# Patient Record
Sex: FEMALE | Race: WHITE | HISPANIC OR LATINO
[De-identification: names, ages, dates, MRNs, and addresses within clinical notes are randomized per-mention and may not be internally consistent; named-entity substitution may affect disease eponyms.]

---

## 2017-02-14 ENCOUNTER — MEDICATION RENEWAL (OUTPATIENT)
Age: 47
End: 2017-02-14

## 2017-03-06 ENCOUNTER — RX RENEWAL (OUTPATIENT)
Age: 47
End: 2017-03-06

## 2017-03-30 ENCOUNTER — APPOINTMENT (OUTPATIENT)
Dept: CARDIOLOGY | Facility: CLINIC | Age: 47
End: 2017-03-30

## 2017-03-30 ENCOUNTER — NON-APPOINTMENT (OUTPATIENT)
Age: 47
End: 2017-03-30

## 2017-03-30 VITALS — DIASTOLIC BLOOD PRESSURE: 70 MMHG | SYSTOLIC BLOOD PRESSURE: 120 MMHG

## 2017-03-30 VITALS
SYSTOLIC BLOOD PRESSURE: 151 MMHG | BODY MASS INDEX: 27.32 KG/M2 | HEIGHT: 66 IN | DIASTOLIC BLOOD PRESSURE: 82 MMHG | WEIGHT: 170 LBS

## 2017-05-04 ENCOUNTER — RX RENEWAL (OUTPATIENT)
Age: 47
End: 2017-05-04

## 2017-06-23 ENCOUNTER — INPATIENT (INPATIENT)
Facility: HOSPITAL | Age: 47
LOS: 0 days | Discharge: HOME | DRG: 761 | End: 2017-06-24
Attending: OBSTETRICS & GYNECOLOGY | Admitting: SPECIALIST
Payer: SELF-PAY

## 2017-06-23 ENCOUNTER — RESULT REVIEW (OUTPATIENT)
Age: 47
End: 2017-06-23

## 2017-06-23 VITALS
TEMPERATURE: 99 F | RESPIRATION RATE: 18 BRPM | HEART RATE: 120 BPM | SYSTOLIC BLOOD PRESSURE: 120 MMHG | OXYGEN SATURATION: 100 % | DIASTOLIC BLOOD PRESSURE: 79 MMHG

## 2017-06-23 DIAGNOSIS — N93.9 ABNORMAL UTERINE AND VAGINAL BLEEDING, UNSPECIFIED: ICD-10-CM

## 2017-06-23 DIAGNOSIS — D64.9 ANEMIA, UNSPECIFIED: ICD-10-CM

## 2017-06-23 LAB
APTT BLD: 22.2 SEC — LOW (ref 27.5–37.4)
BASOPHILS # BLD AUTO: 0 K/UL — SIGNIFICANT CHANGE UP (ref 0–0.2)
BASOPHILS NFR BLD AUTO: 0.3 % — SIGNIFICANT CHANGE UP (ref 0–2)
BLD GP AB SCN SERPL QL: NEGATIVE — SIGNIFICANT CHANGE UP
CK MB BLD-MCNC: 2.1 % — SIGNIFICANT CHANGE UP (ref 0–3.5)
CK MB CFR SERPL CALC: 1.3 NG/ML — SIGNIFICANT CHANGE UP (ref 0–3.8)
CK SERPL-CCNC: 63 U/L — SIGNIFICANT CHANGE UP (ref 25–170)
EOSINOPHIL # BLD AUTO: 0.1 K/UL — SIGNIFICANT CHANGE UP (ref 0–0.5)
EOSINOPHIL NFR BLD AUTO: 0.6 % — SIGNIFICANT CHANGE UP (ref 0–6)
GAS PNL BLDV: SIGNIFICANT CHANGE UP
HCG SERPL-ACNC: <2 MIU/ML — SIGNIFICANT CHANGE UP
HCT VFR BLD CALC: 17.7 % — CRITICAL LOW (ref 34.5–45)
HGB BLD-MCNC: 6.4 G/DL — CRITICAL LOW (ref 11.5–15.5)
INR BLD: 0.97 RATIO — SIGNIFICANT CHANGE UP (ref 0.88–1.16)
LYMPHOCYTES # BLD AUTO: 15.5 % — SIGNIFICANT CHANGE UP (ref 13–44)
LYMPHOCYTES # BLD AUTO: 2.1 K/UL — SIGNIFICANT CHANGE UP (ref 1–3.3)
MCHC RBC-ENTMCNC: 34.5 PG — HIGH (ref 27–34)
MCHC RBC-ENTMCNC: 36 GM/DL — SIGNIFICANT CHANGE UP (ref 32–36)
MCV RBC AUTO: 96 FL — SIGNIFICANT CHANGE UP (ref 80–100)
MONOCYTES # BLD AUTO: 0.8 K/UL — SIGNIFICANT CHANGE UP (ref 0–0.9)
MONOCYTES NFR BLD AUTO: 5.5 % — SIGNIFICANT CHANGE UP (ref 2–14)
NEUTROPHILS # BLD AUTO: 10.8 K/UL — HIGH (ref 1.8–7.4)
NEUTROPHILS NFR BLD AUTO: 78.1 % — HIGH (ref 43–77)
PLATELET # BLD AUTO: 264 K/UL — SIGNIFICANT CHANGE UP (ref 150–400)
PROTHROM AB SERPL-ACNC: 10.5 SEC — SIGNIFICANT CHANGE UP (ref 9.8–12.7)
RBC # BLD: 1.85 M/UL — LOW (ref 3.8–5.2)
RBC # FLD: 11.6 % — SIGNIFICANT CHANGE UP (ref 10.3–14.5)
RH IG SCN BLD-IMP: POSITIVE — SIGNIFICANT CHANGE UP
TROPONIN T SERPL-MCNC: <0.01 NG/ML — SIGNIFICANT CHANGE UP (ref 0–0.06)
WBC # BLD: 13.8 K/UL — HIGH (ref 3.8–10.5)
WBC # FLD AUTO: 13.8 K/UL — HIGH (ref 3.8–10.5)

## 2017-06-23 PROCEDURE — 99291 CRITICAL CARE FIRST HOUR: CPT | Mod: 25

## 2017-06-23 PROCEDURE — 88305 TISSUE EXAM BY PATHOLOGIST: CPT | Mod: 26

## 2017-06-23 PROCEDURE — 99222 1ST HOSP IP/OBS MODERATE 55: CPT

## 2017-06-23 PROCEDURE — 76830 TRANSVAGINAL US NON-OB: CPT | Mod: 26

## 2017-06-23 PROCEDURE — 93010 ELECTROCARDIOGRAM REPORT: CPT

## 2017-06-23 PROCEDURE — 71010: CPT | Mod: 26

## 2017-06-23 PROCEDURE — 58100 BIOPSY OF UTERUS LINING: CPT

## 2017-06-23 RX ORDER — INSULIN GLARGINE 100 [IU]/ML
60 INJECTION, SOLUTION SUBCUTANEOUS AT BEDTIME
Qty: 0 | Refills: 0 | Status: DISCONTINUED | OUTPATIENT
Start: 2017-06-23 | End: 2017-06-23

## 2017-06-23 RX ORDER — AMLODIPINE BESYLATE 2.5 MG/1
0 TABLET ORAL
Qty: 0 | Refills: 0 | COMMUNITY

## 2017-06-23 RX ORDER — SODIUM CHLORIDE 9 MG/ML
500 INJECTION INTRAMUSCULAR; INTRAVENOUS; SUBCUTANEOUS ONCE
Qty: 0 | Refills: 0 | Status: COMPLETED | OUTPATIENT
Start: 2017-06-23 | End: 2017-06-23

## 2017-06-23 RX ORDER — GLUCAGON INJECTION, SOLUTION 0.5 MG/.1ML
1 INJECTION, SOLUTION SUBCUTANEOUS ONCE
Qty: 0 | Refills: 0 | Status: DISCONTINUED | OUTPATIENT
Start: 2017-06-23 | End: 2017-06-24

## 2017-06-23 RX ORDER — DEXTROSE 50 % IN WATER 50 %
25 SYRINGE (ML) INTRAVENOUS ONCE
Qty: 0 | Refills: 0 | Status: DISCONTINUED | OUTPATIENT
Start: 2017-06-23 | End: 2017-06-24

## 2017-06-23 RX ORDER — FUROSEMIDE 40 MG
40 TABLET ORAL DAILY
Qty: 0 | Refills: 0 | Status: DISCONTINUED | OUTPATIENT
Start: 2017-06-23 | End: 2017-06-23

## 2017-06-23 RX ORDER — LOSARTAN POTASSIUM 100 MG/1
0 TABLET, FILM COATED ORAL
Qty: 0 | Refills: 0 | COMMUNITY

## 2017-06-23 RX ORDER — INSULIN LISPRO 100/ML
VIAL (ML) SUBCUTANEOUS
Qty: 0 | Refills: 0 | Status: DISCONTINUED | OUTPATIENT
Start: 2017-06-23 | End: 2017-06-24

## 2017-06-23 RX ORDER — CLOPIDOGREL BISULFATE 75 MG/1
75 TABLET, FILM COATED ORAL DAILY
Qty: 0 | Refills: 0 | Status: DISCONTINUED | OUTPATIENT
Start: 2017-06-23 | End: 2017-06-24

## 2017-06-23 RX ORDER — SODIUM CHLORIDE 9 MG/ML
1000 INJECTION, SOLUTION INTRAVENOUS
Qty: 0 | Refills: 0 | Status: DISCONTINUED | OUTPATIENT
Start: 2017-06-23 | End: 2017-06-24

## 2017-06-23 RX ORDER — INSULIN LISPRO 100/ML
6 VIAL (ML) SUBCUTANEOUS ONCE
Qty: 0 | Refills: 0 | Status: COMPLETED | OUTPATIENT
Start: 2017-06-23 | End: 2017-06-23

## 2017-06-23 RX ORDER — LEVOTHYROXINE SODIUM 125 MCG
0 TABLET ORAL
Qty: 0 | Refills: 0 | COMMUNITY

## 2017-06-23 RX ORDER — DEXTROSE 50 % IN WATER 50 %
12.5 SYRINGE (ML) INTRAVENOUS ONCE
Qty: 0 | Refills: 0 | Status: DISCONTINUED | OUTPATIENT
Start: 2017-06-23 | End: 2017-06-24

## 2017-06-23 RX ORDER — CLOPIDOGREL BISULFATE 75 MG/1
0 TABLET, FILM COATED ORAL
Qty: 0 | Refills: 0 | COMMUNITY

## 2017-06-23 RX ORDER — LOSARTAN POTASSIUM 100 MG/1
50 TABLET, FILM COATED ORAL DAILY
Qty: 0 | Refills: 0 | Status: DISCONTINUED | OUTPATIENT
Start: 2017-06-23 | End: 2017-06-24

## 2017-06-23 RX ORDER — INSULIN LISPRO 100/ML
VIAL (ML) SUBCUTANEOUS AT BEDTIME
Qty: 0 | Refills: 0 | Status: DISCONTINUED | OUTPATIENT
Start: 2017-06-23 | End: 2017-06-24

## 2017-06-23 RX ORDER — INSULIN LISPRO 100/ML
10 VIAL (ML) SUBCUTANEOUS
Qty: 0 | Refills: 0 | Status: DISCONTINUED | OUTPATIENT
Start: 2017-06-23 | End: 2017-06-24

## 2017-06-23 RX ORDER — IBUPROFEN 200 MG
400 TABLET ORAL EVERY 6 HOURS
Qty: 0 | Refills: 0 | Status: DISCONTINUED | OUTPATIENT
Start: 2017-06-23 | End: 2017-06-24

## 2017-06-23 RX ORDER — DEXTROSE 50 % IN WATER 50 %
1 SYRINGE (ML) INTRAVENOUS ONCE
Qty: 0 | Refills: 0 | Status: DISCONTINUED | OUTPATIENT
Start: 2017-06-23 | End: 2017-06-24

## 2017-06-23 RX ORDER — METOPROLOL TARTRATE 50 MG
25 TABLET ORAL EVERY 12 HOURS
Qty: 0 | Refills: 0 | Status: DISCONTINUED | OUTPATIENT
Start: 2017-06-23 | End: 2017-06-24

## 2017-06-23 RX ORDER — INSULIN GLARGINE 100 [IU]/ML
32 INJECTION, SOLUTION SUBCUTANEOUS AT BEDTIME
Qty: 0 | Refills: 0 | Status: DISCONTINUED | OUTPATIENT
Start: 2017-06-23 | End: 2017-06-24

## 2017-06-23 RX ORDER — NORGESTIMATE AND ETHINYL ESTRADIOL 7DAYSX3 LO
0 KIT ORAL
Qty: 0 | Refills: 0 | COMMUNITY

## 2017-06-23 RX ORDER — ACETAMINOPHEN 500 MG
650 TABLET ORAL EVERY 6 HOURS
Qty: 0 | Refills: 0 | Status: DISCONTINUED | OUTPATIENT
Start: 2017-06-23 | End: 2017-06-24

## 2017-06-23 RX ORDER — METOPROLOL TARTRATE 50 MG
0 TABLET ORAL
Qty: 0 | Refills: 0 | COMMUNITY

## 2017-06-23 RX ORDER — LEVOTHYROXINE SODIUM 125 MCG
137 TABLET ORAL DAILY
Qty: 0 | Refills: 0 | Status: DISCONTINUED | OUTPATIENT
Start: 2017-06-23 | End: 2017-06-24

## 2017-06-23 RX ADMIN — Medication 25 MILLIGRAM(S): at 21:50

## 2017-06-23 RX ADMIN — SODIUM CHLORIDE 125 MILLILITER(S): 9 INJECTION, SOLUTION INTRAVENOUS at 18:27

## 2017-06-23 RX ADMIN — INSULIN GLARGINE 32 UNIT(S): 100 INJECTION, SOLUTION SUBCUTANEOUS at 22:41

## 2017-06-23 RX ADMIN — SODIUM CHLORIDE 500 MILLILITER(S): 9 INJECTION INTRAMUSCULAR; INTRAVENOUS; SUBCUTANEOUS at 12:16

## 2017-06-23 NOTE — ED ADULT NURSE NOTE - OBJECTIVE STATEMENT
47 year old female presented to ED with c/o of vaginal bleeding for the past 3 days. Blood has been copious amounts and bright red with clots. Pt has cardiac hx of stents and diabetes. Pt states she gets her period every 3 months and is on birth control. Pt denies CP, SOB, nausea/vomiting, numbness/tingling, fever, cough, chills. Pt a&ox3, heart rate tachy, rhythm regular, abdomen soft tender to touch, nondistended. +BS in all four quadrants. Pt denies burning/pain on urination. Skin intact. IV in right AC 20G and patent. Pt currently resting in bed with side rails up for safety.

## 2017-06-23 NOTE — ED PROVIDER NOTE - MEDICAL DECISION MAKING DETAILS
Attending MD Clark: 47F with CAD s/p PCI, CHF, DM presenting with heavy vaginal bleeding x 3 days, patient arrives tachycardic into 130s, pale, endorsing dizziness and GIRALDO, concern for symptomatic anemia in setting of heavy vaginal bleeding, will initiate transfusion now, pelvic exam and TVUS when more stable

## 2017-06-23 NOTE — CONSULT NOTE ADULT - ASSESSMENT
47y  LMP  with PMH of CAD s/p stent placement on Plavix, HTN, DM2, hypothryoid, who presents with heavy vaginal bleeding and symptomatic anemia.

## 2017-06-23 NOTE — H&P ADULT - PROBLEM SELECTOR PLAN 1
- pad counts  - 2u PRBC  - f/u TVUS  - Progesterone if patient's bleeding does not slow down  - will need biopsy as either inpatient or outpatient.  - Monitor VS  - repeat CBC 4 hrs post transfusion

## 2017-06-23 NOTE — ED ADULT NURSE NOTE - CHPI ED SYMPTOMS NEG
no decreased eating/drinking/no loss of consciousness/no vomiting/no chills/no nausea/no fever/no back pain/no headache/no dizziness

## 2017-06-23 NOTE — CONSULT NOTE ADULT - SUBJECTIVE AND OBJECTIVE BOX
HPI    47y  LMP  with PMH of CAD s/p stent placement on Plavix, HTN, DM2, hypothryoid, who presents with heavy vaginal bleeding and symptomatic anemia. Patient states this has never happened to her before. She is on Sprintec for hormonal control (prescribed by her endocrinologist) and has a period every 3 months. She states this time, her period came normally but her bleeding was excessive. She states she changes a pad every 30 minutes. She admits to having shortness of breath and palpitations with ambulation. She admits to feeling lightheaded.      OB/GYN HISTORY:  x 3, denies abnormal paps/fibroids  PAST MEDICAL & SURGICAL HISTORY:  CAD (Coronary Artery Disease)  Hypercholesteremia  History of Hypothyroidism  Essential Hypertension  GERD (Gastroesophageal Reflux Disease)  Diabetes Mellitus Type II, Controlled  S/P coronary artery stent placement  and     Allergies  No Known Allergies    MEDICATIONS  (STANDING):  Plavix  Synthroid  Metoprolol  Losartan  Insulin  Sprintec    REVIEW OF SYSTEMS:    CONSTITUTIONAL: Weakness, admits to feeling cold  EYES/ENT: No visual changes;  No vertigo or throat pain   NECK: No pain or stiffness  RESPIRATORY: SOB with exertion   CARDIOVASCULAR: palpitations  GASTROINTESTINAL: No abdominal or epigastric pain. No nausea, vomiting, or hematemesis; No diarrhea or constipation. No melena or hematochezia.  GENITOURINARY: vaginal bleeding  NEUROLOGICAL: No numbness or weakness  SKIN: No itching, burning, rashes, or lesions   All other review of systems is negative unless indicated above.      Vital Signs Last 24 Hrs  T(C): 36.7, Max: 37.1 (-23 @ 10:23)  T(F): 98, Max: 98.7 (-23 @ 10:23)  HR: 125 (120 - 125)  BP: 134/82 (120/79 - 134/82)  BP(mean): --  RR: 18 (18 - 18)  SpO2: 99% (99% - 100%)    PHYSICAL EXAM:      Constitutional: alert and oriented x 3    Breasts: no tenderness, no nodules    Respiratory: clear    Cardiovascular: regular rate and rhythm    Gastrointestinal: soft, non tender, + bowel sounds. No hepatosplenomegaly, no palpable masses    Genitourinary: NEFG  Cervix: closed/ long, no CMT  Uterus: normal size, non tender  Adnexa: non tender, no palpable masses    Rectal:     Extremities:    Neurological:    Skin:    Lymph Nodes:        LABS:                        6.4    13.8  )-----------( 264      ( 2017 11:12 )             17.7         134<L>  |  99  |  11  ----------------------------<  376<H>  4.4   |  23  |  0.71    Ca    8.9      2017 11:12    TPro  5.9<L>  /  Alb  3.4  /  TBili  0.2  /  DBili  x   /  AST  14  /  ALT  13  /  AlkPhos  74      PT/INR - ( 2017 11:12 )   PT: 10.5 sec;   INR: 0.97 ratio         PTT - ( 2017 11:12 )  PTT:22.2 sec      Blood Type: B Positive      RADIOLOGY & ADDITIONAL STUDIES: HPI    47y  LMP  with PMH of CAD s/p stent placement on Plavix, HTN, DM2, hypothryoid, who presents with heavy vaginal bleeding and symptomatic anemia. Patient states this has never happened to her before. She is on Sprintec for hormonal control (prescribed by her endocrinologist) and has a period every 3 months. She states this time, her period came normally but her bleeding was excessive. She states she changes a pad every 30 minutes. She admits to having shortness of breath and palpitations with ambulation. She admits to feeling lightheaded.      OB/GYN HISTORY:  x 3, denies abnormal paps/fibroids  PAST MEDICAL & SURGICAL HISTORY:  CAD (Coronary Artery Disease)  Hypercholesteremia  History of Hypothyroidism  Essential Hypertension  GERD (Gastroesophageal Reflux Disease)  Diabetes Mellitus Type II, Controlled  S/P coronary artery stent placement  and     Allergies  No Known Allergies    MEDICATIONS  (STANDING):  Plavix  Synthroid  Metoprolol  Losartan  Insulin  Sprintec    REVIEW OF SYSTEMS:    CONSTITUTIONAL: Weakness, admits to feeling cold  EYES/ENT: No visual changes;  No vertigo or throat pain   NECK: No pain or stiffness  RESPIRATORY: SOB with exertion   CARDIOVASCULAR: palpitations  GASTROINTESTINAL: No abdominal or epigastric pain. No nausea, vomiting, or hematemesis; No diarrhea or constipation. No melena or hematochezia.  GENITOURINARY: vaginal bleeding  NEUROLOGICAL: No numbness or weakness  SKIN: No itching, burning, rashes, or lesions   All other review of systems is negative unless indicated above.      Vital Signs Last 24 Hrs  T(C): 36.7, Max: 37.1 (-23 @ 10:23)  T(F): 98, Max: 98.7 (-23 @ 10:23)  HR: 125 (120 - 125)  BP: 134/82 (120/79 - 134/82)  BP(mean): --  RR: 18 (18 - 18)  SpO2: 99% (99% - 100%)    PHYSICAL EXAM:    Constitutional: alert and oriented x 3  Respiratory: clear  Cardiovascular: regular rate and rhythm  Gastrointestinal: soft, non tender, + bowel sounds. No hepatosplenomegaly, no palpable masses  Genitourinary: NEFG  Cervix: dilated 1cm, minimal blood oozing from cervical os, approximately 10-20 cc of blood in vaginal vault  Uterus: normal size, anteverted  Adnexa: non tender, no palpable masses      LABS:                        6.4    13.8  )-----------( 264      ( 2017 11:12 )             17.7         134<L>  |  99  |  11  ----------------------------<  376<H>  4.4   |  23  |  0.71    Ca    8.9      2017 11:12    TPro  5.9<L>  /  Alb  3.4  /  TBili  0.2  /  DBili  x   /  AST  14  /  ALT  13  /  AlkPhos  74      PT/INR - ( 2017 11:12 )   PT: 10.5 sec;   INR: 0.97 ratio         PTT - ( 2017 11:12 )  PTT:22.2 sec      Blood Type: B Positive

## 2017-06-23 NOTE — ED ADULT NURSE NOTE - PMH
CAD (Coronary Artery Disease)    Diabetes Mellitus Type II, Controlled    Essential Hypertension    History of Hypothyroidism    Hypercholesteremia

## 2017-06-23 NOTE — CONSULT NOTE ADULT - PROBLEM SELECTOR RECOMMENDATION 9
- Monitor bleeding  - pad counts  - 2u PRBC  - f/u TVUS  - Progesterone if patient's bleeding does not slow down  - will need biopsy as either inpatient or outpatient

## 2017-06-23 NOTE — H&P ADULT - NSHPREVIEWOFSYSTEMS_GEN_ALL_CORE
REVIEW OF SYSTEMS:    CONSTITUTIONAL: Weakness, admits to feeling cold  EYES/ENT: No visual changes;  No vertigo or throat pain   NECK: No pain or stiffness  RESPIRATORY: SOB with exertion   CARDIOVASCULAR: palpitations  GASTROINTESTINAL: No abdominal or epigastric pain. No nausea, vomiting, or hematemesis; No diarrhea or constipation. No melena or hematochezia.  GENITOURINARY: vaginal bleeding  NEUROLOGICAL: No numbness or weakness  SKIN: No itching, burning, rashes, or lesions   All other review of systems is negative unless indicated above.

## 2017-06-23 NOTE — ED PROVIDER NOTE - OBJECTIVE STATEMENT
47 year old female with PMHx of DM, HTN, HLD, hypothyroidism, and CAD s/p stent and heavy periods "always" presents with 2 days of heavy vaginal bleeding with clots now accompanied by dyspnea on exertion. No chest pain, nausea, or fever. She is already on OCPs from her endocrinologist and does not have an OBGYN.

## 2017-06-23 NOTE — H&P ADULT - HISTORY OF PRESENT ILLNESS
HPI    47y  LMP  with PMH of CAD s/p stent placement on Plavix, HTN, DM2, hypothryoid, who presents with heavy vaginal bleeding and symptomatic anemia. Patient states this has never happened to her before. She is on Sprintec for hormonal control (prescribed by her endocrinologist) and has a period every 3 months. She states this time, her period came normally but her bleeding was excessive. She states she changes a pad every 30 minutes. She admits to having shortness of breath and palpitations with ambulation. She admits to feeling lightheaded.      OB/GYN HISTORY:  x 3, denies abnormal paps/fibroids  PAST MEDICAL & SURGICAL HISTORY:  CAD (Coronary Artery Disease)  Hypercholesteremia  History of Hypothyroidism  Essential Hypertension  GERD (Gastroesophageal Reflux Disease)  Diabetes Mellitus Type II, Controlled  S/P coronary artery stent placement  and

## 2017-06-23 NOTE — ED ADULT NURSE REASSESSMENT NOTE - NS ED NURSE REASSESS COMMENT FT1
Blood bank contacted. Type and screen received at 1130. Blood will be ready in 45 minutes. Will continue to monitor.

## 2017-06-23 NOTE — H&P ADULT - NSHPLABSRESULTS_GEN_ALL_CORE
LABS:                        6.4    13.8  )-----------( 264      ( 23 Jun 2017 11:12 )             17.7     06-23    134<L>  |  99  |  11  ----------------------------<  376<H>  4.4   |  23  |  0.71    Ca    8.9      23 Jun 2017 11:12    TPro  5.9<L>  /  Alb  3.4  /  TBili  0.2  /  DBili  x   /  AST  14  /  ALT  13  /  AlkPhos  74  06-23    PT/INR - ( 23 Jun 2017 11:12 )   PT: 10.5 sec;   INR: 0.97 ratio         PTT - ( 23 Jun 2017 11:12 )  PTT:22.2 sec      Blood Type: B Positive

## 2017-06-23 NOTE — PROCEDURE NOTE - SUPERVISORY STATEMENT
GYN Attending Note    I was present and participated in every aspect of endometrial bx. Procedure explained to patient and informed consent signed and witnessed.     ROSIO Villaseñor

## 2017-06-23 NOTE — PATIENT PROFILE ADULT. - VISION (WITH CORRECTIVE LENSES IF THE PATIENT USUALLY WEARS THEM):
Partially impaired: cannot see medication labels or newsprint, but can see obstacles in path, and the surrounding layout; can count fingers at arm's length/B/L Eyes Cataract surgery

## 2017-06-23 NOTE — ED PROVIDER NOTE - CARE PLAN
Principal Discharge DX:	Vaginal bleeding Principal Discharge DX:	Vaginal bleeding  Secondary Diagnosis:	Symptomatic anemia

## 2017-06-23 NOTE — PROCEDURE NOTE - GENERAL PROCEDURE DETAILS
cervix grasped with allis clamp, two passes of pipelle into uterus, entered easily, adequate tissue obtained

## 2017-06-23 NOTE — ED PROVIDER NOTE - PHYSICAL EXAMINATION
Attending MD Clark: A & O x 3, NAD, pale, HEENT WNL and no facial asymmetry; lungs CTAB, heart tachy but reg rhythm without murmur; abdomen soft NTND; extremities with no edema; neuro exam non focal with no motor or sensory deficits. Attending MD Clark: A & O x 3, NAD, pale, HEENT WNL and no facial asymmetry; lungs CTAB, heart tachy but reg rhythm without murmur; abdomen soft NTND; extremities with no edema; neuro exam non focal with no motor or sensory deficits. pelvic exam (chaperone Dr. Vu): clot at cervical os, small amount of blood in posterior fornix

## 2017-06-23 NOTE — H&P ADULT - NSHPPHYSICALEXAM_GEN_ALL_CORE
Vital Signs Last 24 Hrs  T(C): 36.7, Max: 37.1 (06-23 @ 10:23)  T(F): 98, Max: 98.7 (06-23 @ 10:23)  HR: 125 (120 - 125)  BP: 134/82 (120/79 - 134/82)  BP(mean): --  RR: 18 (18 - 18)  SpO2: 99% (99% - 100%)    PHYSICAL EXAM:    Constitutional: alert and oriented x 3  Respiratory: clear  Cardiovascular: regular rate and rhythm  Gastrointestinal: soft, non tender, + bowel sounds. No hepatosplenomegaly, no palpable masses  Genitourinary: NEFG  Cervix: dilated 1cm, minimal blood oozing from cervical os, approximately 10-20 cc of blood in vaginal vault  Uterus: normal size, anteverted  Adnexa: non tender, no palpable masses

## 2017-06-23 NOTE — ED ADULT NURSE REASSESSMENT NOTE - NS ED NURSE REASSESS COMMENT FT1
Second unit of packed red blood cells administered at 1800 as per MD order with 2 RN's present. VSS.

## 2017-06-23 NOTE — H&P ADULT - ATTENDING COMMENTS
Pt seen in ER w GYN team.  46yo admitted w significant symptomatic anemia.  Pt has not seen GYN in approx 5 yrs.  She has been treated w continuous OCPS by her endocrinologist for ?early menopause and wgt gain.  Long discussion w pt.  Explained that she should not continue OCPS.  Will transfuse and monitor Hcts.  Menorrhagia can be managed w progesterone pending results of sono.  Plan d/w pt.  All questions answered.

## 2017-06-23 NOTE — ED ADULT NURSE REASSESSMENT NOTE - NS ED NURSE REASSESS COMMENT FT1
Consent for blood in chart. Blood started at 1250 as per MD order with 2 RN's present. VS taken before infusion and 15 minutes after. Blood was infusing over 3 hours, order changed to infuse over an hour. VSS.

## 2017-06-23 NOTE — ED PROVIDER NOTE - ATTENDING CONTRIBUTION TO CARE
Attending MD Clark:  I personally have seen and examined this patient.  Resident note reviewed and agree on plan of care and except where noted.  See HPI, PE, and MDM for details.

## 2017-06-24 VITALS
OXYGEN SATURATION: 100 % | RESPIRATION RATE: 16 BRPM | TEMPERATURE: 98 F | DIASTOLIC BLOOD PRESSURE: 70 MMHG | SYSTOLIC BLOOD PRESSURE: 112 MMHG | HEART RATE: 85 BPM

## 2017-06-24 LAB
BASOPHILS # BLD AUTO: 0 K/UL — SIGNIFICANT CHANGE UP (ref 0–0.2)
BASOPHILS NFR BLD AUTO: 0.2 % — SIGNIFICANT CHANGE UP (ref 0–2)
EOSINOPHIL # BLD AUTO: 0.1 K/UL — SIGNIFICANT CHANGE UP (ref 0–0.5)
EOSINOPHIL NFR BLD AUTO: 0.8 % — SIGNIFICANT CHANGE UP (ref 0–6)
HBA1C BLD-MCNC: 7.2 % — HIGH (ref 4–5.6)
HCT VFR BLD CALC: 25.2 % — LOW (ref 34.5–45)
HGB BLD-MCNC: 8.6 G/DL — LOW (ref 11.5–15.5)
LYMPHOCYTES # BLD AUTO: 19 % — SIGNIFICANT CHANGE UP (ref 13–44)
LYMPHOCYTES # BLD AUTO: 2.4 K/UL — SIGNIFICANT CHANGE UP (ref 1–3.3)
MCHC RBC-ENTMCNC: 31.3 PG — SIGNIFICANT CHANGE UP (ref 27–34)
MCHC RBC-ENTMCNC: 34.1 GM/DL — SIGNIFICANT CHANGE UP (ref 32–36)
MCV RBC AUTO: 91.8 FL — SIGNIFICANT CHANGE UP (ref 80–100)
MONOCYTES # BLD AUTO: 0.8 K/UL — SIGNIFICANT CHANGE UP (ref 0–0.9)
MONOCYTES NFR BLD AUTO: 6.5 % — SIGNIFICANT CHANGE UP (ref 2–14)
NEUTROPHILS # BLD AUTO: 9.4 K/UL — HIGH (ref 1.8–7.4)
NEUTROPHILS NFR BLD AUTO: 73.5 % — SIGNIFICANT CHANGE UP (ref 43–77)
PLATELET # BLD AUTO: 254 K/UL — SIGNIFICANT CHANGE UP (ref 150–400)
RAPID RVP RESULT: DETECTED
RBC # BLD: 2.75 M/UL — LOW (ref 3.8–5.2)
RBC # FLD: 12.9 % — SIGNIFICANT CHANGE UP (ref 10.3–14.5)
RV+EV RNA SPEC QL NAA+PROBE: DETECTED
WBC # BLD: 12.8 K/UL — HIGH (ref 3.8–10.5)
WBC # FLD AUTO: 12.8 K/UL — HIGH (ref 3.8–10.5)

## 2017-06-24 PROCEDURE — 84702 CHORIONIC GONADOTROPIN TEST: CPT

## 2017-06-24 PROCEDURE — 84295 ASSAY OF SERUM SODIUM: CPT

## 2017-06-24 PROCEDURE — 76830 TRANSVAGINAL US NON-OB: CPT

## 2017-06-24 PROCEDURE — 71045 X-RAY EXAM CHEST 1 VIEW: CPT

## 2017-06-24 PROCEDURE — 88305 TISSUE EXAM BY PATHOLOGIST: CPT

## 2017-06-24 PROCEDURE — 82550 ASSAY OF CK (CPK): CPT

## 2017-06-24 PROCEDURE — 83036 HEMOGLOBIN GLYCOSYLATED A1C: CPT

## 2017-06-24 PROCEDURE — 86923 COMPATIBILITY TEST ELECTRIC: CPT

## 2017-06-24 PROCEDURE — 87798 DETECT AGENT NOS DNA AMP: CPT

## 2017-06-24 PROCEDURE — 87486 CHLMYD PNEUM DNA AMP PROBE: CPT

## 2017-06-24 PROCEDURE — 36430 TRANSFUSION BLD/BLD COMPNT: CPT

## 2017-06-24 PROCEDURE — 85014 HEMATOCRIT: CPT

## 2017-06-24 PROCEDURE — 82803 BLOOD GASES ANY COMBINATION: CPT

## 2017-06-24 PROCEDURE — 87581 M.PNEUMON DNA AMP PROBE: CPT

## 2017-06-24 PROCEDURE — 84484 ASSAY OF TROPONIN QUANT: CPT

## 2017-06-24 PROCEDURE — 85027 COMPLETE CBC AUTOMATED: CPT

## 2017-06-24 PROCEDURE — 86900 BLOOD TYPING SEROLOGIC ABO: CPT

## 2017-06-24 PROCEDURE — 82435 ASSAY OF BLOOD CHLORIDE: CPT

## 2017-06-24 PROCEDURE — 85610 PROTHROMBIN TIME: CPT

## 2017-06-24 PROCEDURE — P9016: CPT

## 2017-06-24 PROCEDURE — 83605 ASSAY OF LACTIC ACID: CPT

## 2017-06-24 PROCEDURE — 87633 RESP VIRUS 12-25 TARGETS: CPT

## 2017-06-24 PROCEDURE — 86901 BLOOD TYPING SEROLOGIC RH(D): CPT

## 2017-06-24 PROCEDURE — 80053 COMPREHEN METABOLIC PANEL: CPT

## 2017-06-24 PROCEDURE — 99291 CRITICAL CARE FIRST HOUR: CPT | Mod: 25

## 2017-06-24 PROCEDURE — 82947 ASSAY GLUCOSE BLOOD QUANT: CPT

## 2017-06-24 PROCEDURE — 93005 ELECTROCARDIOGRAM TRACING: CPT

## 2017-06-24 PROCEDURE — 86850 RBC ANTIBODY SCREEN: CPT

## 2017-06-24 PROCEDURE — 85730 THROMBOPLASTIN TIME PARTIAL: CPT

## 2017-06-24 PROCEDURE — 84132 ASSAY OF SERUM POTASSIUM: CPT

## 2017-06-24 PROCEDURE — 82330 ASSAY OF CALCIUM: CPT

## 2017-06-24 PROCEDURE — 82553 CREATINE MB FRACTION: CPT

## 2017-06-24 RX ORDER — LEVOTHYROXINE SODIUM 125 MCG
137 TABLET ORAL
Qty: 0 | Refills: 0 | COMMUNITY

## 2017-06-24 RX ORDER — NORGESTIMATE AND ETHINYL ESTRADIOL 7DAYSX3 LO
1 KIT ORAL
Qty: 0 | Refills: 0 | COMMUNITY

## 2017-06-24 RX ORDER — NORETHINDRONE 0.35 MG/1
1 TABLET ORAL
Qty: 28 | Refills: 0 | OUTPATIENT
Start: 2017-06-24 | End: 2017-07-22

## 2017-06-24 RX ADMIN — Medication 25 MILLIGRAM(S): at 06:09

## 2017-06-24 RX ADMIN — Medication 10 UNIT(S): at 08:47

## 2017-06-24 RX ADMIN — LOSARTAN POTASSIUM 50 MILLIGRAM(S): 100 TABLET, FILM COATED ORAL at 06:09

## 2017-06-24 RX ADMIN — Medication 137 MICROGRAM(S): at 06:09

## 2017-06-24 RX ADMIN — Medication 2: at 08:47

## 2017-06-24 NOTE — DISCHARGE NOTE ADULT - CARE PLAN
Principal Discharge DX:	Symptomatic anemia  Goal:	GYN management of abnormal uterine bleeding  Instructions for follow-up, activity and diet:	Please call [phone] for a GYN appointment 1-2 weeks from discharge. Take iron supplements with vitamin c and colace three times a day for anemia. Seek medical attention if you have uncontrolled vaginal bleeding, chest pain, difficulty breathing, or weakness Principal Discharge DX:	Symptomatic anemia  Goal:	GYN management of abnormal uterine bleeding  Instructions for follow-up, activity and diet:	Please call [phone] for a GYN appointment 1-2 weeks from discharge. Take micronor daily. Take iron supplements with vitamin c and colace three times a day for anemia. Seek medical attention if you have uncontrolled vaginal bleeding, chest pain, difficulty breathing, or weakness Principal Discharge DX:	Symptomatic anemia  Goal:	GYN management of abnormal uterine bleeding  Instructions for follow-up, activity and diet:	Please call [phone] for a GYN appointment 1-2 weeks from discharge. Take micronor daily. Take iron supplements with vitamin c and colace three times a day for anemia. Seek medical attention if you have uncontrolled vaginal bleeding, chest pain, difficulty breathing, or weakness

## 2017-06-24 NOTE — DISCHARGE NOTE ADULT - CARE PROVIDER_API CALL
GYN CLINIC,   [address] [place] [address]  [address] [place] [address]  Phone: [phone]  Fax: (   )    -

## 2017-06-24 NOTE — DISCHARGE NOTE ADULT - PATIENT PORTAL LINK FT
“You can access the Follow[employer] Patient Portal, offered by [facility], by registering with the following website: [email or web address]”

## 2017-06-24 NOTE — DISCHARGE NOTE ADULT - HOSPITAL COURSE
Pt admitted with symptomatic anemia 2/2 abnormal uterine bleeding. An endometrial biopsy was performed in the emergency room. She was tranfused 2U PRBC overnight. Hct 17.7->2UPRBC->25.2. Endocrine was consulted for management of T1DM, they recommended Lantus 32qhs and Humalog 10 premeal. Fingersticks were well controlled in the hospital. She did not have any further bleeding while in the hospital. She was discharged home in stable condition to start ortho micronor and follow up at [address] [place] [address] in 1-2 weeks.

## 2017-06-24 NOTE — DISCHARGE NOTE ADULT - PROVIDER TOKENS
FREE:[LAST:[GYN CLINIC],PHONE:[phone]:[phone]   [phone]    -],ADDRESS:[[address] [place] [address]]]

## 2017-06-24 NOTE — PROVIDER CONTACT NOTE (OTHER) - ASSESSMENT
Pt is resting in bed Pt denies pain . Pt stated she has only one vein at the right AC which is where she is getting the IV Fluid. She refused to let us try somewhere else, she said she will sign AMA . Dr Zimmerman notified

## 2017-06-24 NOTE — DISCHARGE NOTE ADULT - MEDICATION SUMMARY - MEDICATIONS TO TAKE
I will START or STAY ON the medications listed below when I get home from the hospital:    aspirin 81 mg oral tablet  -- 1 tab(s) by mouth once a day  -- Indication: For home med    losartan  -- 50 milligram(s) by mouth once a day  -- Indication: For home med    Toujeo SoloStar 300 units/mL subcutaneous solution  -- 60 unit(s) subcutaneous once a day (at bedtime)  -- Indication: For home med    NovoLOG 100 units/mL subcutaneous solution  -- 10 unit(s) subcutaneous 3 times a day (with meals), As Needed  -- Indication: For home med    atorvastatin 40 mg oral tablet  -- 1 tab(s) by mouth once a day  -- Indication: For home med    Plavix  -- 75 milligram(s) by mouth once a day  -- Indication: For home med    Lopressor  -- 25 milligram(s) by mouth every 12 hours  -- Indication: For home med    Norvasc  -- 5 milligram(s) by mouth once a day  -- Indication: For home med    furosemide 40 mg oral tablet  -- 1 tab(s) by mouth once a day  -- Indication: For home med I will START or STAY ON the medications listed below when I get home from the hospital:    aspirin 81 mg oral tablet  -- 1 tab(s) by mouth once a day  -- Indication: For home med    losartan  -- 50 milligram(s) by mouth once a day  -- Indication: For home med    Toujeo SoloStar 300 units/mL subcutaneous solution  -- 60 unit(s) subcutaneous once a day (at bedtime)  -- Indication: For home med    NovoLOG 100 units/mL subcutaneous solution  -- 10 unit(s) subcutaneous 3 times a day (with meals), As Needed  -- Indication: For home med    atorvastatin 40 mg oral tablet  -- 1 tab(s) by mouth once a day  -- Indication: For home med    Plavix  -- 75 milligram(s) by mouth once a day  -- Indication: For home med    Lopressor  -- 25 milligram(s) by mouth every 12 hours  -- Indication: For home med    Norvasc  -- 5 milligram(s) by mouth once a day  -- Indication: For home med    furosemide 40 mg oral tablet  -- 1 tab(s) by mouth once a day  -- Indication: For home med    Ortho Micronor 0.35 mg oral tablet  -- 1 tab(s) by mouth once a day  -- Do not take this drug if you are pregnant.  It is very important that you take or use this exactly as directed.  Do not skip doses or discontinue unless directed by your doctor.    -- Indication: For Abnormal uterine and vaginal bleeding

## 2017-06-24 NOTE — DISCHARGE NOTE ADULT - VISION (WITH CORRECTIVE LENSES IF THE PATIENT USUALLY WEARS THEM):
B/L Eyes Cataract surgery/Partially impaired: cannot see medication labels or newsprint, but can see obstacles in path, and the surrounding layout; can count fingers at arm's length

## 2017-06-24 NOTE — PROGRESS NOTE ADULT - SUBJECTIVE AND OBJECTIVE BOX
Gyn Progress Note HD # 2    Subjective:     Pt seen and examined at bedside. She was transfused 2U PRBC overnight, which she tolerated well. She was refusing blood draw at the 4hr post transfusion CBC, but accepted a blood draw at 645AM. Denies any further vaginal bleeding. Denies any pain. Patient ambulating. Passing flatus. Tolerating regular diet. Pt denies fever, chills, chest pain, SOB, nausea, vomiting, lightheadedness, dizziness.      Objective:  T(F): 98.5, Max: 98.9 (06-23 @ 21:30)  HR: 77 (77 - 125)  BP: 135/81 (120/79 - 143/84)  RR: 17 (17 - 18)  SpO2: 98% (98% - 100%)  I&O's Summary    I & Os for current day (as of 24 Jun 2017 07:12)  =============================================  IN: 120 ml / OUT: 1 ml / NET: 119 ml    CAPILLARY BLOOD GLUCOSE  138 (23 Jun 2017 21:00)      MEDICATIONS  (STANDING):  lactated ringers. 1000milliLiter(s) IV Continuous <Continuous>  multivitamin 1Tablet(s) Oral daily  losartan 50milliGRAM(s) Oral daily  metoprolol 25milliGRAM(s) Oral every 12 hours  levothyroxine 137MICROGram(s) Oral daily  insulin lispro (HumaLOG) corrective regimen sliding scale  SubCutaneous three times a day before meals  dextrose 5%. 1000milliLiter(s) IV Continuous <Continuous>  dextrose 50% Injectable 12.5Gram(s) IV Push once  dextrose 50% Injectable 25Gram(s) IV Push once  dextrose 50% Injectable 25Gram(s) IV Push once  clopidogrel Tablet 75milliGRAM(s) Oral daily  insulin glargine Injectable (LANTUS) 32Unit(s) SubCutaneous at bedtime  insulin lispro Injectable (HumaLOG) 10Unit(s) SubCutaneous three times a day before meals  insulin lispro (HumaLOG) corrective regimen sliding scale  SubCutaneous at bedtime    MEDICATIONS  (PRN):  acetaminophen   Tablet. 650milliGRAM(s) Oral every 6 hours PRN Mild Pain (1 - 3)  ibuprofen  Tablet 400milliGRAM(s) Oral every 6 hours PRN moderate pain  dextrose Gel 1Dose(s) Oral once PRN Blood Glucose LESS THAN 70 milliGRAM(s)/deciliter  glucagon  Injectable 1milliGRAM(s) IntraMuscular once PRN Glucose LESS THAN 70 milligrams/deciliter      Physical Exam:  Constitutional: NAD, A+O x3  CV: RRR  Lungs: clear to auscultation bilaterally  Abdomen: soft, + bowel sounds, appropriately tender, nondistended, no rebound or guarding  : no bleeding on peripad, which she has been wearing overnight  Extremities: no lower extremity edema or calf tenderness bilaterally, venodynes in place    Labs:                        6.4    13.8  )-----------( 264      ( 23 Jun 2017 11:12 )             17.7   baso 0.3    eos 0.6    imm gran x      lymph 15.5   mono 5.5    poly 78.1     06-23    134<L>  |  99     |  11     ----------------------------<  376<H>  4.4     |  23     |  0.71     Ca    8.9        23 Jun 2017 11:12    TPro  5.9<L>  /  Alb  3.4    /  TBili  0.2    /  DBili  x      /  AST  14     /  ALT  13     /  AlkPhos  74     06-23        PT/INR - ( 23 Jun 2017 11:12 )   PT: 10.5 sec;   INR: 0.97 ratio         PTT - ( 23 Jun 2017 11:12 )  PTT:22.2 sec    TVUS 6/23:   Uterus: 9.6 x 6.2 x 7.1 cm. Diffusely heterogeneous myometrium. A more focal heterogeneous ill-defined lesion in the anterior   myometrium measures approximately 2.3 x 3.1 cm, demonstrates some internal vascularity and partially compresses the endometrium..  Endometrium: 6 mm. Small amount of fluid in the endometrial canal.  Right ovary: Measures 1.6 x 1.1 x 1.8 cm.   Left ovary: Measures 1.7 x 1 x 1 cm.   Free fluid: None.  IMPRESSION:  Diffusely heterogeneous appearance of the myometrium with a more focal   ill-defined heterogeneous lesion in the anterior myometrium. These   findings may be secondary to adenomyosis vs. other etiologies.      Neuro: PCA for pain control. //Continue oral meds for pain control  CV: Hemodynamically stable  Pulm: Saturating well on RA. Increase incentive spirometry, out of bed, and ambulation as tolerated.  GI:   :   Heme: Continue HSQ/Lovenox/Venodynes for DVT ppx. Increase OOB and ambulation. Gyn Progress Note HD # 2    Subjective:     Pt seen and examined at bedside. She was transfused 2U PRBC overnight, which she tolerated well. She was refusing blood draw at the 4hr post transfusion CBC, but accepted a blood draw at 645AM. Denies any further vaginal bleeding. Denies any pain. Patient ambulating. Passing flatus. Tolerating regular diet. Pt denies fever, chills, chest pain, SOB, nausea, vomiting, lightheadedness, dizziness.      Objective:  T(F): 98.5, Max: 98.9 (06-23 @ 21:30)  HR: 77 (77 - 125)  BP: 135/81 (120/79 - 143/84)  RR: 17 (17 - 18)  SpO2: 98% (98% - 100%)  I&O's Summary    I & Os for current day (as of 24 Jun 2017 07:12)  =============================================  IN: 120 ml / OUT: 1 ml / NET: 119 ml    CAPILLARY BLOOD GLUCOSE  138 (23 Jun 2017 21:00)      MEDICATIONS  (STANDING):  lactated ringers. 1000milliLiter(s) IV Continuous <Continuous>  multivitamin 1Tablet(s) Oral daily  losartan 50milliGRAM(s) Oral daily  metoprolol 25milliGRAM(s) Oral every 12 hours  levothyroxine 137MICROGram(s) Oral daily  insulin lispro (HumaLOG) corrective regimen sliding scale  SubCutaneous three times a day before meals  dextrose 5%. 1000milliLiter(s) IV Continuous <Continuous>  dextrose 50% Injectable 12.5Gram(s) IV Push once  dextrose 50% Injectable 25Gram(s) IV Push once  dextrose 50% Injectable 25Gram(s) IV Push once  clopidogrel Tablet 75milliGRAM(s) Oral daily  insulin glargine Injectable (LANTUS) 32Unit(s) SubCutaneous at bedtime  insulin lispro Injectable (HumaLOG) 10Unit(s) SubCutaneous three times a day before meals  insulin lispro (HumaLOG) corrective regimen sliding scale  SubCutaneous at bedtime    MEDICATIONS  (PRN):  acetaminophen   Tablet. 650milliGRAM(s) Oral every 6 hours PRN Mild Pain (1 - 3)  ibuprofen  Tablet 400milliGRAM(s) Oral every 6 hours PRN moderate pain  dextrose Gel 1Dose(s) Oral once PRN Blood Glucose LESS THAN 70 milliGRAM(s)/deciliter  glucagon  Injectable 1milliGRAM(s) IntraMuscular once PRN Glucose LESS THAN 70 milligrams/deciliter      Physical Exam:  Constitutional: NAD, A+O x3  CV: RRR  Lungs: clear to auscultation bilaterally  Abdomen: soft, + bowel sounds, appropriately tender, nondistended, no rebound or guarding  : no bleeding on peripad, which she has been wearing overnight  Extremities: no lower extremity edema or calf tenderness bilaterally, venodynes in place    Labs:                        6.4    13.8  )-----------( 264      ( 23 Jun 2017 11:12 )             17.7   baso 0.3    eos 0.6    imm gran x      lymph 15.5   mono 5.5    poly 78.1     06-23    134<L>  |  99     |  11     ----------------------------<  376<H>  4.4     |  23     |  0.71     Ca    8.9        23 Jun 2017 11:12    TPro  5.9<L>  /  Alb  3.4    /  TBili  0.2    /  DBili  x      /  AST  14     /  ALT  13     /  AlkPhos  74     06-23        PT/INR - ( 23 Jun 2017 11:12 )   PT: 10.5 sec;   INR: 0.97 ratio         PTT - ( 23 Jun 2017 11:12 )  PTT:22.2 sec    TVUS 6/23:   Uterus: 9.6 x 6.2 x 7.1 cm. Diffusely heterogeneous myometrium. A more focal heterogeneous ill-defined lesion in the anterior   myometrium measures approximately 2.3 x 3.1 cm, demonstrates some internal vascularity and partially compresses the endometrium..  Endometrium: 6 mm. Small amount of fluid in the endometrial canal.  Right ovary: Measures 1.6 x 1.1 x 1.8 cm.   Left ovary: Measures 1.7 x 1 x 1 cm.   Free fluid: None.  IMPRESSION:  Diffusely heterogeneous appearance of the myometrium with a more focal   ill-defined heterogeneous lesion in the anterior myometrium. These   findings may be secondary to adenomyosis vs. other etiologies.

## 2017-06-24 NOTE — PROGRESS NOTE ADULT - ASSESSMENT
Assessment/Plan: 47y female admitted with symptomatic anemia 2/2 AUB s/p 2U PRBC with no further bleeding noted

## 2017-06-24 NOTE — PROGRESS NOTE ADULT - PROBLEM SELECTOR PLAN 1
-Neuro: not requiring pain control  -Pulm: oxygenating well on room air. encourage oob/amb  -CV: hemodynamically stable. continue metoprolol, amlodipine, and losartan home BP medications. cardiology consulted re: discontinuing plavix, will follow up recommendations today  -GI: continue ADA regular diet  -: voiding spontaneously. monitor bleeding with pad counts.  -Heme: f/u post transfusion CBC. SCD/oob/amb for DVT ppx.   -Endo: Lantus 32QHS, Humalog 10 premeal, low ISS qmeal, low ISS QHS per endocrine recommendations. monitor FS  -Dispo: Pt desires to be discharged. Ok to dc home if CBC wayne appropriately after transfusion. Pt understands the importance of follow up with GYN for further management of AUB. She was instructed to take iron, vitamin C, and colace three times a day. She also understands the importance of cardiology follow up.

## 2017-06-24 NOTE — PROGRESS NOTE ADULT - ATTENDING COMMENTS
I have seen and examined the patient and agree with above assessment and plan.  Patient denies vaginal bleeding, feels better since transfusion, H/H has risen appropriately to 8.6/25.2, vital signs are normal.  Will discharge patient home with micronor, she will follow up in clinic, discussed Mirena IUD

## 2017-06-24 NOTE — DISCHARGE NOTE ADULT - PLAN OF CARE
GYN management of abnormal uterine bleeding Please call [phone] for a GYN appointment 1-2 weeks from discharge. Take iron supplements with vitamin c and colace three times a day for anemia. Seek medical attention if you have uncontrolled vaginal bleeding, chest pain, difficulty breathing, or weakness Please call [phone] for a GYN appointment 1-2 weeks from discharge. Take micronor daily. Take iron supplements with vitamin c and colace three times a day for anemia. Seek medical attention if you have uncontrolled vaginal bleeding, chest pain, difficulty breathing, or weakness

## 2017-07-10 ENCOUNTER — LABORATORY RESULT (OUTPATIENT)
Age: 47
End: 2017-07-10

## 2017-07-10 ENCOUNTER — OUTPATIENT (OUTPATIENT)
Dept: OUTPATIENT SERVICES | Facility: HOSPITAL | Age: 47
LOS: 1 days | End: 2017-07-10
Payer: COMMERCIAL

## 2017-07-10 ENCOUNTER — RESULT REVIEW (OUTPATIENT)
Age: 47
End: 2017-07-10

## 2017-07-10 ENCOUNTER — APPOINTMENT (OUTPATIENT)
Dept: OBGYN | Facility: CLINIC | Age: 47
End: 2017-07-10

## 2017-07-10 ENCOUNTER — RESULT CHARGE (OUTPATIENT)
Age: 47
End: 2017-07-10

## 2017-07-10 VITALS — WEIGHT: 174 LBS | SYSTOLIC BLOOD PRESSURE: 130 MMHG | DIASTOLIC BLOOD PRESSURE: 80 MMHG | BODY MASS INDEX: 28.08 KG/M2

## 2017-07-10 DIAGNOSIS — N76.0 ACUTE VAGINITIS: ICD-10-CM

## 2017-07-10 DIAGNOSIS — Z01.419 ENCOUNTER FOR GYNECOLOGICAL EXAMINATION (GENERAL) (ROUTINE) W/OUT ABNORMAL FINDINGS: ICD-10-CM

## 2017-07-10 DIAGNOSIS — N89.8 OTHER SPECIFIED NONINFLAMMATORY DISORDERS OF VAGINA: ICD-10-CM

## 2017-07-10 DIAGNOSIS — N93.9 ABNORMAL UTERINE AND VAGINAL BLEEDING, UNSPECIFIED: ICD-10-CM

## 2017-07-10 PROCEDURE — G0463: CPT

## 2017-07-10 PROCEDURE — 87625 HPV TYPES 16 & 18 ONLY: CPT

## 2017-07-10 PROCEDURE — 88141 CYTOPATH C/V INTERPRET: CPT

## 2017-07-10 PROCEDURE — 88175 CYTOPATH C/V AUTO FLUID REDO: CPT

## 2017-07-10 PROCEDURE — 87624 HPV HI-RISK TYP POOLED RSLT: CPT

## 2017-07-11 PROBLEM — N89.8 VAGINAL IRRITATION: Status: ACTIVE | Noted: 2017-07-11

## 2017-07-11 PROBLEM — N93.9 ABNORMAL UTERINE BLEEDING (AUB): Status: ACTIVE | Noted: 2017-07-10

## 2017-07-11 LAB
C TRACH RRNA SPEC QL NAA+PROBE: SIGNIFICANT CHANGE UP
HCG UR QL: NEGATIVE
HPV HIGH+LOW RISK DNA PNL CVX: DETECTED
N GONORRHOEA RRNA SPEC QL NAA+PROBE: SIGNIFICANT CHANGE UP
SPECIMEN SOURCE: SIGNIFICANT CHANGE UP

## 2017-07-13 ENCOUNTER — MESSAGE (OUTPATIENT)
Age: 47
End: 2017-07-13

## 2017-07-13 LAB — CYTOLOGY SPEC DOC CYTO: SIGNIFICANT CHANGE UP

## 2017-07-25 DIAGNOSIS — E03.9 HYPOTHYROIDISM, UNSPECIFIED: ICD-10-CM

## 2017-07-25 DIAGNOSIS — Z01.419 ENCOUNTER FOR GYNECOLOGICAL EXAMINATION (GENERAL) (ROUTINE) WITHOUT ABNORMAL FINDINGS: ICD-10-CM

## 2017-07-25 DIAGNOSIS — N93.9 ABNORMAL UTERINE AND VAGINAL BLEEDING, UNSPECIFIED: ICD-10-CM

## 2017-07-25 DIAGNOSIS — N89.8 OTHER SPECIFIED NONINFLAMMATORY DISORDERS OF VAGINA: ICD-10-CM

## 2017-07-26 ENCOUNTER — APPOINTMENT (OUTPATIENT)
Dept: OBGYN | Facility: CLINIC | Age: 47
End: 2017-07-26

## 2017-08-09 ENCOUNTER — LABORATORY RESULT (OUTPATIENT)
Age: 47
End: 2017-08-09

## 2017-08-09 ENCOUNTER — APPOINTMENT (OUTPATIENT)
Dept: OBGYN | Facility: CLINIC | Age: 47
End: 2017-08-09
Payer: COMMERCIAL

## 2017-08-09 ENCOUNTER — OUTPATIENT (OUTPATIENT)
Dept: OUTPATIENT SERVICES | Facility: HOSPITAL | Age: 47
LOS: 1 days | End: 2017-08-09
Payer: COMMERCIAL

## 2017-08-09 DIAGNOSIS — N76.0 ACUTE VAGINITIS: ICD-10-CM

## 2017-08-09 PROCEDURE — 57454 BX/CURETT OF CERVIX W/SCOPE: CPT | Mod: GC

## 2017-08-09 PROCEDURE — 57454 BX/CURETT OF CERVIX W/SCOPE: CPT

## 2017-08-17 DIAGNOSIS — Z01.419 ENCOUNTER FOR GYNECOLOGICAL EXAMINATION (GENERAL) (ROUTINE) WITHOUT ABNORMAL FINDINGS: ICD-10-CM

## 2017-08-30 ENCOUNTER — OUTPATIENT (OUTPATIENT)
Dept: OUTPATIENT SERVICES | Facility: HOSPITAL | Age: 47
LOS: 1 days | End: 2017-08-30
Payer: COMMERCIAL

## 2017-08-30 ENCOUNTER — APPOINTMENT (OUTPATIENT)
Dept: OBGYN | Facility: CLINIC | Age: 47
End: 2017-08-30
Payer: COMMERCIAL

## 2017-08-30 DIAGNOSIS — N76.0 ACUTE VAGINITIS: ICD-10-CM

## 2017-08-30 PROCEDURE — G0463: CPT

## 2017-08-30 PROCEDURE — 99213 OFFICE O/P EST LOW 20 MIN: CPT | Mod: GE

## 2017-09-05 DIAGNOSIS — R87.619 UNSPECIFIED ABNORMAL CYTOLOGICAL FINDINGS IN SPECIMENS FROM CERVIX UTERI: ICD-10-CM

## 2017-09-28 ENCOUNTER — OUTPATIENT (OUTPATIENT)
Dept: OUTPATIENT SERVICES | Facility: HOSPITAL | Age: 47
LOS: 1 days | End: 2017-09-28
Payer: COMMERCIAL

## 2017-09-28 VITALS
SYSTOLIC BLOOD PRESSURE: 120 MMHG | HEART RATE: 73 BPM | DIASTOLIC BLOOD PRESSURE: 75 MMHG | HEIGHT: 66 IN | WEIGHT: 169.98 LBS | TEMPERATURE: 98 F | OXYGEN SATURATION: 100 % | RESPIRATION RATE: 20 BRPM

## 2017-09-28 DIAGNOSIS — I10 ESSENTIAL (PRIMARY) HYPERTENSION: ICD-10-CM

## 2017-09-28 DIAGNOSIS — E10.9 TYPE 1 DIABETES MELLITUS WITHOUT COMPLICATIONS: ICD-10-CM

## 2017-09-28 DIAGNOSIS — Z95.5 PRESENCE OF CORONARY ANGIOPLASTY IMPLANT AND GRAFT: ICD-10-CM

## 2017-09-28 DIAGNOSIS — Z01.818 ENCOUNTER FOR OTHER PREPROCEDURAL EXAMINATION: ICD-10-CM

## 2017-09-28 DIAGNOSIS — N87.0 MILD CERVICAL DYSPLASIA: ICD-10-CM

## 2017-09-28 DIAGNOSIS — N87.9 DYSPLASIA OF CERVIX UTERI, UNSPECIFIED: ICD-10-CM

## 2017-09-28 LAB
ANION GAP SERPL CALC-SCNC: 18 MMOL/L — HIGH (ref 5–17)
BUN SERPL-MCNC: 8 MG/DL — SIGNIFICANT CHANGE UP (ref 7–23)
CALCIUM SERPL-MCNC: 10.5 MG/DL — SIGNIFICANT CHANGE UP (ref 8.4–10.5)
CHLORIDE SERPL-SCNC: 100 MMOL/L — SIGNIFICANT CHANGE UP (ref 96–108)
CO2 SERPL-SCNC: 22 MMOL/L — SIGNIFICANT CHANGE UP (ref 22–31)
CREAT SERPL-MCNC: 0.67 MG/DL — SIGNIFICANT CHANGE UP (ref 0.5–1.3)
GLUCOSE SERPL-MCNC: 221 MG/DL — HIGH (ref 70–99)
HBA1C BLD-MCNC: 9.6 % — HIGH (ref 4–5.6)
HCT VFR BLD CALC: 43.3 % — SIGNIFICANT CHANGE UP (ref 34.5–45)
HGB BLD-MCNC: 14.7 G/DL — SIGNIFICANT CHANGE UP (ref 11.5–15.5)
MCHC RBC-ENTMCNC: 32.4 PG — SIGNIFICANT CHANGE UP (ref 27–34)
MCHC RBC-ENTMCNC: 33.9 GM/DL — SIGNIFICANT CHANGE UP (ref 32–36)
MCV RBC AUTO: 95.4 FL — SIGNIFICANT CHANGE UP (ref 80–100)
PLATELET # BLD AUTO: 256 K/UL — SIGNIFICANT CHANGE UP (ref 150–400)
POTASSIUM SERPL-MCNC: 4.5 MMOL/L — SIGNIFICANT CHANGE UP (ref 3.5–5.3)
POTASSIUM SERPL-SCNC: 4.5 MMOL/L — SIGNIFICANT CHANGE UP (ref 3.5–5.3)
RBC # BLD: 4.54 M/UL — SIGNIFICANT CHANGE UP (ref 3.8–5.2)
RBC # FLD: 12.5 % — SIGNIFICANT CHANGE UP (ref 10.3–14.5)
SODIUM SERPL-SCNC: 140 MMOL/L — SIGNIFICANT CHANGE UP (ref 135–145)
WBC # BLD: 10.02 K/UL — SIGNIFICANT CHANGE UP (ref 3.8–10.5)
WBC # FLD AUTO: 10.02 K/UL — SIGNIFICANT CHANGE UP (ref 3.8–10.5)

## 2017-09-28 PROCEDURE — 85027 COMPLETE CBC AUTOMATED: CPT

## 2017-09-28 PROCEDURE — 80048 BASIC METABOLIC PNL TOTAL CA: CPT

## 2017-09-28 PROCEDURE — G0463: CPT

## 2017-09-28 PROCEDURE — 83036 HEMOGLOBIN GLYCOSYLATED A1C: CPT

## 2017-09-28 RX ORDER — ASPIRIN/CALCIUM CARB/MAGNESIUM 324 MG
1 TABLET ORAL
Qty: 0 | Refills: 0 | COMMUNITY

## 2017-09-28 RX ORDER — LOSARTAN POTASSIUM 100 MG/1
50 TABLET, FILM COATED ORAL
Qty: 0 | Refills: 0 | COMMUNITY

## 2017-09-28 RX ORDER — CLOPIDOGREL BISULFATE 75 MG/1
75 TABLET, FILM COATED ORAL
Qty: 0 | Refills: 0 | COMMUNITY

## 2017-09-28 RX ORDER — ACETAMINOPHEN 500 MG
975 TABLET ORAL ONCE
Qty: 0 | Refills: 0 | Status: COMPLETED | OUTPATIENT
Start: 2017-10-05 | End: 2017-10-05

## 2017-09-28 RX ORDER — METOPROLOL TARTRATE 50 MG
25 TABLET ORAL
Qty: 0 | Refills: 0 | COMMUNITY

## 2017-09-28 RX ORDER — SODIUM CHLORIDE 9 MG/ML
3 INJECTION INTRAMUSCULAR; INTRAVENOUS; SUBCUTANEOUS EVERY 8 HOURS
Qty: 0 | Refills: 0 | Status: DISCONTINUED | OUTPATIENT
Start: 2017-10-05 | End: 2017-10-20

## 2017-09-28 RX ORDER — AMLODIPINE BESYLATE 2.5 MG/1
5 TABLET ORAL
Qty: 0 | Refills: 0 | COMMUNITY

## 2017-09-28 RX ORDER — LIDOCAINE HCL 20 MG/ML
0.2 VIAL (ML) INJECTION ONCE
Qty: 0 | Refills: 0 | Status: DISCONTINUED | OUTPATIENT
Start: 2017-10-05 | End: 2017-10-20

## 2017-09-28 RX ORDER — INSULIN ASPART 100 [IU]/ML
10 INJECTION, SOLUTION SUBCUTANEOUS
Qty: 0 | Refills: 0 | COMMUNITY

## 2017-09-28 RX ORDER — ATORVASTATIN CALCIUM 80 MG/1
1 TABLET, FILM COATED ORAL
Qty: 0 | Refills: 0 | COMMUNITY

## 2017-09-28 RX ORDER — FUROSEMIDE 40 MG
1 TABLET ORAL
Qty: 0 | Refills: 0 | COMMUNITY

## 2017-09-28 RX ORDER — INSULIN GLARGINE 100 [IU]/ML
60 INJECTION, SOLUTION SUBCUTANEOUS
Qty: 0 | Refills: 0 | COMMUNITY

## 2017-09-28 NOTE — H&P PST ADULT - ATTENDING COMMENTS
patient with cervical dysplasia on ECC is here for cone biopsy for further diagnosis and treatement . Risks/benefits and alternatives discussed with patient

## 2017-09-28 NOTE — H&P PST ADULT - PROBLEM SELECTOR PLAN 3
Check FS day of surgery  Pt with type 1 DM - No endocrinologist at this time, being followed by PMD , advised pt to get MC .

## 2017-09-28 NOTE — H&P PST ADULT - PMH
CAD (Coronary Artery Disease)    Essential Hypertension    History of Hypothyroidism    Hypercholesteremia    Type 1 diabetes mellitus, with long-term current use of insulin  age    7 Anemia due to blood loss  Menorrhagia- 2017 June - 2 U PRBC Placed, Mirena IUD placement 6/2017  CAD (Coronary Artery Disease)    Cervical dysplasia    Essential Hypertension    History of Hypothyroidism    Hypercholesteremia    ST elevation myocardial infarction involving right coronary artery  6/26/2010- S/P 2 STENTS KELSEY  Stented coronary artery  2010 x 2 - KELSEY, 2012 X1 KELSEY  Type 1 diabetes mellitus, with long-term current use of insulin  age    7  Vitamin D deficiency

## 2017-09-28 NOTE — H&P PST ADULT - HISTORY OF PRESENT ILLNESS
47 yr old female with history of ,TYPE 1 DM since age 3- on Toujeo & Novolog, MI (2010) - S/P 2 KELSEY, S/P 1 KELSEY 2012 - on Plavix & aspirin, CAD, HTN, Hyperlipidemia, hypothyroid with recent abnormal pap smear, s/p colposcopy, -cervical dysplasia. Now coming in for Cone biopsy on 10/5/17. 47 yr old female with history of ,TYPE 1 DM since age 3- on Toujeo & Novolog, MI (2010) - S/P 2 KELSEY, S/P 1 KELSEY 2012 - on Plavix & aspirin, CAD, HTN, Hyperlipidemia, hypothyroid with recent abnormal pap smear, s/p colposcopy, -cervical dysplasia. Now coming in for Cone biopsy on 10/5/17.  **************Pt states that she was advised by clinic to stay on Plavix until DOS. Called Surgeon office to confirm, Awaiting call back for plan.  Call back received - from Ana- office was not aware pt was on Plavix, and needs to get plan for Plavix & aspirin from cardio / PMD.  Called pt at 2.52 pm on 9/28/17 and relayed the plan. Case also discussed with Dr. Aggarwal. *****************    ******************Pt with type 1 DM - No endocrinologist at this time, being followed by PMD , advised pt to get MC .***********************

## 2017-09-28 NOTE — H&P PST ADULT - LAST STRESS TEST
long time ago Pt presents to the Ed s/p trip and fall.   Denies hitting her head, no LOC. no anti-coags.

## 2017-09-28 NOTE — H&P PST ADULT - RS GEN PE MLT RESP DETAILS PC
clear to auscultation bilaterally/respirations non-labored/normal/breath sounds equal/good air movement/airway patent

## 2017-09-28 NOTE — H&P PST ADULT - PROBLEM SELECTOR PLAN 4
Stented coronary artery - to stay on aspirin until DOS  Pt states that she was advised by clinic to stay on Plavix until DOS. Called Surgeon office to confirm, Awaiting call back for plan. Stented coronary artery - to stay on aspirin until DOS  Pt states that she was advised by clinic to stay on Plavix until DOS. Called Surgeon office to confirm, Awaiting call back for plan.  Call back received - from Ana- office was not aware pt was on Plavix, and needs to get plan for Plavix & aspirin from cardio / PMD.  Called pt at 2.52 pm on 9/28/17 and relayed the plan. Case also discussed with Dr. Aggarwal.

## 2017-09-28 NOTE — H&P PST ADULT - NSANTHOSAYNRD_GEN_A_CORE
No. FRANCOISE screening performed.  STOP BANG Legend: 0-2 = LOW Risk; 3-4 = INTERMEDIATE Risk; 5-8 = HIGH Risk

## 2017-10-05 ENCOUNTER — OUTPATIENT (OUTPATIENT)
Dept: OUTPATIENT SERVICES | Facility: HOSPITAL | Age: 47
LOS: 1 days | End: 2017-10-05
Payer: COMMERCIAL

## 2017-10-05 ENCOUNTER — APPOINTMENT (OUTPATIENT)
Dept: OBGYN | Facility: CLINIC | Age: 47
End: 2017-10-05

## 2017-10-05 ENCOUNTER — RESULT REVIEW (OUTPATIENT)
Age: 47
End: 2017-10-05

## 2017-10-05 VITALS
OXYGEN SATURATION: 100 % | WEIGHT: 169.98 LBS | SYSTOLIC BLOOD PRESSURE: 132 MMHG | HEIGHT: 66 IN | HEART RATE: 73 BPM | RESPIRATION RATE: 20 BRPM | DIASTOLIC BLOOD PRESSURE: 76 MMHG | TEMPERATURE: 98 F

## 2017-10-05 VITALS
RESPIRATION RATE: 16 BRPM | TEMPERATURE: 99 F | DIASTOLIC BLOOD PRESSURE: 77 MMHG | OXYGEN SATURATION: 100 % | HEART RATE: 67 BPM | SYSTOLIC BLOOD PRESSURE: 139 MMHG

## 2017-10-05 DIAGNOSIS — N87.0 MILD CERVICAL DYSPLASIA: ICD-10-CM

## 2017-10-05 PROCEDURE — 88342 IMHCHEM/IMCYTCHM 1ST ANTB: CPT

## 2017-10-05 PROCEDURE — 57520 CONIZATION OF CERVIX: CPT

## 2017-10-05 PROCEDURE — 88307 TISSUE EXAM BY PATHOLOGIST: CPT

## 2017-10-05 PROCEDURE — 88305 TISSUE EXAM BY PATHOLOGIST: CPT | Mod: 26

## 2017-10-05 PROCEDURE — 88307 TISSUE EXAM BY PATHOLOGIST: CPT | Mod: 26

## 2017-10-05 PROCEDURE — 88341 IMHCHEM/IMCYTCHM EA ADD ANTB: CPT

## 2017-10-05 PROCEDURE — 88305 TISSUE EXAM BY PATHOLOGIST: CPT

## 2017-10-05 PROCEDURE — 88342 IMHCHEM/IMCYTCHM 1ST ANTB: CPT | Mod: 26

## 2017-10-05 PROCEDURE — 88341 IMHCHEM/IMCYTCHM EA ADD ANTB: CPT | Mod: 26

## 2017-10-05 RX ORDER — CELECOXIB 200 MG/1
200 CAPSULE ORAL ONCE
Qty: 0 | Refills: 0 | Status: COMPLETED | OUTPATIENT
Start: 2017-10-05 | End: 2017-10-05

## 2017-10-05 RX ORDER — ONDANSETRON 8 MG/1
4 TABLET, FILM COATED ORAL ONCE
Qty: 0 | Refills: 0 | Status: DISCONTINUED | OUTPATIENT
Start: 2017-10-05 | End: 2017-10-20

## 2017-10-05 RX ORDER — OXYCODONE HYDROCHLORIDE 5 MG/1
5 TABLET ORAL ONCE
Qty: 0 | Refills: 0 | Status: DISCONTINUED | OUTPATIENT
Start: 2017-10-05 | End: 2017-10-05

## 2017-10-05 RX ORDER — CELECOXIB 200 MG/1
200 CAPSULE ORAL ONCE
Qty: 0 | Refills: 0 | Status: DISCONTINUED | OUTPATIENT
Start: 2017-10-05 | End: 2017-10-20

## 2017-10-05 RX ORDER — SODIUM CHLORIDE 9 MG/ML
1000 INJECTION, SOLUTION INTRAVENOUS
Qty: 0 | Refills: 0 | Status: DISCONTINUED | OUTPATIENT
Start: 2017-10-05 | End: 2017-10-20

## 2017-10-05 RX ADMIN — Medication 975 MILLIGRAM(S): at 09:10

## 2017-10-05 RX ADMIN — CELECOXIB 200 MILLIGRAM(S): 200 CAPSULE ORAL at 09:10

## 2017-10-05 NOTE — ASU PATIENT PROFILE, ADULT - PMH
Anemia due to blood loss  Menorrhagia- 2017 June - 2 U PRBC Placed, Mirena IUD placement 6/2017  CAD (Coronary Artery Disease)    Cervical dysplasia    Essential Hypertension    History of Hypothyroidism    Hypercholesteremia    ST elevation myocardial infarction involving right coronary artery  6/26/2010- S/P 2 STENTS KELSEY  Stented coronary artery  2010 x 2 - KELSEY, 2012 X1 KELSEY  Type 1 diabetes mellitus, with long-term current use of insulin  age    7  Vitamin D deficiency

## 2017-10-05 NOTE — ASU DISCHARGE PLAN (ADULT/PEDIATRIC). - NOTIFY
Swelling that continues/GYN Fever>100.4/Increased Irritability or Sluggishness/Excessive Diarrhea/Inability to Tolerate Liquids or Foods/Numbness, tingling/Bleeding that does not stop/Numbness, color, or temperature change to extremity/Persistent Nausea and Vomiting/Unable to Urinate/Pain not relieved by Medications

## 2017-10-05 NOTE — ASU DISCHARGE PLAN (ADULT/PEDIATRIC). - MEDICATION SUMMARY - MEDICATIONS TO TAKE
I will START or STAY ON the medications listed below when I get home from the hospital:    aspirin 81 mg oral tablet  -- 1 tab(s) by mouth once a day  -- Indication: For home med    losartan 50 mg oral tablet  -- 1 tab(s) by mouth once a day  -- Indication: For home emd    NovoLOG 100 units/mL subcutaneous solution  -- 7  subcutaneous 3 times a day- varies according to BS  -- Indication: For home emd    Toujeo SoloStar 300 units/mL subcutaneous solution  -- 50 unit(s) subcutaneous once a day (at bedtime)  -- Indication: For home med    atorvastatin 40 mg oral tablet  -- 1 tab(s) by mouth once a day  -- Indication: For home med    clopidogrel 75 mg oral tablet  -- 1 tab(s) by mouth once a day  -- Indication: For home med    metoprolol tartrate 25 mg oral tablet  -- 1 tab(s) by mouth 2 times a day  -- Indication: For home med    famotidine 20 mg oral tablet  -- 1 tab(s) by mouth once, 1 tab night prior to surgery, 1 tab day of surgery      -- Indication: For home med    ferrous sulfate 325 mg (65 mg elemental iron) oral delayed release tablet  -- 1 tab(s) by mouth once a day  -- Indication: For home med    levothyroxine 137 mcg (0.137 mg) oral tablet  -- 1 tab(s) by mouth once a day  -- Indication: For home med    Calcium 600+D 600 mg-200 intl units oral tablet  -- 1 tab(s) by mouth 2 times a day  -- Indication: For home med    Vitamin D2 50,000 intl units (1.25 mg) oral capsule  -- 1 cap(s) by mouth once a week  -- Indication: For home med

## 2017-10-06 ENCOUNTER — TRANSCRIPTION ENCOUNTER (OUTPATIENT)
Age: 47
End: 2017-10-06

## 2017-10-12 ENCOUNTER — APPOINTMENT (OUTPATIENT)
Dept: OBGYN | Facility: CLINIC | Age: 47
End: 2017-10-12
Payer: COMMERCIAL

## 2017-10-12 ENCOUNTER — OUTPATIENT (OUTPATIENT)
Dept: OUTPATIENT SERVICES | Facility: HOSPITAL | Age: 47
LOS: 1 days | End: 2017-10-12
Payer: COMMERCIAL

## 2017-10-12 DIAGNOSIS — N76.0 ACUTE VAGINITIS: ICD-10-CM

## 2017-10-12 LAB — SURGICAL PATHOLOGY STUDY: SIGNIFICANT CHANGE UP

## 2017-10-12 PROCEDURE — G0463: CPT

## 2017-10-12 PROCEDURE — 99213 OFFICE O/P EST LOW 20 MIN: CPT | Mod: GC

## 2017-10-12 PROCEDURE — 99024 POSTOP FOLLOW-UP VISIT: CPT

## 2017-10-18 ENCOUNTER — OUTPATIENT (OUTPATIENT)
Dept: OUTPATIENT SERVICES | Facility: HOSPITAL | Age: 47
LOS: 1 days | End: 2017-10-18
Payer: COMMERCIAL

## 2017-10-18 ENCOUNTER — APPOINTMENT (OUTPATIENT)
Dept: OBGYN | Facility: CLINIC | Age: 47
End: 2017-10-18
Payer: COMMERCIAL

## 2017-10-18 VITALS
BODY MASS INDEX: 27.64 KG/M2 | SYSTOLIC BLOOD PRESSURE: 140 MMHG | DIASTOLIC BLOOD PRESSURE: 80 MMHG | HEIGHT: 66 IN | WEIGHT: 172 LBS

## 2017-10-18 DIAGNOSIS — N76.0 ACUTE VAGINITIS: ICD-10-CM

## 2017-10-18 PROCEDURE — G0463: CPT

## 2017-10-18 PROCEDURE — 99213 OFFICE O/P EST LOW 20 MIN: CPT | Mod: NC

## 2017-10-23 DIAGNOSIS — Z98.890 OTHER SPECIFIED POSTPROCEDURAL STATES: ICD-10-CM

## 2017-10-24 DIAGNOSIS — Z09 ENCOUNTER FOR FOLLOW-UP EXAMINATION AFTER COMPLETED TREATMENT FOR CONDITIONS OTHER THAN MALIGNANT NEOPLASM: ICD-10-CM

## 2017-10-26 ENCOUNTER — NON-APPOINTMENT (OUTPATIENT)
Age: 47
End: 2017-10-26

## 2017-10-26 ENCOUNTER — APPOINTMENT (OUTPATIENT)
Dept: CARDIOLOGY | Facility: CLINIC | Age: 47
End: 2017-10-26
Payer: COMMERCIAL

## 2017-10-26 VITALS
BODY MASS INDEX: 27.32 KG/M2 | HEIGHT: 66 IN | DIASTOLIC BLOOD PRESSURE: 86 MMHG | SYSTOLIC BLOOD PRESSURE: 153 MMHG | HEART RATE: 81 BPM | WEIGHT: 170 LBS | OXYGEN SATURATION: 99 %

## 2017-10-26 PROCEDURE — 93000 ELECTROCARDIOGRAM COMPLETE: CPT

## 2017-10-26 PROCEDURE — 99214 OFFICE O/P EST MOD 30 MIN: CPT

## 2017-10-27 ENCOUNTER — APPOINTMENT (OUTPATIENT)
Dept: OBGYN | Facility: CLINIC | Age: 47
End: 2017-10-27

## 2017-11-16 ENCOUNTER — OUTPATIENT (OUTPATIENT)
Dept: OUTPATIENT SERVICES | Facility: HOSPITAL | Age: 47
LOS: 1 days | End: 2017-11-16
Payer: COMMERCIAL

## 2017-11-16 ENCOUNTER — APPOINTMENT (OUTPATIENT)
Dept: OBGYN | Facility: CLINIC | Age: 47
End: 2017-11-16
Payer: COMMERCIAL

## 2017-11-16 VITALS
DIASTOLIC BLOOD PRESSURE: 80 MMHG | WEIGHT: 173 LBS | HEIGHT: 66 IN | SYSTOLIC BLOOD PRESSURE: 140 MMHG | BODY MASS INDEX: 27.8 KG/M2

## 2017-11-16 DIAGNOSIS — N76.0 ACUTE VAGINITIS: ICD-10-CM

## 2017-11-16 PROCEDURE — G0463: CPT

## 2017-11-16 PROCEDURE — 99213 OFFICE O/P EST LOW 20 MIN: CPT | Mod: NC

## 2017-11-27 DIAGNOSIS — Z09 ENCOUNTER FOR FOLLOW-UP EXAMINATION AFTER COMPLETED TREATMENT FOR CONDITIONS OTHER THAN MALIGNANT NEOPLASM: ICD-10-CM

## 2017-11-28 ENCOUNTER — RX RENEWAL (OUTPATIENT)
Age: 47
End: 2017-11-28

## 2017-12-20 ENCOUNTER — APPOINTMENT (OUTPATIENT)
Dept: ENDOCRINOLOGY | Facility: CLINIC | Age: 47
End: 2017-12-20

## 2017-12-29 ENCOUNTER — APPOINTMENT (OUTPATIENT)
Dept: ENDOCRINOLOGY | Facility: CLINIC | Age: 47
End: 2017-12-29
Payer: MEDICAID

## 2017-12-29 ENCOUNTER — LABORATORY RESULT (OUTPATIENT)
Age: 47
End: 2017-12-29

## 2017-12-29 ENCOUNTER — RX RENEWAL (OUTPATIENT)
Age: 47
End: 2017-12-29

## 2017-12-29 VITALS
HEIGHT: 66 IN | BODY MASS INDEX: 27.97 KG/M2 | DIASTOLIC BLOOD PRESSURE: 70 MMHG | SYSTOLIC BLOOD PRESSURE: 120 MMHG | HEART RATE: 74 BPM | OXYGEN SATURATION: 98 % | WEIGHT: 174 LBS

## 2017-12-29 DIAGNOSIS — Z80.52 FAMILY HISTORY OF MALIGNANT NEOPLASM OF BLADDER: ICD-10-CM

## 2017-12-29 PROCEDURE — 36415 COLL VENOUS BLD VENIPUNCTURE: CPT

## 2017-12-29 PROCEDURE — 83036 HEMOGLOBIN GLYCOSYLATED A1C: CPT | Mod: QW

## 2017-12-29 PROCEDURE — 99205 OFFICE O/P NEW HI 60 MIN: CPT | Mod: 25

## 2017-12-29 PROCEDURE — 82962 GLUCOSE BLOOD TEST: CPT

## 2017-12-29 RX ORDER — LOSARTAN POTASSIUM 50 MG/1
50 TABLET, FILM COATED ORAL
Refills: 0 | Status: DISCONTINUED | COMMUNITY
End: 2017-12-29

## 2017-12-29 RX ORDER — INSULIN GLARGINE 100 [IU]/ML
100 INJECTION, SOLUTION SUBCUTANEOUS
Qty: 30 | Refills: 0 | Status: DISCONTINUED | COMMUNITY
Start: 2017-12-21

## 2017-12-29 RX ORDER — INSULIN ASPART 100 [IU]/ML
100 INJECTION, SOLUTION INTRAVENOUS; SUBCUTANEOUS
Refills: 0 | Status: COMPLETED | COMMUNITY
Start: 2016-10-20 | End: 2017-12-29

## 2018-01-16 LAB
ALBUMIN SERPL ELPH-MCNC: 4.2 G/DL
ALP BLD-CCNC: 113 U/L
ALT SERPL-CCNC: 18 U/L
ANION GAP SERPL CALC-SCNC: 16 MMOL/L
AST SERPL-CCNC: 16 U/L
BILIRUB SERPL-MCNC: 0.2 MG/DL
BUN SERPL-MCNC: 8 MG/DL
C PEPTIDE SERPL-MCNC: <0.1 NG/ML
CALCIUM SERPL-MCNC: 9.6 MG/DL
CHLORIDE SERPL-SCNC: 99 MMOL/L
CO2 SERPL-SCNC: 25 MMOL/L
CREAT SERPL-MCNC: 0.62 MG/DL
CREAT SPEC-SCNC: 111 MG/DL
GAD65 AB SER-MCNC: 0.05 NMOL/L
GLUCOSE BLDC GLUCOMTR-MCNC: 292
GLUCOSE SERPL-MCNC: 201 MG/DL
HBA1C MFR BLD HPLC: 7.9
MICROALBUMIN 24H UR DL<=1MG/L-MCNC: 1.8 MG/DL
MICROALBUMIN/CREAT 24H UR-RTO: 16 MG/G
PANC ISLET CELL AB SER QL: <5 JDF UNITS
POTASSIUM SERPL-SCNC: 4.5 MMOL/L
PROT SERPL-MCNC: 6.7 G/DL
SODIUM SERPL-SCNC: 140 MMOL/L
T3RU NFR SERPL: 0.92 INDEX
T4 FREE SERPL-MCNC: 1.6 NG/DL
T4 SERPL-MCNC: 7.9 UG/DL
THYROGLOB AB SERPL-ACNC: 271 IU/ML
THYROPEROXIDASE AB SERPL IA-ACNC: 666 IU/ML
TSH SERPL-ACNC: 0.05 UIU/ML

## 2018-02-05 ENCOUNTER — RX RENEWAL (OUTPATIENT)
Age: 48
End: 2018-02-05

## 2018-02-06 ENCOUNTER — MEDICATION RENEWAL (OUTPATIENT)
Age: 48
End: 2018-02-06

## 2018-04-26 ENCOUNTER — NON-APPOINTMENT (OUTPATIENT)
Age: 48
End: 2018-04-26

## 2018-04-26 ENCOUNTER — APPOINTMENT (OUTPATIENT)
Dept: CARDIOLOGY | Facility: CLINIC | Age: 48
End: 2018-04-26
Payer: MEDICAID

## 2018-04-26 VITALS
BODY MASS INDEX: 28.45 KG/M2 | DIASTOLIC BLOOD PRESSURE: 80 MMHG | WEIGHT: 177 LBS | OXYGEN SATURATION: 98 % | HEART RATE: 85 BPM | HEIGHT: 66 IN | SYSTOLIC BLOOD PRESSURE: 149 MMHG

## 2018-04-26 PROCEDURE — 99214 OFFICE O/P EST MOD 30 MIN: CPT

## 2018-04-26 PROCEDURE — 93000 ELECTROCARDIOGRAM COMPLETE: CPT

## 2018-05-09 ENCOUNTER — MEDICATION RENEWAL (OUTPATIENT)
Age: 48
End: 2018-05-09

## 2018-06-05 ENCOUNTER — APPOINTMENT (OUTPATIENT)
Dept: ENDOCRINOLOGY | Facility: CLINIC | Age: 48
End: 2018-06-05
Payer: MEDICAID

## 2018-06-05 VITALS
BODY MASS INDEX: 29.09 KG/M2 | TEMPERATURE: 98.1 F | WEIGHT: 181 LBS | DIASTOLIC BLOOD PRESSURE: 78 MMHG | HEIGHT: 66 IN | HEART RATE: 73 BPM | SYSTOLIC BLOOD PRESSURE: 143 MMHG

## 2018-06-05 PROCEDURE — 36415 COLL VENOUS BLD VENIPUNCTURE: CPT

## 2018-06-05 PROCEDURE — 83036 HEMOGLOBIN GLYCOSYLATED A1C: CPT | Mod: QW

## 2018-06-05 PROCEDURE — 82962 GLUCOSE BLOOD TEST: CPT

## 2018-06-05 PROCEDURE — 99215 OFFICE O/P EST HI 40 MIN: CPT | Mod: 25

## 2018-06-05 RX ORDER — CHLORHEXIDINE GLUCONATE 4 %
325 (65 FE) LIQUID (ML) TOPICAL DAILY
Refills: 0 | Status: ACTIVE | COMMUNITY
Start: 2017-12-29

## 2018-06-10 ENCOUNTER — RESULT REVIEW (OUTPATIENT)
Age: 48
End: 2018-06-10

## 2018-06-13 ENCOUNTER — RX RENEWAL (OUTPATIENT)
Age: 48
End: 2018-06-13

## 2018-06-19 LAB
25(OH)D3 SERPL-MCNC: 39.4 NG/ML
ADRENAL AB SER-ACNC: <1 U/ML
ALBUMIN SERPL ELPH-MCNC: 4.8 G/DL
ALP BLD-CCNC: 112 U/L
ALT SERPL-CCNC: 23 U/L
ANION GAP SERPL CALC-SCNC: 7 MMOL/L
AST SERPL-CCNC: 18 U/L
BASOPHILS # BLD AUTO: 0.01 K/UL
BASOPHILS NFR BLD AUTO: 0.1 %
BILIRUB SERPL-MCNC: 0.3 MG/DL
BUN SERPL-MCNC: 11 MG/DL
CALCIUM SERPL-MCNC: 10.3 MG/DL
CHLORIDE SERPL-SCNC: 102 MMOL/L
CHOLEST SERPL-MCNC: 173 MG/DL
CHOLEST/HDLC SERPL: 2.7 RATIO
CO2 SERPL-SCNC: 33 MMOL/L
CREAT SERPL-MCNC: 0.65 MG/DL
CREAT SPEC-SCNC: 24 MG/DL
DHEA-S SERPL-MCNC: 88 UG/DL
EOSINOPHIL # BLD AUTO: 0.05 K/UL
EOSINOPHIL NFR BLD AUTO: 0.3 %
ESTRADIOL SERPL-MCNC: <5 PG/ML
FSH SERPL-MCNC: 48.3 IU/L
GLUCOSE BLDC GLUCOMTR-MCNC: 186
GLUCOSE SERPL-MCNC: 121 MG/DL
HBA1C MFR BLD HPLC: 8.3
HCT VFR BLD CALC: 43.1 %
HDLC SERPL-MCNC: 65 MG/DL
HGB BLD-MCNC: 14.1 G/DL
IMM GRANULOCYTES NFR BLD AUTO: 0.3 %
LDLC SERPL CALC-MCNC: 82 MG/DL
LH SERPL-ACNC: 14 IU/L
LYMPHOCYTES # BLD AUTO: 1.88 K/UL
LYMPHOCYTES NFR BLD AUTO: 12.7 %
MAN DIFF?: NORMAL
MCHC RBC-ENTMCNC: 32.6 PG
MCHC RBC-ENTMCNC: 32.7 GM/DL
MCV RBC AUTO: 99.8 FL
MICROALBUMIN 24H UR DL<=1MG/L-MCNC: 0.8 MG/DL
MICROALBUMIN/CREAT 24H UR-RTO: 33 MG/G
MONOCYTES # BLD AUTO: 0.34 K/UL
MONOCYTES NFR BLD AUTO: 2.3 %
NEUTROPHILS # BLD AUTO: 12.52 K/UL
NEUTROPHILS NFR BLD AUTO: 84.3 %
PLATELET # BLD AUTO: 346 K/UL
POTASSIUM SERPL-SCNC: 4.4 MMOL/L
PROT SERPL-MCNC: 7.6 G/DL
RBC # BLD: 4.32 M/UL
RBC # FLD: 12.7 %
SHBG SERPL-SCNC: 29 NMOL/L
SODIUM SERPL-SCNC: 142 MMOL/L
T4 FREE SERPL-MCNC: 1.6 NG/DL
TESTOST BND SERPL-MCNC: 0.22 NG/DL
TESTOST SERPL-MCNC: 5.6 NG/DL
TESTOSTERONE BIOAVAILABLE: 1.7 NG/DL
TESTOSTERONE TOTAL S: 9.6 NG/DL
THYROGLOB AB SERPL-ACNC: 175 IU/ML
THYROPEROXIDASE AB SERPL IA-ACNC: 421 IU/ML
TRIGL SERPL-MCNC: 132 MG/DL
TSH SERPL-ACNC: 0.04 UIU/ML
WBC # FLD AUTO: 14.84 K/UL

## 2018-07-09 ENCOUNTER — RX RENEWAL (OUTPATIENT)
Age: 48
End: 2018-07-09

## 2018-07-10 ENCOUNTER — RESULT REVIEW (OUTPATIENT)
Age: 48
End: 2018-07-10

## 2018-07-10 LAB
T4 FREE SERPL-MCNC: 1.2 NG/DL
TSH SERPL-ACNC: 0.13 UIU/ML

## 2018-07-16 PROBLEM — E10.9 TYPE 1 DIABETES MELLITUS WITHOUT COMPLICATIONS: Chronic | Status: ACTIVE | Noted: 2017-09-28

## 2018-07-23 ENCOUNTER — RX CHANGE (OUTPATIENT)
Age: 48
End: 2018-07-23

## 2018-08-07 ENCOUNTER — RX RENEWAL (OUTPATIENT)
Age: 48
End: 2018-08-07

## 2018-08-17 PROBLEM — I21.11 ST ELEVATION (STEMI) MYOCARDIAL INFARCTION INVOLVING RIGHT CORONARY ARTERY: Chronic | Status: ACTIVE | Noted: 2017-09-28

## 2018-08-17 PROBLEM — Z95.5 PRESENCE OF CORONARY ANGIOPLASTY IMPLANT AND GRAFT: Chronic | Status: ACTIVE | Noted: 2017-09-28

## 2018-08-17 PROBLEM — E55.9 VITAMIN D DEFICIENCY, UNSPECIFIED: Chronic | Status: ACTIVE | Noted: 2017-09-28

## 2018-08-17 PROBLEM — N87.9 DYSPLASIA OF CERVIX UTERI, UNSPECIFIED: Chronic | Status: ACTIVE | Noted: 2017-09-28

## 2018-08-17 PROBLEM — D50.0 IRON DEFICIENCY ANEMIA SECONDARY TO BLOOD LOSS (CHRONIC): Chronic | Status: ACTIVE | Noted: 2017-09-28

## 2018-09-06 NOTE — H&P PST ADULT - IS PATIENT PREGNANT?
Dr Aura Colón at bedside for pt evaluation     Jackelin Goodrich RN  09/06/18 [id number]
Pt ambulating to xray for neck soft tissue.       David Cabrera RN  09/06/18 [id number]
Pt is breathing without difficulty or wheezing as staff was walking into her room.       Maya Lazar RN  09/06/18 [id number]
Resident at bedside for pt evaluation      Sary Allen RN  09/06/18 [phone]
no

## 2018-09-11 ENCOUNTER — APPOINTMENT (OUTPATIENT)
Dept: ENDOCRINOLOGY | Facility: CLINIC | Age: 48
End: 2018-09-11

## 2018-09-14 ENCOUNTER — FORM ENCOUNTER (OUTPATIENT)
Age: 48
End: 2018-09-14

## 2018-09-15 ENCOUNTER — APPOINTMENT (OUTPATIENT)
Dept: ULTRASOUND IMAGING | Facility: IMAGING CENTER | Age: 48
End: 2018-09-15
Payer: MEDICAID

## 2018-09-15 ENCOUNTER — OUTPATIENT (OUTPATIENT)
Dept: OUTPATIENT SERVICES | Facility: HOSPITAL | Age: 48
LOS: 1 days | End: 2018-09-15
Payer: MEDICAID

## 2018-09-15 DIAGNOSIS — E06.3 AUTOIMMUNE THYROIDITIS: ICD-10-CM

## 2018-09-15 PROCEDURE — 76536 US EXAM OF HEAD AND NECK: CPT | Mod: 26

## 2018-09-15 PROCEDURE — 76536 US EXAM OF HEAD AND NECK: CPT

## 2018-09-27 ENCOUNTER — RX RENEWAL (OUTPATIENT)
Age: 48
End: 2018-09-27

## 2018-10-03 LAB
T4 FREE SERPL DIALY-MCNC: 1.3 NG/DL
T4 FREE SERPL-MCNC: 1.3 NG/DL
TSH SERPL-ACNC: 1.71 UIU/ML

## 2018-12-06 ENCOUNTER — APPOINTMENT (OUTPATIENT)
Dept: CARDIOLOGY | Facility: CLINIC | Age: 48
End: 2018-12-06

## 2018-12-14 ENCOUNTER — MEDICATION RENEWAL (OUTPATIENT)
Age: 48
End: 2018-12-14

## 2019-01-15 ENCOUNTER — MEDICATION RENEWAL (OUTPATIENT)
Age: 49
End: 2019-01-15

## 2019-02-05 ENCOUNTER — RX RENEWAL (OUTPATIENT)
Age: 49
End: 2019-02-05

## 2019-03-25 ENCOUNTER — EMERGENCY (EMERGENCY)
Facility: HOSPITAL | Age: 49
LOS: 0 days | Discharge: HOME | End: 2019-03-25
Attending: EMERGENCY MEDICINE
Payer: COMMERCIAL

## 2019-03-25 VITALS
HEIGHT: 66 IN | OXYGEN SATURATION: 99 % | RESPIRATION RATE: 18 BRPM | DIASTOLIC BLOOD PRESSURE: 73 MMHG | WEIGHT: 169.98 LBS | SYSTOLIC BLOOD PRESSURE: 147 MMHG | HEART RATE: 88 BPM | TEMPERATURE: 98 F

## 2019-03-25 VITALS
OXYGEN SATURATION: 98 % | RESPIRATION RATE: 16 BRPM | SYSTOLIC BLOOD PRESSURE: 137 MMHG | TEMPERATURE: 99 F | DIASTOLIC BLOOD PRESSURE: 80 MMHG | HEART RATE: 76 BPM

## 2019-03-25 DIAGNOSIS — E03.9 HYPOTHYROIDISM, UNSPECIFIED: ICD-10-CM

## 2019-03-25 DIAGNOSIS — N87.9 DYSPLASIA OF CERVIX UTERI, UNSPECIFIED: ICD-10-CM

## 2019-03-25 DIAGNOSIS — S16.1XXA STRAIN OF MUSCLE, FASCIA AND TENDON AT NECK LEVEL, INITIAL ENCOUNTER: ICD-10-CM
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
## 2019-03-25 DIAGNOSIS — D50.0 IRON DEFICIENCY ANEMIA SECONDARY TO BLOOD LOSS (CHRONIC): ICD-10-CM

## 2019-03-25 DIAGNOSIS — I25.2 OLD MYOCARDIAL INFARCTION: ICD-10-CM

## 2019-03-25 DIAGNOSIS — E10.9 TYPE 1 DIABETES MELLITUS WITHOUT COMPLICATIONS: ICD-10-CM

## 2019-03-25 DIAGNOSIS — Z79.4 LONG TERM (CURRENT) USE OF INSULIN: ICD-10-CM

## 2019-03-25 DIAGNOSIS — E78.00 PURE HYPERCHOLESTEROLEMIA, UNSPECIFIED: ICD-10-CM

## 2019-03-25 DIAGNOSIS — Z97.5 PRESENCE OF (INTRAUTERINE) CONTRACEPTIVE DEVICE: ICD-10-CM

## 2019-03-25 DIAGNOSIS — I25.10 ATHEROSCLEROTIC HEART DISEASE OF NATIVE CORONARY ARTERY WITHOUT ANGINA PECTORIS: ICD-10-CM

## 2019-03-25 DIAGNOSIS — M54.2 CERVICALGIA: ICD-10-CM

## 2019-03-25 DIAGNOSIS — E55.9 VITAMIN D DEFICIENCY, UNSPECIFIED: ICD-10-CM

## 2019-03-25 DIAGNOSIS — R42 DIZZINESS AND GIDDINESS: ICD-10-CM

## 2019-03-25 DIAGNOSIS — Z95.5 PRESENCE OF CORONARY ANGIOPLASTY IMPLANT AND GRAFT: ICD-10-CM
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
## 2019-03-25 DIAGNOSIS — I10 ESSENTIAL (PRIMARY) HYPERTENSION: ICD-10-CM

## 2019-03-25 DIAGNOSIS — R51 HEADACHE: ICD-10-CM

## 2019-03-25 DIAGNOSIS — Z79.02 LONG TERM (CURRENT) USE OF ANTITHROMBOTICS/ANTIPLATELETS: ICD-10-CM

## 2019-03-25 PROCEDURE — 99284 EMERGENCY DEPT VISIT MOD MDM: CPT

## 2019-03-25 PROCEDURE — 70450 CT HEAD/BRAIN W/O DYE: CPT | Mod: 26

## 2019-03-25 PROCEDURE — 93010 ELECTROCARDIOGRAM REPORT: CPT

## 2019-03-25 PROCEDURE — 72125 CT NECK SPINE W/O DYE: CPT | Mod: 26

## 2019-03-25 PROCEDURE — 76377 3D RENDER W/INTRP POSTPROCES: CPT | Mod: 26

## 2019-03-25 RX ORDER — IBUPROFEN 200 MG
1 TABLET ORAL
Qty: 15 | Refills: 0
Start: 2019-03-25 | End: 2019-03-29

## 2019-03-25 RX ORDER — LOSARTAN POTASSIUM 100 MG/1
1 TABLET, FILM COATED ORAL
Qty: 0 | Refills: 0 | COMMUNITY

## 2019-03-25 RX ORDER — OXYCODONE AND ACETAMINOPHEN 5; 325 MG/1; MG/1
1 TABLET ORAL ONCE
Qty: 0 | Refills: 0 | Status: DISCONTINUED | OUTPATIENT
Start: 2019-03-25 | End: 2019-03-25

## 2019-03-25 RX ORDER — FAMOTIDINE 10 MG/ML
1 INJECTION INTRAVENOUS
Qty: 0 | Refills: 0 | COMMUNITY

## 2019-03-25 RX ORDER — METHOCARBAMOL 500 MG/1
1 TABLET, FILM COATED ORAL
Qty: 15 | Refills: 0
Start: 2019-03-25 | End: 2019-03-29

## 2019-03-25 RX ADMIN — OXYCODONE AND ACETAMINOPHEN 1 TABLET(S): 5; 325 TABLET ORAL at 15:39

## 2019-03-25 NOTE — ED ADULT NURSE NOTE - NSIMPLEMENTINTERV_GEN_ALL_ED
Implemented All Universal Safety Interventions:  [place] to call system. Call bell, personal items and telephone within reach. Instruct patient to call for assistance. Room bathroom lighting operational. Non-slip footwear when patient is off stretcher. Physically safe environment: no spills, clutter or unnecessary equipment. Stretcher in lowest position, wheels locked, appropriate side rails in place.
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
## 2019-03-25 NOTE — ED PROVIDER NOTE - CONSTITUTIONAL, MLM
normal... Well appearing, well nourished, awake, alert, oriented to person, place, time/situation and in no apparent distress. Speaking in clear full sentences no nasal flaring no shoulders retractions not holding her head/chest/abdomen

## 2019-03-25 NOTE — ED PROVIDER NOTE - MUSCULOSKELETAL NECK EXAM
supple/trachea midline/no deformity, pain or tenderness. no restriction of movement/No vertebral point tenderness

## 2019-03-25 NOTE — ED PROVIDER NOTE - PROGRESS NOTE DETAILS
Pt is ambulating with normal gaits without assist dizzy sob chest pain. Pt has no focal neuro deficits on exam.  Pt ans  are given and explained all test reports and advised to follow up with Dr. Lamb spinal doctor as soon as possible.

## 2019-04-29 ENCOUNTER — MEDICATION RENEWAL (OUTPATIENT)
Age: 49
End: 2019-04-29

## 2019-04-30 ENCOUNTER — RX CHANGE (OUTPATIENT)
Age: 49
End: 2019-04-30

## 2019-05-29 ENCOUNTER — APPOINTMENT (OUTPATIENT)
Dept: INTERNAL MEDICINE | Facility: CLINIC | Age: 49
End: 2019-05-29
Payer: COMMERCIAL

## 2019-05-29 VITALS
WEIGHT: 175 LBS | OXYGEN SATURATION: 96 % | SYSTOLIC BLOOD PRESSURE: 130 MMHG | HEART RATE: 70 BPM | BODY MASS INDEX: 28.12 KG/M2 | HEIGHT: 66 IN | DIASTOLIC BLOOD PRESSURE: 84 MMHG

## 2019-05-29 DIAGNOSIS — Z09 ENCOUNTER FOR FOLLOW-UP EXAMINATION AFTER COMPLETED TREATMENT FOR CONDITIONS OTHER THAN MALIGNANT NEOPLASM: ICD-10-CM

## 2019-05-29 DIAGNOSIS — M79.18 MYALGIA, OTHER SITE: ICD-10-CM

## 2019-05-29 DIAGNOSIS — Z12.11 ENCOUNTER FOR SCREENING FOR MALIGNANT NEOPLASM OF COLON: ICD-10-CM

## 2019-05-29 DIAGNOSIS — Z12.31 ENCOUNTER FOR SCREENING MAMMOGRAM FOR MALIGNANT NEOPLASM OF BREAST: ICD-10-CM

## 2019-05-29 DIAGNOSIS — H91.91 UNSPECIFIED HEARING LOSS, RIGHT EAR: ICD-10-CM

## 2019-05-29 DIAGNOSIS — F17.200 NICOTINE DEPENDENCE, UNSPECIFIED, UNCOMPLICATED: ICD-10-CM

## 2019-05-29 PROCEDURE — 93000 ELECTROCARDIOGRAM COMPLETE: CPT

## 2019-05-29 PROCEDURE — 36415 COLL VENOUS BLD VENIPUNCTURE: CPT

## 2019-05-29 PROCEDURE — 99386 PREV VISIT NEW AGE 40-64: CPT | Mod: 25

## 2019-05-29 RX ORDER — KETOCONAZOLE 20 MG/G
2 CREAM TOPICAL
Qty: 60 | Refills: 0 | Status: DISCONTINUED | COMMUNITY
Start: 2018-03-27 | End: 2019-05-29

## 2019-05-29 RX ORDER — PREDNISONE 5 MG/1
5 TABLET ORAL DAILY
Qty: 30 | Refills: 6 | Status: DISCONTINUED | COMMUNITY
Start: 2018-03-15 | End: 2019-05-29

## 2019-05-29 RX ORDER — NICOTINE POLACRILEX 2 MG/1
2 LOZENGE ORAL
Qty: 672 | Refills: 0 | Status: DISCONTINUED | COMMUNITY
Start: 2017-12-29 | End: 2019-05-29

## 2019-05-29 RX ORDER — INSULIN ASPART 100 [IU]/ML
100 INJECTION, SOLUTION INTRAVENOUS; SUBCUTANEOUS
Qty: 1 | Refills: 3 | Status: DISCONTINUED | COMMUNITY
Start: 2017-12-29 | End: 2019-05-29
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
## 2019-05-29 NOTE — REVIEW OF SYSTEMS
[Joint Pain] : joint pain [Muscle Pain] : muscle pain [Negative] : Psychiatric [FreeTextEntry4] : decreased hearing right ear

## 2019-05-29 NOTE — PHYSICAL EXAM
[No Acute Distress] : no acute distress [Well Nourished] : well nourished [Well Developed] : well developed [Well-Appearing] : well-appearing [PERRL] : pupils equal round and reactive to light [Normal Sclera/Conjunctiva] : normal sclera/conjunctiva [Normal Outer Ear/Nose] : the outer ears and nose were normal in appearance [Normal Oropharynx] : the oropharynx was normal [Supple] : supple [No Respiratory Distress] : no respiratory distress  [Clear to Auscultation] : lungs were clear to auscultation bilaterally [Normal Rate] : normal rate  [Regular Rhythm] : with a regular rhythm [Normal S1, S2] : normal S1 and S2 [No Edema] : there was no peripheral edema [Soft] : abdomen soft [Non Tender] : non-tender [Non-distended] : non-distended [Normal Bowel Sounds] : normal bowel sounds [Normal Anterior Cervical Nodes] : no anterior cervical lymphadenopathy [No CVA Tenderness] : no CVA  tenderness [Grossly Normal Strength/Tone] : grossly normal strength/tone [Normal Gait] : normal gait [Coordination Grossly Intact] : coordination grossly intact [No Focal Deficits] : no focal deficits [Normal Affect] : the affect was normal [Alert and Oriented x3] : oriented to person, place, and time [Normal Insight/Judgement] : insight and judgment were intact

## 2019-05-29 NOTE — HEALTH RISK ASSESSMENT
[Patient reported PAP Smear was abnormal] : Patient reported PAP Smear was abnormal [HIV test declined] : HIV test declined [Hepatitis C test declined] : Hepatitis C test declined [None] : None [With Family] : lives with family [# Of Children ___] : has [unfilled] children [] :  [Unemployed] : unemployed [Feels Safe at Home] : Feels safe at home [Sexually Active] : sexually active [Fully functional (bathing, dressing, toileting, transferring, walking, feeding)] : Fully functional (bathing, dressing, toileting, transferring, walking, feeding) [Fully functional (using the telephone, shopping, preparing meals, housekeeping, doing laundry, using] : Fully functional and needs no help or supervision to perform IADLs (using the telephone, shopping, preparing meals, housekeeping, doing laundry, using transportation, managing medications and managing finances) [Reports changes in hearing] : Reports changes in hearing [Carbon Monoxide Detector] : carbon monoxide detector [Smoke Detector] : smoke detector [Seat Belt] :  uses seat belt [] : No [de-identified] : on/off- between not smoking and smoking 1 pack per week [de-identified] : social [Change in mental status noted] : No change in mental status noted [Language] : denies difficulty with language [Reports changes in vision] : Reports no changes in vision [Reports changes in dental health] : Reports no changes in dental health [MammogramComments] : 6 years ago- normal [PapSmearDate] : 09/17 [PapSmearComments] : s/p colposcopy with normal results 10/18 [de-identified] : right sided hearing decreased
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
## 2019-05-30 LAB
25(OH)D3 SERPL-MCNC: 36.1 NG/ML
ALBUMIN SERPL ELPH-MCNC: 4.2 G/DL
ALP BLD-CCNC: 94 U/L
ALT SERPL-CCNC: 22 U/L
ANION GAP SERPL CALC-SCNC: 13 MMOL/L
AST SERPL-CCNC: 19 U/L
BASOPHILS # BLD AUTO: 0.02 K/UL
BASOPHILS NFR BLD AUTO: 0.2 %
BILIRUB SERPL-MCNC: 0.2 MG/DL
BUN SERPL-MCNC: 9 MG/DL
CALCIUM SERPL-MCNC: 9.7 MG/DL
CHLORIDE SERPL-SCNC: 103 MMOL/L
CHOLEST SERPL-MCNC: 142 MG/DL
CHOLEST/HDLC SERPL: 3.5 RATIO
CO2 SERPL-SCNC: 24 MMOL/L
CREAT SERPL-MCNC: 0.56 MG/DL
CREAT SPEC-SCNC: 94 MG/DL
EOSINOPHIL # BLD AUTO: 0.33 K/UL
EOSINOPHIL NFR BLD AUTO: 3.1 %
ESTIMATED AVERAGE GLUCOSE: 186 MG/DL
GLUCOSE SERPL-MCNC: 96 MG/DL
HBA1C MFR BLD HPLC: 8.1 %
HCT VFR BLD CALC: 41.1 %
HDLC SERPL-MCNC: 41 MG/DL
HGB BLD-MCNC: 13.5 G/DL
IMM GRANULOCYTES NFR BLD AUTO: 0.3 %
LDLC SERPL CALC-MCNC: 69 MG/DL
LYMPHOCYTES # BLD AUTO: 2.54 K/UL
LYMPHOCYTES NFR BLD AUTO: 23.6 %
MAN DIFF?: NORMAL
MCHC RBC-ENTMCNC: 32.8 GM/DL
MCHC RBC-ENTMCNC: 33.3 PG
MCV RBC AUTO: 101.2 FL
MICROALBUMIN 24H UR DL<=1MG/L-MCNC: 1.5 MG/DL
MICROALBUMIN/CREAT 24H UR-RTO: 16 MG/G
MONOCYTES # BLD AUTO: 0.99 K/UL
MONOCYTES NFR BLD AUTO: 9.2 %
NEUTROPHILS # BLD AUTO: 6.83 K/UL
NEUTROPHILS NFR BLD AUTO: 63.6 %
PLATELET # BLD AUTO: 266 K/UL
POTASSIUM SERPL-SCNC: 4.3 MMOL/L
PROT SERPL-MCNC: 6.6 G/DL
RBC # BLD: 4.06 M/UL
RBC # FLD: 12.4 %
SODIUM SERPL-SCNC: 140 MMOL/L
TRIGL SERPL-MCNC: 159 MG/DL
TSH SERPL-ACNC: 1.28 UIU/ML
WBC # FLD AUTO: 10.74 K/UL

## 2019-07-01 ENCOUNTER — APPOINTMENT (OUTPATIENT)
Dept: CARDIOLOGY | Facility: CLINIC | Age: 49
End: 2019-07-01
Payer: COMMERCIAL

## 2019-07-01 VITALS
DIASTOLIC BLOOD PRESSURE: 72 MMHG | HEART RATE: 75 BPM | SYSTOLIC BLOOD PRESSURE: 142 MMHG | TEMPERATURE: 98.5 F | OXYGEN SATURATION: 100 %

## 2019-07-01 PROCEDURE — 93000 ELECTROCARDIOGRAM COMPLETE: CPT

## 2019-07-01 PROCEDURE — 99214 OFFICE O/P EST MOD 30 MIN: CPT

## 2019-07-05 ENCOUNTER — APPOINTMENT (OUTPATIENT)
Dept: INTERNAL MEDICINE | Facility: CLINIC | Age: 49
End: 2019-07-05
Payer: COMMERCIAL

## 2019-07-05 PROCEDURE — 36415 COLL VENOUS BLD VENIPUNCTURE: CPT

## 2019-07-08 LAB
BASOPHILS # BLD AUTO: 0.03 K/UL
BASOPHILS NFR BLD AUTO: 0.4 %
EOSINOPHIL # BLD AUTO: 0.14 K/UL
EOSINOPHIL NFR BLD AUTO: 1.7 %
HCT VFR BLD CALC: 38.9 %
HGB BLD-MCNC: 13 G/DL
IMM GRANULOCYTES NFR BLD AUTO: 0.2 %
LYMPHOCYTES # BLD AUTO: 1.92 K/UL
LYMPHOCYTES NFR BLD AUTO: 23 %
MAN DIFF?: NORMAL
MCHC RBC-ENTMCNC: 33.1 PG
MCHC RBC-ENTMCNC: 33.4 GM/DL
MCV RBC AUTO: 99 FL
MONOCYTES # BLD AUTO: 0.68 K/UL
MONOCYTES NFR BLD AUTO: 8.1 %
NEUTROPHILS # BLD AUTO: 5.57 K/UL
NEUTROPHILS NFR BLD AUTO: 66.6 %
PLATELET # BLD AUTO: 276 K/UL
RBC # BLD: 3.93 M/UL
RBC # FLD: 12.4 %
WBC # FLD AUTO: 8.36 K/UL

## 2019-07-15 ENCOUNTER — APPOINTMENT (OUTPATIENT)
Dept: OBGYN | Facility: CLINIC | Age: 49
End: 2019-07-15
Payer: COMMERCIAL

## 2019-07-15 ENCOUNTER — RX RENEWAL (OUTPATIENT)
Age: 49
End: 2019-07-15

## 2019-07-15 VITALS — BODY MASS INDEX: 28.25 KG/M2 | WEIGHT: 175 LBS | SYSTOLIC BLOOD PRESSURE: 153 MMHG | DIASTOLIC BLOOD PRESSURE: 74 MMHG

## 2019-07-15 DIAGNOSIS — Z01.419 ENCOUNTER FOR GYNECOLOGICAL EXAMINATION (GENERAL) (ROUTINE) W/OUT ABNORMAL FINDINGS: ICD-10-CM

## 2019-07-15 DIAGNOSIS — F17.200 NICOTINE DEPENDENCE, UNSPECIFIED, UNCOMPLICATED: ICD-10-CM

## 2019-07-15 DIAGNOSIS — Z78.9 OTHER SPECIFIED HEALTH STATUS: ICD-10-CM

## 2019-07-15 PROCEDURE — 99396 PREV VISIT EST AGE 40-64: CPT

## 2019-07-15 RX ORDER — CLOTRIMAZOLE AND BETAMETHASONE DIPROPIONATE 10; .5 MG/G; MG/G
1-0.05 CREAM TOPICAL TWICE DAILY
Qty: 1 | Refills: 2 | Status: COMPLETED | COMMUNITY
Start: 2017-07-11 | End: 2019-07-15

## 2019-07-15 NOTE — CHIEF COMPLAINT
[Initial Visit] : initial GYN visit [FreeTextEntry1] : Pt presents for annual.  Pt c/o vaginal odor x 2-3 months.  Had cervical cancer in 2017 and was treated with cone bx\par Denies discharge or itching.

## 2019-07-15 NOTE — HISTORY OF PRESENT ILLNESS
[___ Year(s) Ago] : [unfilled] year(s) ago [Good] : being in good health [Last Mammogram ___] : Last Mammogram was [unfilled] [Last Bone Density ___] : Last bone density studies [unfilled] [Last Colonoscopy ___] : Last colonoscopy [unfilled] [Last Pap ___] : Last cervical pap smear was [unfilled]

## 2019-07-22 LAB
CANDIDA VAG CYTO: NOT DETECTED
CYTOLOGY CVX/VAG DOC THIN PREP: ABNORMAL
G VAGINALIS+PREV SP MTYP VAG QL MICRO: NOT DETECTED
HPV HIGH+LOW RISK DNA PNL CVX: DETECTED
T VAGINALIS VAG QL WET PREP: NOT DETECTED

## 2019-07-31 ENCOUNTER — APPOINTMENT (OUTPATIENT)
Dept: RHEUMATOLOGY | Facility: CLINIC | Age: 49
End: 2019-07-31
Payer: COMMERCIAL

## 2019-07-31 VITALS
SYSTOLIC BLOOD PRESSURE: 120 MMHG | BODY MASS INDEX: 27.64 KG/M2 | WEIGHT: 172 LBS | HEART RATE: 77 BPM | DIASTOLIC BLOOD PRESSURE: 70 MMHG | HEIGHT: 66 IN

## 2019-07-31 DIAGNOSIS — Z82.61 FAMILY HISTORY OF ARTHRITIS: ICD-10-CM

## 2019-07-31 PROCEDURE — 36415 COLL VENOUS BLD VENIPUNCTURE: CPT

## 2019-07-31 PROCEDURE — 99205 OFFICE O/P NEW HI 60 MIN: CPT | Mod: 25

## 2019-07-31 NOTE — HISTORY OF PRESENT ILLNESS
[Arthralgias] : arthralgias [Joint Swelling] : joint swelling [Morning Stiffness] : morning stiffness [FreeTextEntry1] : 49 year old female with PMHx as listed below reports that she has been experiencing joint pains, especially in her hands (primarily in her PIP's), shoulders, knees, and lower back for the past 2 years.  The pain is intermittent, typically worse with activity, adrian when first starting an activity after prolonged rest.  She describes the pain as dull and throbbing, 5-6 out of 10.  (+)persistent swelling in her hands.  (+)AM stiffness, usually lasting about 10-15 minutes.  She gets some relief from Advil or Aleve and warm water.  No other known alleviating factors.\par No F/C, no unintentional weight loss, no night sweats, no oral ulcers, no rashes, (+) alopecia, no photosensitivity, no dry eyes/dry mouth, no Raynaud symptoms, no focal weakness, no dysphagia  [Anorexia] : no anorexia [Malaise] : no malaise [Weight Loss] : no weight loss [Fever] : no fever [Chills] : no chills [Fatigue] : no fatigue [Skin Nodules] : no skin nodules [Malar Facial Rash] : no malar facial rash [Skin Lesions] : no lesions [Cough] : no cough [Oral Ulcers] : no oral ulcers [Dysphagia] : no dysphagia [Dry Mouth] : no dry mouth [Chest Pain] : no chest pain [Shortness of Breath] : no shortness of breath [Joint Warmth] : no joint warmth [Joint Deformity] : no joint deformity [Falls] : no falls [Decreased ROM] : no decreased range of motion [Myalgias] : no myalgias [Dyspnea] : no dyspnea [Muscle Weakness] : no muscle weakness [Muscle Spasms] : no muscle spasms [Muscle Cramping] : no muscle cramping [Visual Changes] : no visual changes [Eye Redness] : no eye redness [Eye Pain] : no eye pain [Dry Eyes] : no dry eyes

## 2019-07-31 NOTE — CONSULT LETTER
[Dear  ___] : Dear  [unfilled], [Consult Letter:] : I had the pleasure of evaluating your patient, [unfilled]. [Please see my note below.] : Please see my note below. [Consult Closing:] : Thank you very much for allowing me to participate in the care of this patient.  If you have any questions, please do not hesitate to contact me. [Sincerely,] : Sincerely, [FreeTextEntry3] : Kei Kearney MD\par Rheumatology\par [facility]\par  of Medicine\par [employer] [facility] \par \par [address]\[place] [address]\par phone:  [phone]\par fax:      [phone]\par \par [address] [place] [address]\[place] [address]\par phone:  [phone]\par fax:      [phone]\par

## 2019-07-31 NOTE — ASSESSMENT
[FreeTextEntry1] : 49 year old female presents with polyarticular arthritisand low back pain.  Her presentation is most suggestive of osteoarthritis, though the swelling in her hands is atypical.  I have therefore ordered some bloodwork and x-rays to rule out other possible etiologies, such as a concurrent inflammatory arthritis.  She will follow up with me again in 2-3 weeks to review her results.  In the meantime, I have recommended conservative treatment, including exercises, ibuprofen and/or Tylenol prn, warm compresses, topical analgesics, and weight loss.\par

## 2019-07-31 NOTE — PHYSICAL EXAM
[General Appearance - Alert] : alert [General Appearance - In No Acute Distress] : in no acute distress [Sclera] : the sclera and conjunctiva were normal [Outer Ear] : the ears and nose were normal in appearance [Oropharynx] : the oropharynx was normal [Neck Appearance] : the appearance of the neck was normal [Jugular Venous Distention Increased] : there was no jugular-venous distention [Neck Cervical Mass (___cm)] : no neck mass was observed [Thyroid Diffuse Enlargement] : the thyroid was not enlarged [Thyroid Nodule] : there were no palpable thyroid nodules [Auscultation Breath Sounds / Voice Sounds] : lungs were clear to auscultation bilaterally [Heart Rate And Rhythm] : heart rate was normal and rhythm regular [Heart Sounds] : normal S1 and S2 [Heart Sounds Gallop] : no gallops [Murmurs] : no murmurs [Heart Sounds Pericardial Friction Rub] : no pericardial rub [Edema] : there was no peripheral edema [Bowel Sounds] : normal bowel sounds [Abdomen Soft] : soft [Abdomen Tenderness] : non-tender [Abdomen Mass (___ Cm)] : no abdominal mass palpated [Cervical Lymph Nodes Enlarged Posterior Bilaterally] : posterior cervical [Cervical Lymph Nodes Enlarged Anterior Bilaterally] : anterior cervical [No Spinal Tenderness] : no spinal tenderness [Supraclavicular Lymph Nodes Enlarged Bilaterally] : supraclavicular [FreeTextEntry1] : No synovitis;  (+)diffuse swelling throughout B/L hands, worst in the fingers;  (+)tenderness in B/L 2nd-5th PIP's;  right knee w/ pain upon flexion;  B/L knees w/ (+)crepitus [Skin Turgor] : normal skin turgor [Skin Color & Pigmentation] : normal skin color and pigmentation [] : no rash [No Focal Deficits] : no focal deficits [Oriented To Time, Place, And Person] : oriented to person, place, and time [Affect] : the affect was normal [Impaired Insight] : insight and judgment were intact

## 2019-08-01 LAB
ALBUMIN SERPL ELPH-MCNC: 4.2 G/DL
ALP BLD-CCNC: 105 U/L
ALT SERPL-CCNC: 24 U/L
ANION GAP SERPL CALC-SCNC: 15 MMOL/L
AST SERPL-CCNC: 18 U/L
BASOPHILS # BLD AUTO: 0.02 K/UL
BASOPHILS NFR BLD AUTO: 0.2 %
BILIRUB SERPL-MCNC: 0.4 MG/DL
BUN SERPL-MCNC: 11 MG/DL
CALCIUM SERPL-MCNC: 9.4 MG/DL
CCP AB SER IA-ACNC: <8 UNITS
CHLORIDE SERPL-SCNC: 104 MMOL/L
CO2 SERPL-SCNC: 24 MMOL/L
CREAT SERPL-MCNC: 0.6 MG/DL
CRP SERPL-MCNC: 0.39 MG/DL
EOSINOPHIL # BLD AUTO: 0.13 K/UL
EOSINOPHIL NFR BLD AUTO: 1.6 %
ERYTHROCYTE [SEDIMENTATION RATE] IN BLOOD BY WESTERGREN METHOD: 6 MM/HR
GLUCOSE SERPL-MCNC: 207 MG/DL
HCT VFR BLD CALC: 41.7 %
HGB BLD-MCNC: 13.8 G/DL
IMM GRANULOCYTES NFR BLD AUTO: 0.2 %
LYMPHOCYTES # BLD AUTO: 1.77 K/UL
LYMPHOCYTES NFR BLD AUTO: 21.7 %
MAN DIFF?: NORMAL
MCHC RBC-ENTMCNC: 33.1 GM/DL
MCHC RBC-ENTMCNC: 33.1 PG
MCV RBC AUTO: 100 FL
MONOCYTES # BLD AUTO: 0.73 K/UL
MONOCYTES NFR BLD AUTO: 8.9 %
NEUTROPHILS # BLD AUTO: 5.5 K/UL
NEUTROPHILS NFR BLD AUTO: 67.4 %
PLATELET # BLD AUTO: 265 K/UL
POTASSIUM SERPL-SCNC: 4.4 MMOL/L
PROT SERPL-MCNC: 6.5 G/DL
RBC # BLD: 4.17 M/UL
RBC # FLD: 12.4 %
RF+CCP IGG SER-IMP: NEGATIVE
RHEUMATOID FACT SER QL: <10 IU/ML
SODIUM SERPL-SCNC: 142 MMOL/L
THYROGLOB AB SERPL-ACNC: 280 IU/ML
THYROPEROXIDASE AB SERPL IA-ACNC: 535 IU/ML
WBC # FLD AUTO: 8.17 K/UL

## 2019-08-02 ENCOUNTER — APPOINTMENT (OUTPATIENT)
Dept: OBGYN | Facility: CLINIC | Age: 49
End: 2019-08-02

## 2019-08-02 LAB
ANA SER IF-ACNC: NEGATIVE
CENTROMERE IGG SER-ACNC: <0.2 CD:130001892
ENA SCL70 IGG SER IA-ACNC: 0.3 AL
ENA SS-A AB SER IA-ACNC: <0.2 AL
ENA SS-B AB SER IA-ACNC: <0.2 AL

## 2019-08-13 ENCOUNTER — MEDICATION RENEWAL (OUTPATIENT)
Age: 49
End: 2019-08-13

## 2019-08-14 ENCOUNTER — APPOINTMENT (OUTPATIENT)
Dept: CV DIAGNOSTICS | Facility: HOSPITAL | Age: 49
End: 2019-08-14

## 2019-08-15 LAB — RNA POLYMERASE III IGG: <10 U

## 2019-08-22 ENCOUNTER — OTHER (OUTPATIENT)
Age: 49
End: 2019-08-22

## 2019-08-30 ENCOUNTER — APPOINTMENT (OUTPATIENT)
Dept: OTOLARYNGOLOGY | Facility: CLINIC | Age: 49
End: 2019-08-30
Payer: COMMERCIAL

## 2019-08-30 VITALS
BODY MASS INDEX: 28.12 KG/M2 | HEIGHT: 66 IN | DIASTOLIC BLOOD PRESSURE: 71 MMHG | SYSTOLIC BLOOD PRESSURE: 108 MMHG | WEIGHT: 175 LBS | HEART RATE: 71 BPM

## 2019-08-30 DIAGNOSIS — J31.0 CHRONIC RHINITIS: ICD-10-CM

## 2019-08-30 DIAGNOSIS — R09.82 POSTNASAL DRIP: ICD-10-CM

## 2019-08-30 DIAGNOSIS — H90.3 SENSORINEURAL HEARING LOSS, BILATERAL: ICD-10-CM

## 2019-08-30 PROCEDURE — 99204 OFFICE O/P NEW MOD 45 MIN: CPT | Mod: 25

## 2019-08-30 PROCEDURE — 92567 TYMPANOMETRY: CPT

## 2019-08-30 PROCEDURE — 92557 COMPREHENSIVE HEARING TEST: CPT

## 2019-08-30 PROCEDURE — 31231 NASAL ENDOSCOPY DX: CPT

## 2019-08-30 RX ORDER — MAGNESIUM OXIDE 241.3 MG/1000MG
400 TABLET ORAL
Refills: 0 | Status: DISCONTINUED | COMMUNITY
Start: 2019-07-31 | End: 2019-08-30

## 2019-08-30 NOTE — REVIEW OF SYSTEMS
[Ear Pain] : ear pain [Hearing Loss] : hearing loss [As Noted in HPI] : as noted in HPI [Nasal Congestion] : nasal congestion [Negative] : Endocrine

## 2019-08-30 NOTE — PROCEDURE
[FreeTextEntry3] : Procedure note: Nasal endoscopy\par \par Aug 30, 2019 \par \par Description of Procedure:  Nasal endoscopy was performed because of recalcitrant symptoms of nasal obstruction and/or chronic rhinitis, and anterior anatomic abnormalities precluding visualization. Topical decongestant and/or anesthetic was administered to the nasal cavity.  Using a flexible endoscope with sheath, the nasal mucosa, septum, turbinates, and ostiomeatal complex were examined.  \par \par Nasal mucosa findings:  the nasal mucosa was mildly edematous with thin secretions.\par Septum findings:  the septum showed no abnormalities.\par Nasopharynx findings:  the nasopharynx was normal.\par Middle meatus findings:  the middle meatus had no abnormalities.\par Sinuses findings:  the paranasal sinuses had no abnormalities.\par \par posterior oropharyngeal cobblestoning, b/l vc mobile without lesions\par

## 2019-08-30 NOTE — REASON FOR VISIT
[Initial Evaluation] : an initial evaluation for [Other: _____] : [unfilled] [FreeTextEntry2] : clogged ears, dizziness and itchy throat

## 2019-08-30 NOTE — HISTORY OF PRESENT ILLNESS
[de-identified] : 49 year old female presents for bilateral clogged ears, Right ear is worse than the left, for the past year. Reports intermittent bilateral mild otalgia and fluctuating ear pressure. Reports suspected hearing loss. Reports episodes of vertigo, usually a few days a week, lasting a few minutes to a few days straight. Treated with meclizine previously without relief.  Denies otorrhea, tinnitus.  Reports itchy throat,hoarseness and a dry throat for the past year. Denies dysphagia, dyspnea or throat infections. Reports intermittent nasal congestion, with mild sinus pressure treated with Zyrtec without relief. Denies post nasal drip or nasal discharge. \par \par

## 2019-09-06 ENCOUNTER — APPOINTMENT (OUTPATIENT)
Dept: OBGYN | Facility: CLINIC | Age: 49
End: 2019-09-06
Payer: COMMERCIAL

## 2019-09-06 DIAGNOSIS — R87.612 LOW GRADE SQUAMOUS INTRAEPITHELIAL LESION ON CYTOLOGIC SMEAR OF CERVIX (LGSIL): ICD-10-CM

## 2019-09-06 PROCEDURE — 57454 BX/CURETT OF CERVIX W/SCOPE: CPT

## 2019-09-06 PROCEDURE — 81025 URINE PREGNANCY TEST: CPT

## 2019-09-06 NOTE — PROCEDURE
[Colposcopy] : colposcopy [HPV high risk] : PCR positive for high risk HPV [LGSIL] : low grade squamous intraepithelial lesion [Consent Obtained] : written consent was obtained prior to the procedure [Pap Performed] : a cervical Pap smear was performed [No Abnormalities] : no abnormalities [Acetowhite ___ o'clock] : ascetowhite changes at [unfilled] ~Uo'clock [Biopsies Taken: # ___] : [unfilled] biopsies taken of the cervix [Biopsy Locations ___ o'clock] : the biopsies were taken at [unfilled] o'clock [ECC Done] : Endocervical curettage was performed.  [Tolerated Well] : the patient tolerated the procedure well [Antonio's] : Antonio's solution [No Complications] : there were no complications

## 2019-09-09 LAB — HCG UR QL: NEGATIVE

## 2019-09-10 ENCOUNTER — MEDICATION RENEWAL (OUTPATIENT)
Age: 49
End: 2019-09-10

## 2019-09-13 NOTE — HISTORY OF PRESENT ILLNESS
[FreeTextEntry1] : 49 year old woman with Type 1 DM and distant history of MI, stent and MR.  Currently stable as far a s ischemic symptoms.  She denies chest pain, dyspnea on exertion.

## 2019-09-13 NOTE — REASON FOR VISIT
[Coronary Artery Disease] : coronary artery disease [Follow-Up - Clinic] : a clinic follow-up of [Mitral Regurgitation] : mitral regurgitation

## 2019-09-13 NOTE — PHYSICAL EXAM
[General Appearance - Well Developed] : well developed [Normal Appearance] : normal appearance [Well Groomed] : well groomed [General Appearance - Well Nourished] : well nourished [General Appearance - In No Acute Distress] : no acute distress [No Deformities] : no deformities [Normal Conjunctiva] : the conjunctiva exhibited no abnormalities [Normal Oral Mucosa] : normal oral mucosa [Eyelids - No Xanthelasma] : the eyelids demonstrated no xanthelasmas [No Oral Cyanosis] : no oral cyanosis [No Oral Pallor] : no oral pallor [Normal Jugular Venous A Waves Present] : normal jugular venous A waves present [No Jugular Venous Dean A Waves] : no jugular venous dean A waves [Normal Jugular Venous V Waves Present] : normal jugular venous V waves present [Respiration, Rhythm And Depth] : normal respiratory rhythm and effort [Exaggerated Use Of Accessory Muscles For Inspiration] : no accessory muscle use [Auscultation Breath Sounds / Voice Sounds] : lungs were clear to auscultation bilaterally [Heart Sounds] : normal S1 and S2 [Heart Rate And Rhythm] : heart rate and rhythm were normal [Systolic grade ___/6] : A grade [unfilled]/6 systolic murmur was heard. [Abdomen Soft] : soft [Abdomen Tenderness] : non-tender [Abdomen Mass (___ Cm)] : no abdominal mass palpated [Abnormal Walk] : normal gait [Gait - Sufficient For Exercise Testing] : the gait was sufficient for exercise testing [Nail Clubbing] : no clubbing of the fingernails [Petechial Hemorrhages (___cm)] : no petechial hemorrhages [Cyanosis, Localized] : no localized cyanosis [] : no rash [Skin Color & Pigmentation] : normal skin color and pigmentation [No Venous Stasis] : no venous stasis [Skin Lesions] : no skin lesions [No Skin Ulcers] : no skin ulcer [No Xanthoma] : no  xanthoma was observed [Oriented To Time, Place, And Person] : oriented to person, place, and time [Affect] : the affect was normal [No Anxiety] : not feeling anxious [Mood] : the mood was normal

## 2019-09-13 NOTE — DISCUSSION/SUMMARY
[FreeTextEntry1] : 49 year old diabetic with early onset CAD.  Currently stable with good lipid control.

## 2019-09-16 LAB — ACTINOMYCES CULTURE FOR IUD: NORMAL

## 2019-09-17 ENCOUNTER — OTHER (OUTPATIENT)
Age: 49
End: 2019-09-17

## 2019-09-19 LAB — CORE LAB BIOPSY: NORMAL

## 2019-10-11 ENCOUNTER — APPOINTMENT (OUTPATIENT)
Dept: RHEUMATOLOGY | Facility: CLINIC | Age: 49
End: 2019-10-11
Payer: MEDICAID

## 2019-10-11 VITALS
HEIGHT: 66 IN | DIASTOLIC BLOOD PRESSURE: 72 MMHG | BODY MASS INDEX: 29.25 KG/M2 | SYSTOLIC BLOOD PRESSURE: 108 MMHG | WEIGHT: 182 LBS

## 2019-10-11 DIAGNOSIS — M13.0 POLYARTHRITIS, UNSPECIFIED: ICD-10-CM

## 2019-10-11 DIAGNOSIS — M17.0 BILATERAL PRIMARY OSTEOARTHRITIS OF KNEE: ICD-10-CM

## 2019-10-11 DIAGNOSIS — M79.89 OTHER SPECIFIED SOFT TISSUE DISORDERS: ICD-10-CM

## 2019-10-11 DIAGNOSIS — H04.123 DRY EYE SYNDROME OF BILATERAL LACRIMAL GLANDS: ICD-10-CM

## 2019-10-11 DIAGNOSIS — M54.5 LOW BACK PAIN: ICD-10-CM

## 2019-10-11 PROCEDURE — 99214 OFFICE O/P EST MOD 30 MIN: CPT

## 2019-10-11 NOTE — HISTORY OF PRESENT ILLNESS
[Arthralgias] : arthralgias [Joint Swelling] : joint swelling [Morning Stiffness] : morning stiffness [FreeTextEntry1] : Initial visit (7/31/19):\par 49 year old female with PMHx as listed below reports that she has been experiencing joint pains, especially in her hands (primarily in her PIP's), shoulders, knees, and lower back for the past 2 years.  The pain is intermittent, typically worse with activity, adrian when first starting an activity after prolonged rest.  She describes the pain as dull and throbbing, 5-6 out of 10.  (+)persistent swelling in her hands.  (+)AM stiffness, usually lasting about 10-15 minutes.  She gets some relief from Advil or Aleve and warm water.  No other known alleviating factors.\par No F/C, no unintentional weight loss, no night sweats, no oral ulcers, no rashes, (+) alopecia, no photosensitivity, no dry eyes/dry mouth, no Raynaud symptoms, no focal weakness, no dysphagia  [Anorexia] : no anorexia [Weight Loss] : no weight loss [Malaise] : no malaise [Fever] : no fever [Chills] : no chills [Fatigue] : no fatigue [Skin Lesions] : no lesions [Malar Facial Rash] : no malar facial rash [Cough] : no cough [Skin Nodules] : no skin nodules [Oral Ulcers] : no oral ulcers [Dry Mouth] : no dry mouth [Dysphagia] : no dysphagia [Shortness of Breath] : no shortness of breath [Joint Warmth] : no joint warmth [Chest Pain] : no chest pain [Joint Deformity] : no joint deformity [Decreased ROM] : no decreased range of motion [Falls] : no falls [Dyspnea] : no dyspnea [Myalgias] : no myalgias [Muscle Weakness] : no muscle weakness [Muscle Spasms] : no muscle spasms [Muscle Cramping] : no muscle cramping [Visual Changes] : no visual changes [Eye Redness] : no eye redness [Eye Pain] : no eye pain [Dry Eyes] : no dry eyes

## 2019-10-11 NOTE — PHYSICAL EXAM
[General Appearance - Alert] : alert [General Appearance - In No Acute Distress] : in no acute distress [Sclera] : the sclera and conjunctiva were normal [Outer Ear] : the ears and nose were normal in appearance [Oropharynx] : the oropharynx was normal [Neck Appearance] : the appearance of the neck was normal [Jugular Venous Distention Increased] : there was no jugular-venous distention [Neck Cervical Mass (___cm)] : no neck mass was observed [Thyroid Nodule] : there were no palpable thyroid nodules [Thyroid Diffuse Enlargement] : the thyroid was not enlarged [Auscultation Breath Sounds / Voice Sounds] : lungs were clear to auscultation bilaterally [Heart Rate And Rhythm] : heart rate was normal and rhythm regular [Heart Sounds] : normal S1 and S2 [Heart Sounds Gallop] : no gallops [Murmurs] : no murmurs [Edema] : there was no peripheral edema [Heart Sounds Pericardial Friction Rub] : no pericardial rub [Abdomen Soft] : soft [Bowel Sounds] : normal bowel sounds [Abdomen Tenderness] : non-tender [Abdomen Mass (___ Cm)] : no abdominal mass palpated [Cervical Lymph Nodes Enlarged Posterior Bilaterally] : posterior cervical [Supraclavicular Lymph Nodes Enlarged Bilaterally] : supraclavicular [Cervical Lymph Nodes Enlarged Anterior Bilaterally] : anterior cervical [No Spinal Tenderness] : no spinal tenderness [] : no rash [Skin Color & Pigmentation] : normal skin color and pigmentation [Skin Turgor] : normal skin turgor [Oriented To Time, Place, And Person] : oriented to person, place, and time [No Focal Deficits] : no focal deficits [Affect] : the affect was normal [Impaired Insight] : insight and judgment were intact [FreeTextEntry1] : No synovitis;  (+)diffuse swelling throughout B/L hands, worst in the fingers;  (+)tenderness in B/L 2nd-5th PIP's;  right knee w/ pain upon flexion;  B/L knees w/ (+)crepitus

## 2019-10-11 NOTE — ASSESSMENT
[FreeTextEntry1] : 49 year old female presents with polyarticular arthritis and low back pain, likely due to OA.  Pt also w/ swelling in her hands, which has improved w/ cooler/dryer weather. w/u for underlying CTD (such as RA vs scleroderma) negative   Also w/ left shoulder pain, reportedly due to a RTC tear.  Pt also w/ autoimmune thyroiditis.\par   - Reiterated importance of exercise\par   - ibuprofen and/or Tylenol prn\par   - warm compresses\par   - OTC topical analgesics\par   - Cont PT for left shoulder.\par   - Will re-evaluate hand swelling at next visit, as weather will continue to turn colder.\par   - Pt on levothyroxine.

## 2019-10-22 ENCOUNTER — APPOINTMENT (OUTPATIENT)
Dept: ENDOCRINOLOGY | Facility: CLINIC | Age: 49
End: 2019-10-22
Payer: MEDICAID

## 2019-10-22 VITALS
SYSTOLIC BLOOD PRESSURE: 124 MMHG | DIASTOLIC BLOOD PRESSURE: 70 MMHG | OXYGEN SATURATION: 97 % | HEIGHT: 66 IN | HEART RATE: 70 BPM | BODY MASS INDEX: 28.93 KG/M2 | WEIGHT: 180 LBS

## 2019-10-22 PROCEDURE — 99214 OFFICE O/P EST MOD 30 MIN: CPT | Mod: 25

## 2019-10-22 PROCEDURE — 82962 GLUCOSE BLOOD TEST: CPT

## 2019-10-22 PROCEDURE — 36415 COLL VENOUS BLD VENIPUNCTURE: CPT

## 2019-10-22 PROCEDURE — 83036 HEMOGLOBIN GLYCOSYLATED A1C: CPT | Mod: QW

## 2019-10-29 LAB
25(OH)D3 SERPL-MCNC: 42.8 NG/ML
ALBUMIN SERPL ELPH-MCNC: 4.8 G/DL
ALP BLD-CCNC: 107 U/L
ALT SERPL-CCNC: 25 U/L
ANION GAP SERPL CALC-SCNC: 15 MMOL/L
AST SERPL-CCNC: 24 U/L
BILIRUB SERPL-MCNC: 0.2 MG/DL
BUN SERPL-MCNC: 10 MG/DL
CALCIUM SERPL-MCNC: 10.3 MG/DL
CHLORIDE SERPL-SCNC: 101 MMOL/L
CHOLEST SERPL-MCNC: 194 MG/DL
CHOLEST/HDLC SERPL: 3.5 RATIO
CK SERPL-CCNC: 195 U/L
CO2 SERPL-SCNC: 28 MMOL/L
CREAT SERPL-MCNC: 0.52 MG/DL
CREAT SPEC-SCNC: 33 MG/DL
ESTIMATED AVERAGE GLUCOSE: 177 MG/DL
GLUCOSE BLDC GLUCOMTR-MCNC: 80
GLUCOSE SERPL-MCNC: 40 MG/DL
HBA1C MFR BLD HPLC: 7.7
HBA1C MFR BLD HPLC: 7.8 %
HDLC SERPL-MCNC: 56 MG/DL
IGA SER QL IEP: 308 MG/DL
LDLC SERPL CALC-MCNC: 105 MG/DL
MICROALBUMIN 24H UR DL<=1MG/L-MCNC: 1.2 MG/DL
MICROALBUMIN/CREAT 24H UR-RTO: 36 MG/G
POTASSIUM SERPL-SCNC: 4.3 MMOL/L
PROT SERPL-MCNC: 7.2 G/DL
SODIUM SERPL-SCNC: 144 MMOL/L
T4 FREE SERPL-MCNC: 1.2 NG/DL
TRIGL SERPL-MCNC: 166 MG/DL
TSH SERPL-ACNC: 5.78 UIU/ML
TTG IGA SER IA-ACNC: <1.2 U/ML
TTG IGA SER-ACNC: NEGATIVE
TTG IGG SER IA-ACNC: 1.9 U/ML
TTG IGG SER IA-ACNC: NEGATIVE

## 2019-11-20 ENCOUNTER — OTHER (OUTPATIENT)
Age: 49
End: 2019-11-20

## 2019-11-26 ENCOUNTER — RX RENEWAL (OUTPATIENT)
Age: 49
End: 2019-11-26

## 2019-12-03 ENCOUNTER — RX RENEWAL (OUTPATIENT)
Age: 49
End: 2019-12-03

## 2019-12-03 NOTE — HISTORY OF PRESENT ILLNESS
[FreeTextEntry1] : 47 yo F with PMH DM1, HTN, HLD, CAD post stenting x 2 , CIN1, current smoker, Hashimoto's thyroiditis.\par \par DM1 was diagnosed with she was 7 years old\par Microvascular complications: denies neuropathy or retinopathy\par Last ophthalmologist visit : 2 years ago - has an upcoming appointment\par Last podiatrist visit: 3/2018\par She is on toujeo 50 u QHS and humalog 10-15 u tidac.\par She was on prednisone for stiffness of the joints- now discontinued\par Please refer to scanned glucose log. she tests glucose 4 times daily\par Breakfast: coffee\par L;1/2 SW + diet coke\par D; fish 1/4 plate of potato  +veg\par placed on OCPs at 39 years old. Mirena placed 7/2017\par last ophthalmologist visit  1 year ago\par last podiatrist visit 1 year ago\par \par

## 2019-12-03 NOTE — ASSESSMENT
[FreeTextEntry1] : 49 yo F with PMH DM1, HTN, HLD, CAD post stenting x 2 , CIN1, current smoker, Hashimoto's thyroiditis.\par \par 1. DM1 - will recommend lantus 48 u qhs and humalog 15 u tiadc. \par \par 2. HTN - continue amlodipine,  enalapril, metoprolol\par \par 3. HLD - continue lipitor\par \par 4. Hashimoto's thyroiditis - check TSH, FT4. continue recently decreased LT4 112 mcg qd \par \par 5. Nicotene dependence - cessation advised. .\par \par RV 3 months

## 2019-12-23 ENCOUNTER — RX RENEWAL (OUTPATIENT)
Age: 49
End: 2019-12-23

## 2019-12-30 ENCOUNTER — RX RENEWAL (OUTPATIENT)
Age: 49
End: 2019-12-30

## 2019-12-31 ENCOUNTER — RESULT REVIEW (OUTPATIENT)
Age: 49
End: 2019-12-31

## 2020-01-05 NOTE — ED PROVIDER NOTE - CROS ED ENMT ALL NEG
Pt arrived ambulatory to [facility] for IVIG infusion. PIV placed, flushed easily, rupali briskly. IVIG infusion started at 30 ml/hr, increased by 30 ml/hr every 30 minutes to a maximum rate of 120 ml/hr, per patient's preference. Pt tolerated infusion well.  Pt rested in [facility] after her infusion per her request. PIV flushed with NS, then d/c'd. Catheter tip remained intact. Gauze and coban to site. Pt left [facility] in NAD, aware of next appointment time.  
negative...

## 2020-01-14 ENCOUNTER — APPOINTMENT (OUTPATIENT)
Dept: RHEUMATOLOGY | Facility: CLINIC | Age: 50
End: 2020-01-14

## 2020-02-14 ENCOUNTER — RX RENEWAL (OUTPATIENT)
Age: 50
End: 2020-02-14

## 2020-03-26 ENCOUNTER — APPOINTMENT (OUTPATIENT)
Dept: CARDIOLOGY | Facility: CLINIC | Age: 50
End: 2020-03-26

## 2020-04-13 ENCOUNTER — NON-APPOINTMENT (OUTPATIENT)
Age: 50
End: 2020-04-13

## 2020-04-13 ENCOUNTER — APPOINTMENT (OUTPATIENT)
Dept: CARDIOLOGY | Facility: CLINIC | Age: 50
End: 2020-04-13
Payer: COMMERCIAL

## 2020-04-13 VITALS
SYSTOLIC BLOOD PRESSURE: 120 MMHG | WEIGHT: 180 LBS | HEART RATE: 70 BPM | BODY MASS INDEX: 28.93 KG/M2 | HEIGHT: 66 IN | DIASTOLIC BLOOD PRESSURE: 78 MMHG

## 2020-04-13 PROCEDURE — 99215 OFFICE O/P EST HI 40 MIN: CPT

## 2020-04-13 PROCEDURE — 93000 ELECTROCARDIOGRAM COMPLETE: CPT

## 2020-04-13 NOTE — PHYSICAL EXAM
[General Appearance - Well Developed] : well developed [Normal Appearance] : normal appearance [Well Groomed] : well groomed [General Appearance - Well Nourished] : well nourished [No Deformities] : no deformities [General Appearance - In No Acute Distress] : no acute distress [Normal Conjunctiva] : the conjunctiva exhibited no abnormalities [Eyelids - No Xanthelasma] : the eyelids demonstrated no xanthelasmas [Normal Oral Mucosa] : normal oral mucosa [No Oral Pallor] : no oral pallor [No Oral Cyanosis] : no oral cyanosis [Normal Jugular Venous A Waves Present] : normal jugular venous A waves present [Normal Jugular Venous V Waves Present] : normal jugular venous V waves present [No Jugular Venous Dean A Waves] : no jugular venous dean A waves [Respiration, Rhythm And Depth] : normal respiratory rhythm and effort [Exaggerated Use Of Accessory Muscles For Inspiration] : no accessory muscle use [Auscultation Breath Sounds / Voice Sounds] : lungs were clear to auscultation bilaterally [Heart Rate And Rhythm] : heart rate and rhythm were normal [Heart Sounds] : normal S1 and S2 [Systolic grade ___/6] : A grade [unfilled]/6 systolic murmur was heard. [Abdomen Soft] : soft [Abdomen Tenderness] : non-tender [Abdomen Mass (___ Cm)] : no abdominal mass palpated [Abnormal Walk] : normal gait [Gait - Sufficient For Exercise Testing] : the gait was sufficient for exercise testing [Nail Clubbing] : no clubbing of the fingernails [Cyanosis, Localized] : no localized cyanosis [Petechial Hemorrhages (___cm)] : no petechial hemorrhages [Skin Color & Pigmentation] : normal skin color and pigmentation [] : no rash [No Venous Stasis] : no venous stasis [Skin Lesions] : no skin lesions [No Skin Ulcers] : no skin ulcer [No Xanthoma] : no  xanthoma was observed [Oriented To Time, Place, And Person] : oriented to person, place, and time [Affect] : the affect was normal [Mood] : the mood was normal [No Anxiety] : not feeling anxious

## 2020-04-13 NOTE — DISCUSSION/SUMMARY
[FreeTextEntry1] : 49 year old woman with Type 1 DM and premature onset CAD.  Her current symptoms are aty[pical and no ECG changes despite hours of symptoms.  Instructed the patient to try TNG empirically if symptoms recur.  Will follow up in a week.

## 2020-04-13 NOTE — HISTORY OF PRESENT ILLNESS
[FreeTextEntry1] : 49 year old woman with type 1 DM.  S/P acute inferior MI with RCA stents in the distant past.  2012 presented with unstable angina and had LCX stent.  RCA stents found to be occuded at that time.  Over the last week she has had a sharp substernal pressure after eating in the evening, lasting for hours.  She has not had any problems with exertion.  She denies dyspnea or diaphoresis.

## 2020-04-15 RX ORDER — LEVOTHYROXINE SODIUM 0.11 MG/1
112 TABLET ORAL DAILY
Qty: 90 | Refills: 0 | Status: DISCONTINUED | COMMUNITY
Start: 2018-07-09 | End: 2020-04-15

## 2020-04-22 ENCOUNTER — RX RENEWAL (OUTPATIENT)
Age: 50
End: 2020-04-22

## 2020-05-25 ENCOUNTER — RX RENEWAL (OUTPATIENT)
Age: 50
End: 2020-05-25

## 2020-05-28 ENCOUNTER — APPOINTMENT (OUTPATIENT)
Dept: CARDIOLOGY | Facility: CLINIC | Age: 50
End: 2020-05-28
Payer: SELF-PAY

## 2020-05-28 PROCEDURE — 99203 OFFICE O/P NEW LOW 30 MIN: CPT | Mod: 95

## 2020-05-28 RX ORDER — LEVOTHYROXINE SODIUM 0.12 MG/1
125 TABLET ORAL DAILY
Qty: 30 | Refills: 3 | Status: DISCONTINUED | COMMUNITY
Start: 2019-10-29 | End: 2020-05-28

## 2020-05-28 NOTE — HISTORY OF PRESENT ILLNESS
[Home] : at home, [unfilled] , at the time of the visit. [Medical Office: ([facility])___] : at the medical office located in  [Verbal consent obtained from patient] : the patient, [unfilled] [FreeTextEntry1] : This was a telehealth visit using [facility].  Patient is a Type 1 diabetic with premature CAD and pervious coronary stents and MI.  She was having atypical symptoms and a trial of TNG had no effect.  Her symptoms are now rare.  She does housework including vacuuming without chest pain or dyspnea.

## 2020-05-28 NOTE — DISCUSSION/SUMMARY
[FreeTextEntry1] : Patient is stable symptomatically.  She is active and not restricted by discomfort or dyspnea.  See in 6 months.

## 2020-12-15 PROBLEM — Z01.419 ENCOUNTER FOR CERVICAL PAP SMEAR WITH PELVIC EXAM: Status: RESOLVED | Noted: 2017-07-11 | Resolved: 2020-12-15

## 2020-12-21 PROBLEM — Z01.419 ENCOUNTER FOR ANNUAL ROUTINE GYNECOLOGICAL EXAMINATION: Status: RESOLVED | Noted: 2019-07-15 | Resolved: 2020-12-21

## 2021-01-13 ENCOUNTER — TRANSCRIPTION ENCOUNTER (OUTPATIENT)
Age: 51
End: 2021-01-13

## 2021-03-23 ENCOUNTER — RX RENEWAL (OUTPATIENT)
Age: 51
End: 2021-03-23

## 2021-05-27 ENCOUNTER — APPOINTMENT (OUTPATIENT)
Dept: CARDIOLOGY | Facility: CLINIC | Age: 51
End: 2021-05-27
Payer: MEDICAID

## 2021-05-27 VITALS
BODY MASS INDEX: 28.93 KG/M2 | SYSTOLIC BLOOD PRESSURE: 131 MMHG | WEIGHT: 180 LBS | HEIGHT: 66 IN | DIASTOLIC BLOOD PRESSURE: 80 MMHG | OXYGEN SATURATION: 98 % | HEART RATE: 66 BPM

## 2021-05-27 PROCEDURE — 99214 OFFICE O/P EST MOD 30 MIN: CPT

## 2021-05-27 PROCEDURE — 99072 ADDL SUPL MATRL&STAF TM PHE: CPT

## 2021-05-27 PROCEDURE — 93000 ELECTROCARDIOGRAM COMPLETE: CPT

## 2021-05-27 NOTE — HISTORY OF PRESENT ILLNESS
[FreeTextEntry1] : 51 year old with Type I diabetes, cayetano;y onset CAD.  She has been free of angina, but has mild heartburn with eating certain foods.

## 2021-05-27 NOTE — DISCUSSION/SUMMARY
[FreeTextEntry1] : Patient with early onset CAD.  No ischemic symptoms.  LDL will be checked by her endocrinologist in 2 weeks.  Will adjust statin therapy based on that result.

## 2021-06-16 ENCOUNTER — APPOINTMENT (OUTPATIENT)
Dept: ENDOCRINOLOGY | Facility: CLINIC | Age: 51
End: 2021-06-16
Payer: MEDICAID

## 2021-06-16 VITALS
WEIGHT: 192.5 LBS | OXYGEN SATURATION: 98 % | DIASTOLIC BLOOD PRESSURE: 82 MMHG | HEART RATE: 79 BPM | BODY MASS INDEX: 30.94 KG/M2 | HEIGHT: 66 IN | SYSTOLIC BLOOD PRESSURE: 142 MMHG | TEMPERATURE: 98.6 F

## 2021-06-16 PROCEDURE — 36415 COLL VENOUS BLD VENIPUNCTURE: CPT

## 2021-06-16 PROCEDURE — 99072 ADDL SUPL MATRL&STAF TM PHE: CPT

## 2021-06-16 PROCEDURE — 82962 GLUCOSE BLOOD TEST: CPT

## 2021-06-16 PROCEDURE — 99204 OFFICE O/P NEW MOD 45 MIN: CPT | Mod: 25

## 2021-06-16 PROCEDURE — 99214 OFFICE O/P EST MOD 30 MIN: CPT | Mod: 25

## 2021-06-16 PROCEDURE — 83036 HEMOGLOBIN GLYCOSYLATED A1C: CPT | Mod: QW

## 2021-06-22 LAB
25(OH)D3 SERPL-MCNC: 94.7 NG/ML
ALBUMIN SERPL ELPH-MCNC: 4.3 G/DL
ALP BLD-CCNC: 131 U/L
ALT SERPL-CCNC: 41 U/L
ANION GAP SERPL CALC-SCNC: 14 MMOL/L
AST SERPL-CCNC: 30 U/L
BASOPHILS # BLD AUTO: 0.02 K/UL
BASOPHILS NFR BLD AUTO: 0.2 %
BILIRUB SERPL-MCNC: 0.2 MG/DL
BUN SERPL-MCNC: 11 MG/DL
CALCIUM SERPL-MCNC: 10 MG/DL
CHLORIDE SERPL-SCNC: 99 MMOL/L
CHOLEST SERPL-MCNC: 187 MG/DL
CO2 SERPL-SCNC: 23 MMOL/L
CREAT SERPL-MCNC: 0.68 MG/DL
CREAT SPEC-SCNC: 52 MG/DL
EOSINOPHIL # BLD AUTO: 0.17 K/UL
EOSINOPHIL NFR BLD AUTO: 2 %
ESTIMATED AVERAGE GLUCOSE: 212 MG/DL
FRUCTOSAMINE SERPL-MCNC: 300 UMOL/L
GLUCOSE BLDC GLUCOMTR-MCNC: 156
GLUCOSE SERPL-MCNC: 87 MG/DL
GLYCOMARK.: 1.8 UG/ML
HBA1C MFR BLD HPLC: 8.9
HBA1C MFR BLD HPLC: 9 %
HCT VFR BLD CALC: 41.9 %
HDLC SERPL-MCNC: 45 MG/DL
HGB BLD-MCNC: 13.5 G/DL
IMM GRANULOCYTES NFR BLD AUTO: 0.4 %
LDLC SERPL DIRECT ASSAY-MCNC: 91 MG/DL
LYMPHOCYTES # BLD AUTO: 2.31 K/UL
LYMPHOCYTES NFR BLD AUTO: 27 %
MAN DIFF?: NORMAL
MCHC RBC-ENTMCNC: 32.2 GM/DL
MCHC RBC-ENTMCNC: 32.3 PG
MCV RBC AUTO: 100.2 FL
MICROALBUMIN 24H UR DL<=1MG/L-MCNC: <1.2 MG/DL
MICROALBUMIN/CREAT 24H UR-RTO: NORMAL MG/G
MONOCYTES # BLD AUTO: 0.77 K/UL
MONOCYTES NFR BLD AUTO: 9 %
NEUTROPHILS # BLD AUTO: 5.25 K/UL
NEUTROPHILS NFR BLD AUTO: 61.4 %
PLATELET # BLD AUTO: 265 K/UL
POTASSIUM SERPL-SCNC: 4.3 MMOL/L
PROT SERPL-MCNC: 6.9 G/DL
RBC # BLD: 4.18 M/UL
RBC # FLD: 12.9 %
SODIUM SERPL-SCNC: 136 MMOL/L
T3FREE SERPL-MCNC: 2.22 PG/ML
T4 FREE SERPL-MCNC: 1 NG/DL
TRIGL SERPL-MCNC: 285 MG/DL
TSH SERPL-ACNC: 2.22 UIU/ML
WBC # FLD AUTO: 8.55 K/UL

## 2021-06-27 NOTE — HISTORY OF PRESENT ILLNESS
[FreeTextEntry1] : Ms. GORDON NOGUERA  is a 51 year old  female  who presents for initial endocrine evaluation with regard to a hx of type 1 dm. The diabetes has been present since age 8-9. She denies any history of retinopathy or nephropathy. With regard to neuropathy, she denies neurologic s/s. For the diabetes, She is currently taking Basaglar 35 units at bedtime [10-11 pm] and Ademlog up to 10 units pre-meals and occasionally in between meals through "experience." She is not following sliding scale for Admelog. She  denies polyuria, polydipsia, or any visual changes. She  too denies any skin lesions, skin breakdown or non-healing areas of skin. She too denies any podiatric concerns. Ophthalmologic evaluation is up to date. Home glucose monitoring via matilda 1 device has shown values to be running  -----   She does have low BGs in the 40s overnight, is awaken by her dog. \par Using OTC treatment for fungal nail. \par Breakfast: scrambled eggs. \par Lunch: cup of coffee. \par POCT A1c returned today at  8.9 %  \par POCT glucose returned today at 156  mg/dL \par \par Pt reports lack of appetite and abdominal bloating. Too, reports edema of lower extremities.\par Too, reports rapid weight gain. \par \par Additional medical history includes that of HTN, HLD, hypothyroidism. \par She is taking LT4 125 mcg. \par Too, reports acid reflux since the past year for which she is taking famotidine. \par Hx of stent placemen\par \par PMD: Dr. Edwige PETER. \par Cardiologist Dr. Shipman

## 2021-07-06 ENCOUNTER — NON-APPOINTMENT (OUTPATIENT)
Age: 51
End: 2021-07-06

## 2021-08-05 ENCOUNTER — APPOINTMENT (OUTPATIENT)
Dept: ENDOCRINOLOGY | Facility: CLINIC | Age: 51
End: 2021-08-05
Payer: MEDICAID

## 2021-08-05 VITALS
SYSTOLIC BLOOD PRESSURE: 114 MMHG | BODY MASS INDEX: 30.67 KG/M2 | HEART RATE: 70 BPM | WEIGHT: 190 LBS | DIASTOLIC BLOOD PRESSURE: 64 MMHG | OXYGEN SATURATION: 97 % | TEMPERATURE: 98.6 F | RESPIRATION RATE: 16 BRPM

## 2021-08-05 LAB — HBA1C MFR BLD HPLC: 8.2

## 2021-08-05 PROCEDURE — 83036 HEMOGLOBIN GLYCOSYLATED A1C: CPT | Mod: QW

## 2021-08-05 PROCEDURE — 99214 OFFICE O/P EST MOD 30 MIN: CPT

## 2021-08-20 NOTE — HISTORY OF PRESENT ILLNESS
[FreeTextEntry1] : Ms. GORDON NOGUERA  is a 51 year old  female  who returns with regard to a hx of type 1 dm. The diabetes has been present since age 8-9. She denies any history of retinopathy or nephropathy. With regard to neuropathy, she denies neurologic s/s. For the diabetes, She is currently taking Basaglar 20 nits in am and 22 hs  [10-11 pm] and Ademlog up to 10 units pre-meals and occasionally in between meals through "experience." She is not following sliding scale for Admelog12-15 units She  denies polyuria, polydipsia, or any visual changes. She  too denies any skin lesions, skin breakdown or non-healing areas of skin. She too denies any podiatric concerns. Ophthalmologic evaluation is up to date. Home glucose monitoring via matilda 1 device has shown values to be running via matilda 130-200's mostly low  mid 100's\par Using OTC treatment for fungal nail. \par Breakfast: scrambled eggs. \par Lunch: cup of coffee. \par A1c this past June was at 9.0%.\par Vitamn D was at 94.7\par Urinary microalbumin/creatinine ratio was at 36.\par  \par \par Pt reports lack of appetite and abdominal bloating. Too, reports edema of lower extremities.\par Too, reports rapid weight gain. \par \par Additional medical history includes that of HTN, HLD, hypothyroidism. \par She is taking LT4 125 mcg. \par Too, reports acid reflux since the past year for which she is taking famotidine. \par Hx of stent placemen\par \par PMD: Dr. Edwige PETER. \par Cardiologist Dr. Shipman

## 2021-09-15 ENCOUNTER — RX RENEWAL (OUTPATIENT)
Age: 51
End: 2021-09-15

## 2021-09-17 ENCOUNTER — NON-APPOINTMENT (OUTPATIENT)
Age: 51
End: 2021-09-17

## 2021-09-25 ENCOUNTER — TRANSCRIPTION ENCOUNTER (OUTPATIENT)
Age: 51
End: 2021-09-25

## 2021-09-28 ENCOUNTER — RX RENEWAL (OUTPATIENT)
Age: 51
End: 2021-09-28

## 2021-10-04 ENCOUNTER — NON-APPOINTMENT (OUTPATIENT)
Age: 51
End: 2021-10-04

## 2021-10-05 ENCOUNTER — APPOINTMENT (OUTPATIENT)
Dept: ENDOCRINOLOGY | Facility: CLINIC | Age: 51
End: 2021-10-05
Payer: MEDICAID

## 2021-10-05 VITALS
HEART RATE: 79 BPM | BODY MASS INDEX: 29.75 KG/M2 | TEMPERATURE: 98.2 F | OXYGEN SATURATION: 98 % | SYSTOLIC BLOOD PRESSURE: 123 MMHG | HEIGHT: 66 IN | DIASTOLIC BLOOD PRESSURE: 78 MMHG | WEIGHT: 185.13 LBS

## 2021-10-05 LAB
GLUCOSE BLDC GLUCOMTR-MCNC: 147
HBA1C MFR BLD HPLC: 7.2

## 2021-10-05 PROCEDURE — 36415 COLL VENOUS BLD VENIPUNCTURE: CPT

## 2021-10-05 PROCEDURE — 99214 OFFICE O/P EST MOD 30 MIN: CPT | Mod: 25

## 2021-10-05 PROCEDURE — 83036 HEMOGLOBIN GLYCOSYLATED A1C: CPT | Mod: QW

## 2021-10-05 PROCEDURE — 95251 CONT GLUC MNTR ANALYSIS I&R: CPT

## 2021-10-05 NOTE — HISTORY OF PRESENT ILLNESS
[FreeTextEntry1] : Ms. GORDON NOGUERA  is a 51 year old  female  who returns with regard to a hx of type 1 dm. The diabetes has been present since age 8-9. She denies any history of retinopathy or nephropathy. With regard to neuropathy, she denies neurologic s/s. For the diabetes, She is currently taking Basaglar 22  in am and 18 hs  [10-11 pm] and Ademlog up to 10 units pre-meals and occasionally in between meals through "experience." (does not take before dinner, but sometimes will take when she is not eating.) She is not following sliding scale for Admelog12-15 units. Also on Ozempic 0.5 mg but it is not covered by her insurance. Will provide her samples when available.  Did experience nausea for the first dose of 0.5 mg but has not experienced since. She  denies polyuria, polydipsia, or any visual changes. She  too denies any skin lesions, skin breakdown or non-healing areas of skin. She too denies any podiatric concerns. Ophthalmologic evaluation is up to date. Home glucose monitoring via matilda 1 device has shown values to be running  in the 80s-70s over night,  high 100s throughout the rest of the day. Does spike after meals.  This morning her bg was 97, without eating or taking insulin her BG was 147 when she came to the office.  For lunch she usually eats a chicken and cheese or tuna sandwich. Not eating much for dinner. \par POCT A1c returned today at  7.25 ; previously 8.2% from 8/5/2021\par POCT glucose returned today at  147  mg/dL \par Vitamin D was at 94.7\par Urinary microalbumin/creatinine ratio was at 36.\par  \par \par Pt reports lack of appetite and abdominal bloating. Too, reports edema of lower extremities.\par Too, reports rapid weight gain. \par \par Additional medical history includes that of HTN, HLD, hypothyroidism. \par She is taking LT4 125 mcg. \par Too, reports acid reflux since the past year for which she is taking famotidine. \par Also taking enalapril 20 mg, amlodipine, and Atorvastatin. Has discontinued Losartan. On calcium 1200 mg with vitamin D. \par Hx of stent placemen\par \par PMD: Dr. Edwige CARMONA \par Cardiologist Dr. Shipman

## 2021-10-18 ENCOUNTER — RX RENEWAL (OUTPATIENT)
Age: 51
End: 2021-10-18

## 2021-10-20 LAB
25(OH)D3 SERPL-MCNC: 49.4 NG/ML
CREAT SPEC-SCNC: 48 MG/DL
MICROALBUMIN 24H UR DL<=1MG/L-MCNC: 1.2 MG/DL
MICROALBUMIN/CREAT 24H UR-RTO: 25 MG/G
T3FREE SERPL-MCNC: 2.05 PG/ML
T4 FREE SERPL-MCNC: 1.2 NG/DL
THYROGLOB AB SERPL-ACNC: 1656 IU/ML
THYROPEROXIDASE AB SERPL IA-ACNC: 689 IU/ML
TSH SERPL-ACNC: 1.07 UIU/ML
VIT B12 SERPL-MCNC: 806 PG/ML

## 2021-10-20 RX ORDER — LEVOTHYROXINE SODIUM 0.11 MG/1
112 TABLET ORAL DAILY
Qty: 30 | Refills: 1 | Status: DISCONTINUED | COMMUNITY
Start: 2020-05-28 | End: 2021-10-20

## 2021-12-13 NOTE — ED ADULT NURSE NOTE - RESPIRATORY WDL
70.3
Breathing spontaneous and unlabored. Breath sounds clear and equal bilaterally with regular rhythm.

## 2021-12-23 ENCOUNTER — NON-APPOINTMENT (OUTPATIENT)
Age: 51
End: 2021-12-23

## 2021-12-23 ENCOUNTER — APPOINTMENT (OUTPATIENT)
Dept: CARDIOLOGY | Facility: CLINIC | Age: 51
End: 2021-12-23
Payer: MEDICAID

## 2021-12-23 VITALS
OXYGEN SATURATION: 97 % | SYSTOLIC BLOOD PRESSURE: 132 MMHG | HEIGHT: 66 IN | DIASTOLIC BLOOD PRESSURE: 78 MMHG | HEART RATE: 70 BPM

## 2021-12-23 DIAGNOSIS — R06.89 OTHER ABNORMALITIES OF BREATHING: ICD-10-CM

## 2021-12-23 PROCEDURE — 93000 ELECTROCARDIOGRAM COMPLETE: CPT

## 2021-12-23 PROCEDURE — 99214 OFFICE O/P EST MOD 30 MIN: CPT

## 2021-12-23 NOTE — HISTORY OF PRESENT ILLNESS
[FreeTextEntry1] : 51 year old woman with Type 1 DM and early onset CAD.  She has been free of chest discomfort but notes increased SOB and anxiety which she feels is related to the current COVID crisis.

## 2021-12-23 NOTE — DISCUSSION/SUMMARY
[FreeTextEntry1] : Dyspnea.  Does not appear to be ischemic in origin  Echocardiogram to evaluate LV function ordered.  CAD stable with no ischemic symptoms  HDL  repeat lipid profile on increased atorvastatin

## 2022-01-11 ENCOUNTER — NON-APPOINTMENT (OUTPATIENT)
Age: 52
End: 2022-01-11

## 2022-01-14 ENCOUNTER — NON-APPOINTMENT (OUTPATIENT)
Age: 52
End: 2022-01-14

## 2022-03-11 ENCOUNTER — APPOINTMENT (OUTPATIENT)
Dept: ENDOCRINOLOGY | Facility: CLINIC | Age: 52
End: 2022-03-11
Payer: MEDICAID

## 2022-03-11 VITALS
OXYGEN SATURATION: 99 % | RESPIRATION RATE: 15 BRPM | TEMPERATURE: 98.6 F | HEART RATE: 79 BPM | WEIGHT: 181 LBS | SYSTOLIC BLOOD PRESSURE: 118 MMHG | BODY MASS INDEX: 29.21 KG/M2 | DIASTOLIC BLOOD PRESSURE: 68 MMHG

## 2022-03-11 LAB — HBA1C MFR BLD HPLC: 6.7

## 2022-03-11 PROCEDURE — 99214 OFFICE O/P EST MOD 30 MIN: CPT | Mod: 25

## 2022-03-11 PROCEDURE — 83036 HEMOGLOBIN GLYCOSYLATED A1C: CPT | Mod: QW

## 2022-03-11 PROCEDURE — 95251 CONT GLUC MNTR ANALYSIS I&R: CPT

## 2022-03-11 NOTE — HISTORY OF PRESENT ILLNESS
[FreeTextEntry1] : Ms. GORDON NOGUERA  is a 51 year old  female  who returns with regard to a hx of type 1 dm. The diabetes has been present since age 8-9. She denies any history of retinopathy or nephropathy. With regard to neuropathy, she denies neurologic s/s. For the diabetes, She is currently taking Basaglar 26 in am and usually 14 units hs but took 16 hs last night [10-11 pm] and Admelog up to 10 units pre-meals and occasionally in between meals through "experience." She is not following sliding scale for Admelog 12-15 units She is also taking Ozempic 0.5 mg weekly. She has been tolerating Ozempic very well. She denies polyuria, polydipsia, or any visual changes. She  too denies any skin lesions, skin breakdown or non-healing areas of skin. She too denies any podiatric concerns. Ophthalmologic evaluation is up to date. Home glucose monitoring via matilda has shown values to be running in the 200s .\par \par POCT A1c returned today at 6.7% ; previously 7.2% 10/05/2021\par POCT glucose returned today at 52 mg/dL \par A1c from Octobner of 2021 was down to 7.2 %  Vitamin d 25-OH was at 49 with prior value last JJune at 94.\par \par Urinary microalbumin/creatinine ratio was at 36.\par  \par Last visit had noted weight gain and some LE swelling.\par \par Additional medical history includes that of HTN, HLD, hypothyroidism. \par She is taking LT4 125 mcg. \par Too, reports acid reflux since the past year for which she is taking famotidine. \par Hx of stent placemen\par \par PMD: Dr. Edwige PETER. \par Cardiologist Dr. Shipman

## 2022-03-14 LAB
25(OH)D3 SERPL-MCNC: 36.5 NG/ML
ABO + RH PNL BLD: NORMAL
ALBUMIN SERPL ELPH-MCNC: 4.2 G/DL
ALP BLD-CCNC: 111 U/L
ALT SERPL-CCNC: 27 U/L
ANION GAP SERPL CALC-SCNC: 15 MMOL/L
AST SERPL-CCNC: 23 U/L
BILIRUB SERPL-MCNC: 0.3 MG/DL
BUN SERPL-MCNC: 12 MG/DL
CALCIUM SERPL-MCNC: 9.6 MG/DL
CHLORIDE SERPL-SCNC: 107 MMOL/L
CHOLEST SERPL-MCNC: 143 MG/DL
CO2 SERPL-SCNC: 21 MMOL/L
CREAT SERPL-MCNC: 0.51 MG/DL
CREAT SPEC-SCNC: 137 MG/DL
EGFR: 112 ML/MIN/1.73M2
ESTIMATED AVERAGE GLUCOSE: 148 MG/DL
FRUCTOSAMINE SERPL-MCNC: 225 UMOL/L
GLUCOSE SERPL-MCNC: 81 MG/DL
GLYCOMARK.: 11.2 UG/ML
HBA1C MFR BLD HPLC: 6.8 %
HDLC SERPL-MCNC: 41 MG/DL
LDLC SERPL DIRECT ASSAY-MCNC: 73 MG/DL
MICROALBUMIN 24H UR DL<=1MG/L-MCNC: <1.2 MG/DL
MICROALBUMIN/CREAT 24H UR-RTO: NORMAL MG/G
POTASSIUM SERPL-SCNC: 4.2 MMOL/L
PROT SERPL-MCNC: 6.9 G/DL
SODIUM SERPL-SCNC: 143 MMOL/L
T3FREE SERPL-MCNC: 2.69 PG/ML
T4 FREE SERPL-MCNC: 1.3 NG/DL
TRIGL SERPL-MCNC: 157 MG/DL
TSH SERPL-ACNC: 0.29 UIU/ML
VIT B12 SERPL-MCNC: 559 PG/ML

## 2022-04-07 ENCOUNTER — APPOINTMENT (OUTPATIENT)
Dept: INTERNAL MEDICINE | Facility: CLINIC | Age: 52
End: 2022-04-07

## 2022-04-11 ENCOUNTER — TRANSCRIPTION ENCOUNTER (OUTPATIENT)
Age: 52
End: 2022-04-11

## 2022-05-09 ENCOUNTER — NON-APPOINTMENT (OUTPATIENT)
Age: 52
End: 2022-05-09

## 2022-05-13 ENCOUNTER — NON-APPOINTMENT (OUTPATIENT)
Age: 52
End: 2022-05-13

## 2022-08-17 ENCOUNTER — APPOINTMENT (OUTPATIENT)
Dept: CV DIAGNOSITCS | Facility: HOSPITAL | Age: 52
End: 2022-08-17

## 2022-08-17 ENCOUNTER — OUTPATIENT (OUTPATIENT)
Dept: OUTPATIENT SERVICES | Facility: HOSPITAL | Age: 52
LOS: 1 days | End: 2022-08-17

## 2022-08-17 DIAGNOSIS — I05.9 RHEUMATIC MITRAL VALVE DISEASE, UNSPECIFIED: ICD-10-CM

## 2022-08-17 PROCEDURE — 93306 TTE W/DOPPLER COMPLETE: CPT | Mod: 26

## 2022-08-22 ENCOUNTER — APPOINTMENT (OUTPATIENT)
Dept: ENDOCRINOLOGY | Facility: CLINIC | Age: 52
End: 2022-08-22

## 2022-08-22 VITALS
BODY MASS INDEX: 31.31 KG/M2 | SYSTOLIC BLOOD PRESSURE: 122 MMHG | HEART RATE: 74 BPM | TEMPERATURE: 98.6 F | DIASTOLIC BLOOD PRESSURE: 78 MMHG | OXYGEN SATURATION: 98 % | WEIGHT: 194 LBS | RESPIRATION RATE: 15 BRPM

## 2022-08-22 LAB
GLUCOSE BLDC GLUCOMTR-MCNC: 196
HBA1C MFR BLD HPLC: 7.7

## 2022-08-22 PROCEDURE — 99214 OFFICE O/P EST MOD 30 MIN: CPT | Mod: 25

## 2022-08-22 PROCEDURE — 83036 HEMOGLOBIN GLYCOSYLATED A1C: CPT | Mod: QW

## 2022-08-22 PROCEDURE — 36415 COLL VENOUS BLD VENIPUNCTURE: CPT

## 2022-08-22 NOTE — HISTORY OF PRESENT ILLNESS
[FreeTextEntry1] : Ms. GORDON NOGUERA is a 52 year old female who returns with regard to a hx of type 1 dm. The diabetes has been present since age 8-9. She denies any history of retinopathy or nephropathy. With regard to neuropathy, she denies neurologic s/s. \par \par For the diabetes, She is currently taking Basaglar 26 in am and usually 14 units hs but took 16 hs last night [10-11 pm] and Admelog up to 10 units pre-meals and occasionally in between meals through "experience." She is not following sliding scale for Admelog 12-15 units She is also taking Ozempic 0.5 mg weekly. She has been tolerating Ozempic very well. \par \par She denies polyuria, polydipsia, or any visual changes. She too denies any skin lesions, skin breakdown or non-healing areas of skin. She too denies any podiatric concerns. Ophthalmologic evaluation is up to date. With regards to neuropathy, she does note a numbing sensation in her legs and a pain in her left arm. \par \par Home glucose monitoring via matilda has shown values to be running 70-80s in the am and in the afternoon have up and downs mostlye in the 200s with some values in the 300s.\par Patient reports needing more insulin \par \par POCT A1c returned today at 7.7% ; previously  6.8% on 3/11/22 \par POCT glucose returned today at 196.\par \par  \par Last visit had noted weight gain and some LE swelling.\par \par Additional medical history includes that of HTN, HLD, hypothyroidism. \par She is taking LT4 125 mcg\par She does note that she is very tired. \par Too, reports acid reflux since the past year for which she is taking famotidine. \par Hx of stent placement.\par Vitamin D 25-OH returned at 36.5.\par \par PMD: Dr. Edwige PETER. \par Cardiologist Dr. Shipman \par

## 2022-08-23 LAB
25(OH)D3 SERPL-MCNC: 28.8 NG/ML
ALBUMIN SERPL ELPH-MCNC: 4.3 G/DL
ALP BLD-CCNC: 116 U/L
ALT SERPL-CCNC: 24 U/L
ANION GAP SERPL CALC-SCNC: 12 MMOL/L
AST SERPL-CCNC: 20 U/L
BILIRUB SERPL-MCNC: 0.2 MG/DL
BUN SERPL-MCNC: 11 MG/DL
CALCIUM SERPL-MCNC: 9.6 MG/DL
CHLORIDE SERPL-SCNC: 103 MMOL/L
CHOLEST SERPL-MCNC: 165 MG/DL
CO2 SERPL-SCNC: 23 MMOL/L
CREAT SERPL-MCNC: 0.55 MG/DL
CREAT SPEC-SCNC: 33 MG/DL
EGFR: 110 ML/MIN/1.73M2
ESTIMATED AVERAGE GLUCOSE: 174 MG/DL
FRUCTOSAMINE SERPL-MCNC: 265 UMOL/L
GLUCOSE SERPL-MCNC: 164 MG/DL
GLYCOMARK.: 5.8 UG/ML
HBA1C MFR BLD HPLC: 7.7 %
HDLC SERPL-MCNC: 43 MG/DL
LDLC SERPL DIRECT ASSAY-MCNC: 85 MG/DL
MICROALBUMIN 24H UR DL<=1MG/L-MCNC: <1.2 MG/DL
MICROALBUMIN/CREAT 24H UR-RTO: NORMAL MG/G
POTASSIUM SERPL-SCNC: 4.5 MMOL/L
PROT SERPL-MCNC: 6.7 G/DL
SODIUM SERPL-SCNC: 139 MMOL/L
T3FREE SERPL-MCNC: 1.9 PG/ML
T4 FREE SERPL-MCNC: 0.9 NG/DL
TRIGL SERPL-MCNC: 211 MG/DL
TSH SERPL-ACNC: 5.85 UIU/ML

## 2022-08-25 ENCOUNTER — APPOINTMENT (OUTPATIENT)
Dept: CARDIOLOGY | Facility: CLINIC | Age: 52
End: 2022-08-25

## 2022-08-25 VITALS
WEIGHT: 194 LBS | BODY MASS INDEX: 31.18 KG/M2 | HEART RATE: 69 BPM | DIASTOLIC BLOOD PRESSURE: 73 MMHG | HEIGHT: 66 IN | SYSTOLIC BLOOD PRESSURE: 120 MMHG | OXYGEN SATURATION: 98 %

## 2022-08-25 DIAGNOSIS — R60.0 LOCALIZED EDEMA: ICD-10-CM

## 2022-08-25 PROCEDURE — 99214 OFFICE O/P EST MOD 30 MIN: CPT | Mod: 25

## 2022-08-25 PROCEDURE — 93000 ELECTROCARDIOGRAM COMPLETE: CPT

## 2022-08-26 NOTE — DISCUSSION/SUMMARY
[FreeTextEntry1] : Fatigue and daytime sleepiness.  Metoprolol changed to 50 mg extended release, sleep study ordered.  Edema  Hctz 12.5 mg ordered.   [EKG obtained to assist in diagnosis and management of assessed problem(s)] : EKG obtained to assist in diagnosis and management of assessed problem(s)

## 2022-08-26 NOTE — HISTORY OF PRESENT ILLNESS
[FreeTextEntry1] : 52 year old woman with Type 1 diabetes and early onset CAD.  S/P acute MI and coronary stents in 2012.  Recent echo shows good LV function but she has severe fatigue and daytime sleepiness.  She denies chest discomfort or GIRALDO.  she was found to be hypothyroid and her Synthroid was increased.

## 2022-08-26 NOTE — PHYSICAL EXAM

## 2022-09-22 ENCOUNTER — NON-APPOINTMENT (OUTPATIENT)
Age: 52
End: 2022-09-22

## 2022-09-22 DIAGNOSIS — Z20.822 CONTACT WITH AND (SUSPECTED) EXPOSURE TO COVID-19: ICD-10-CM

## 2022-10-27 ENCOUNTER — APPOINTMENT (OUTPATIENT)
Dept: ENDOCRINOLOGY | Facility: CLINIC | Age: 52
End: 2022-10-27

## 2022-10-27 ENCOUNTER — APPOINTMENT (OUTPATIENT)
Dept: INTERNAL MEDICINE | Facility: CLINIC | Age: 52
End: 2022-10-27

## 2022-10-27 VITALS
DIASTOLIC BLOOD PRESSURE: 78 MMHG | HEIGHT: 66 IN | OXYGEN SATURATION: 98 % | SYSTOLIC BLOOD PRESSURE: 124 MMHG | TEMPERATURE: 98.2 F | BODY MASS INDEX: 29.77 KG/M2 | HEART RATE: 78 BPM | WEIGHT: 185.25 LBS

## 2022-10-27 LAB
GLUCOSE BLDC GLUCOMTR-MCNC: 192
HBA1C MFR BLD HPLC: 7.2

## 2022-10-27 PROCEDURE — 95251 CONT GLUC MNTR ANALYSIS I&R: CPT

## 2022-10-27 PROCEDURE — 83036 HEMOGLOBIN GLYCOSYLATED A1C: CPT | Mod: QW

## 2022-10-27 PROCEDURE — 36415 COLL VENOUS BLD VENIPUNCTURE: CPT

## 2022-10-27 PROCEDURE — 99214 OFFICE O/P EST MOD 30 MIN: CPT | Mod: 25

## 2022-10-30 NOTE — HISTORY OF PRESENT ILLNESS
[FreeTextEntry1] : Ms. GORDON NOGUERA is a 52 year old female who returns with regard to a hx of type 1 dm. The diabetes has been present since age 8-9. She denies any history of retinopathy or nephropathy. With regard to neuropathy, she denies neurologic s/s. \par \par For the diabetes, She is currently taking Basaglar 31 in am and usually 12 units hs and Admelog up to 10 units pre-meals and occasionally in between meals through "experience." She is not following sliding scale for Admelog 12-15 units. She does note taking Admelog 3-4 units at times when her BG is too high. She is also taking Ozempic 0.5 mg weekly. She has been tolerating Ozempic very well. \par \par She denies polyuria, polydipsia, or any visual changes. She too denies any skin lesions, skin breakdown or non-healing areas of skin. She too denies any podiatric concerns. Ophthalmologic evaluation is up to date. With regards to neuropathy, she does note a numbing sensation in her legs and a pain in her left arm. \par \par Home glucose monitoring via matilda has shown values to be \par She does note increased stress as of late and notes that her BG will fluctuate within minutes at times with no explanation. \par \par POCT A1c returned today at  7.2%, A1C returned previously at 7.7 % on 8/22/22 \par POCT glucose returned today at 192 mg/dl\par \par Last visit had noted weight gain and some LE swelling. Placed on HCTZ as 12.5 mg as per her cardiologist. \par \par Additional medical history includes that of HTN, HLD, hypothyroidism, Hx of stent placement.\par She is taking LT4 137 mcg, increased from 125 mcg on 8/23/22 as TSH 5.85\par \par Too, reports acid reflux since the past year for which she is taking famotidine. \par \par PMD: Dr. Edwige PETER. \par Cardiologist Dr. Shipman

## 2023-02-08 ENCOUNTER — NON-APPOINTMENT (OUTPATIENT)
Age: 53
End: 2023-02-08

## 2023-03-10 RX ORDER — BLOOD SUGAR DIAGNOSTIC
STRIP MISCELLANEOUS
Qty: 6 | Refills: 0 | Status: DISCONTINUED | COMMUNITY
Start: 2021-02-03 | End: 2023-03-10

## 2023-03-10 RX ORDER — LANCETS
EACH MISCELLANEOUS
Qty: 6 | Refills: 0 | Status: DISCONTINUED | COMMUNITY
Start: 2021-02-03 | End: 2023-03-10

## 2023-03-10 RX ORDER — BLOOD-GLUCOSE METER
W/DEVICE EACH MISCELLANEOUS
Qty: 1 | Refills: 0 | Status: DISCONTINUED | COMMUNITY
Start: 2021-02-03 | End: 2023-03-10

## 2023-03-10 RX ORDER — FLASH GLUCOSE SENSOR
KIT MISCELLANEOUS
Qty: 2 | Refills: 4 | Status: DISCONTINUED | COMMUNITY
Start: 2019-10-22 | End: 2023-03-10

## 2023-03-10 RX ORDER — BLOOD SUGAR DIAGNOSTIC
STRIP MISCELLANEOUS
Qty: 6 | Refills: 0 | Status: DISCONTINUED | COMMUNITY
Start: 2018-02-26 | End: 2023-03-10

## 2023-03-10 RX ORDER — FLASH GLUCOSE SENSOR
KIT MISCELLANEOUS
Qty: 1 | Refills: 0 | Status: DISCONTINUED | COMMUNITY
Start: 2019-10-22 | End: 2023-03-10

## 2023-04-04 ENCOUNTER — EMERGENCY (EMERGENCY)
Facility: HOSPITAL | Age: 53
LOS: 0 days | Discharge: LEFT BEFORE DISCHARGE | End: 2023-04-05
Attending: STUDENT IN AN ORGANIZED HEALTH CARE EDUCATION/TRAINING PROGRAM
Payer: MEDICAID

## 2023-04-04 VITALS
DIASTOLIC BLOOD PRESSURE: 77 MMHG | TEMPERATURE: 98 F | HEART RATE: 95 BPM | HEIGHT: 66 IN | WEIGHT: 179.9 LBS | RESPIRATION RATE: 17 BRPM | SYSTOLIC BLOOD PRESSURE: 145 MMHG | OXYGEN SATURATION: 96 %

## 2023-04-04 PROCEDURE — 93010 ELECTROCARDIOGRAM REPORT: CPT

## 2023-04-04 PROCEDURE — L9991: CPT

## 2023-04-04 NOTE — ED ADULT TRIAGE NOTE - PRO INTERPRETER NEED 2
Additional Notes: Patient has not gotten flu shot but has it scheduled for this month, no Advance Care proxy yet English Detail Level: Detailed Quality 130: Documentation Of Current Medications In The Medical Record: Current Medications Documented Quality 111:Pneumonia Vaccination Status For Older Adults: Pneumococcal Vaccination Previously Received Quality 47: Advance Care Plan: Advance Care Planning discussed and documented in the medical record; patient did not wish or was not able to name a surrogate decision maker or provide an advance care plan. Quality 431: Preventive Care And Screening: Unhealthy Alcohol Use - Screening: Patient screened for unhealthy alcohol use using a single question and scores 2 or greater episodes per year and brief intervention did not occur Quality 110: Preventive Care And Screening: Influenza Immunization: Influenza immunization was not ordered or administered, reason not given Quality 226: Preventive Care And Screening: Tobacco Use: Screening And Cessation Intervention: Patient screened for tobacco and is an ex-smoker

## 2023-04-04 NOTE — ED ADULT TRIAGE NOTE - CHIEF COMPLAINT QUOTE
chest pain upper epigastric area stared since 8pm feels better after Vilma given by EMS , smoke and hx 2 stent 10 years ago

## 2023-04-05 VITALS
RESPIRATION RATE: 18 BRPM | OXYGEN SATURATION: 98 % | SYSTOLIC BLOOD PRESSURE: 145 MMHG | DIASTOLIC BLOOD PRESSURE: 80 MMHG | HEART RATE: 90 BPM

## 2023-04-05 DIAGNOSIS — Z53.21 PROCEDURE AND TREATMENT NOT CARRIED OUT DUE TO PATIENT LEAVING PRIOR TO BEING SEEN BY HEALTH CARE PROVIDER: ICD-10-CM

## 2023-04-05 DIAGNOSIS — R07.9 CHEST PAIN, UNSPECIFIED: ICD-10-CM

## 2023-04-05 NOTE — ED ADULT NURSE REASSESSMENT NOTE - NS ED NURSE REASSESS COMMENT FT1
pt alert and oriented x4. pt states she does not wish to wait for further testing. no IV placed in ER. Dr. Hogue made aware. ambulates independently with steady gait.

## 2023-04-06 ENCOUNTER — NON-APPOINTMENT (OUTPATIENT)
Age: 53
End: 2023-04-06

## 2023-04-06 ENCOUNTER — APPOINTMENT (OUTPATIENT)
Dept: CARDIOLOGY | Facility: CLINIC | Age: 53
End: 2023-04-06
Payer: MEDICAID

## 2023-04-06 VITALS
OXYGEN SATURATION: 99 % | SYSTOLIC BLOOD PRESSURE: 120 MMHG | DIASTOLIC BLOOD PRESSURE: 62 MMHG | HEIGHT: 66 IN | WEIGHT: 185 LBS | BODY MASS INDEX: 29.73 KG/M2 | RESPIRATION RATE: 16 BRPM | HEART RATE: 80 BPM

## 2023-04-06 DIAGNOSIS — R07.89 OTHER CHEST PAIN: ICD-10-CM

## 2023-04-06 PROCEDURE — 93000 ELECTROCARDIOGRAM COMPLETE: CPT

## 2023-04-06 PROCEDURE — 99214 OFFICE O/P EST MOD 30 MIN: CPT | Mod: 25

## 2023-04-06 NOTE — DISCUSSION/SUMMARY
[FreeTextEntry1] : Episode of acute chest discomfort in a patient with DM Type 1, CAD since 2010.  Troponin ordered.  Nuclear stress ordered.  iI troponin is abnormal, patient realizes that a repeat cardiac cath will be indicated. [EKG obtained to assist in diagnosis and management of assessed problem(s)] : EKG obtained to assist in diagnosis and management of assessed problem(s)

## 2023-04-06 NOTE — HISTORY OF PRESENT ILLNESS
[FreeTextEntry1] : 52 year old patient with Type 1 diabetes.  Acute IMI in 2010 treated with RCA stents.  \par ACS in 2012.  Cath showed progression of disease in LCX treated with stent.  RCA stents shown to be occluded at that time.  She had been free of recurrent ischemic symptoms.  After cleaning house, she experienced a prolonged episode of upper chest and back discomfort.  This was associated with SOB but no diaphoresis.  She was seen in the ED at [facility] with no acute ECG changes.  Since this episode, she has resumed activities including her usual walk without discomfort or SOB.

## 2023-04-10 ENCOUNTER — FORM ENCOUNTER (OUTPATIENT)
Age: 53
End: 2023-04-10

## 2023-04-10 LAB
HCT VFR BLD CALC: 41.5 %
HGB BLD-MCNC: 13.5 G/DL
MCHC RBC-ENTMCNC: 32.4 PG
MCHC RBC-ENTMCNC: 32.5 GM/DL
MCV RBC AUTO: 99.5 FL
PLATELET # BLD AUTO: 269 K/UL
RBC # BLD: 4.17 M/UL
RBC # FLD: 12.5 %
WBC # FLD AUTO: 10.81 K/UL

## 2023-04-10 RX ORDER — ASPIRIN/CALCIUM CARB/MAGNESIUM 324 MG
1 TABLET ORAL
Qty: 0 | Refills: 0 | DISCHARGE

## 2023-04-10 RX ORDER — ATORVASTATIN CALCIUM 80 MG/1
1 TABLET, FILM COATED ORAL
Qty: 0 | Refills: 0 | DISCHARGE

## 2023-04-10 RX ORDER — AMLODIPINE BESYLATE 2.5 MG/1
0 TABLET ORAL
Qty: 0 | Refills: 0 | DISCHARGE

## 2023-04-10 RX ORDER — METOPROLOL TARTRATE 50 MG
1 TABLET ORAL
Qty: 0 | Refills: 0 | DISCHARGE

## 2023-04-10 NOTE — H&P ADULT - NSICDXPASTMEDICALHX_GEN_ALL_CORE_FT
PAST MEDICAL HISTORY:  Anemia due to blood loss Menorrhagia- 2017 June - 2 U PRBC Placed, Mirena IUD placement 6/2017    CAD (Coronary Artery Disease)     Cervical dysplasia     Essential Hypertension     History of Hypothyroidism     Hypercholesteremia     ST elevation myocardial infarction involving right coronary artery 6/26/2010- S/P 2 STENTS [facility]    Stented coronary artery 2010 x 2 - [facility], 2012 X1 [facility]    Type 1 diabetes mellitus, with long-term current use of insulin age    7    Vitamin D deficiency

## 2023-04-10 NOTE — CHART NOTE - NSCHARTNOTEFT_GEN_A_CORE
Preop Phone Call: 12pm 4/10/22  - Able to Reach Patient:  yes  - Info given to:	patient having cardiac catheterization  -  and allergies confirmed: yes  - Medication Information Given: yes  - Medications To Take (specify)	Ok to take a.m. meds w/sip of water  - Medications To Hold (Specify)	insulin in AM if decides not eat- patient closely monitors FS   - Arrival Time: 0915  - NPO after:	0700  - Understanding of Information Verbalized: yes  will have a  over the age of 18  for d/c home  - Understanding of possible overnight stay if stent is placed: yes

## 2023-04-10 NOTE — H&P ADULT - HISTORY OF PRESENT ILLNESS
51 y/o F with PMHx of T1DM, MI 2010, HTN presented to cardiology with c/o CP/SOB with exertion. Was seen at [facility] ED for this episode and released for no EKG changes, neg CE. Able to resume light activity with no resumption of symptoms. Given hx of CAD and T1DM patient referred for cardiac cath to further evaluate  51 y/o F with PMHx of T1DM, MI 2010/2012 ( RCA/LCX), HTN presented to cardiology with c/o CP/SOB with exertion. Was seen at [facility] ED for this episode and released for no EKG changes, neg CE. Able to resume light activity with no resumption of symptoms. Given hx of CAD and T1DM patient referred for cardiac cath to further evaluate

## 2023-04-10 NOTE — H&P ADULT - NSICDXFAMILYHX_GEN_ALL_CORE_FT
FAMILY HISTORY:  No pertinent family history in first degree relatives     FAMILY HISTORY:  Mother  Still living? No  FH: heart attack, Age at diagnosis: Age Unknown

## 2023-04-10 NOTE — H&P ADULT - NSHPSOCIALHISTORY_GEN_ALL_CORE
Pt uses tobacco twice per year  daily alcohol use ( 1 glass of wine with dinner)  denies illicit drug use.     Lives with  Uche [phone]

## 2023-04-10 NOTE — H&P ADULT - ASSESSMENT
53 y/o F with PMHx of T1DM, MI 2010, HTN presented to cardiology with c/o CP/SOB with exertion. Was seen at [facility] ED for this episode and released for no EKG changes, neg CE. Able to resume light activity with no resumption of symptoms. Given hx of CAD and T1DM patient referred for cardiac cath to further evaluate     ASA class:  Creatinine:  GFR:  Bleeding  Risk score:  Gabino Score:  53 y/o F with PMHx of T1DM, MI 2010, HTN presented to cardiology with c/o CP/SOB with exertion. Was seen at [facility] ED for this episode and released for no EKG changes, neg CE. Able to resume light activity with no resumption of symptoms. Given hx of CAD and T1DM patient referred for cardiac cath to further evaluate     ASA class: ii  Creatinine:0.56  GFR: 109  Bleeding  Risk score:  2.8%  Gabino Score:  4 pts   Mallampati Score: III

## 2023-04-10 NOTE — H&P ADULT - PROBLEM SELECTOR PLAN 1
a/w CP/SOB   -plan for cardiac cath for ischemic work up  - KANE protocol pre hydration: NS 250cc IV bolus x1   -Consent obtained for cardiac catheterization w/ coronary angiogram and possible stent placement, with possible sedation and analgia. Pt is competent, has capacity, and understands risks and benefits of procedure. Risks and benefits discussed. Risk discussed included, but not limited to MI, stroke, mortality, major bleeding, arrythmia, or infection. All questions answered

## 2023-04-10 NOTE — H&P ADULT - NSHPOUTPATIENTPROVIDERS_GEN_ALL_CORE
[facility] Refill Guideline reviewed.  Medication meets criteria for refill.     
[place]

## 2023-04-11 ENCOUNTER — OUTPATIENT (OUTPATIENT)
Dept: OUTPATIENT SERVICES | Facility: HOSPITAL | Age: 53
LOS: 1 days | Discharge: HOME | End: 2023-04-11
Payer: MEDICAID

## 2023-04-11 VITALS
OXYGEN SATURATION: 100 % | DIASTOLIC BLOOD PRESSURE: 63 MMHG | TEMPERATURE: 98 F | HEART RATE: 85 BPM | WEIGHT: 179.9 LBS | HEIGHT: 66 IN | RESPIRATION RATE: 16 BRPM | SYSTOLIC BLOOD PRESSURE: 137 MMHG

## 2023-04-11 VITALS
RESPIRATION RATE: 16 BRPM | DIASTOLIC BLOOD PRESSURE: 78 MMHG | OXYGEN SATURATION: 100 % | SYSTOLIC BLOOD PRESSURE: 142 MMHG | HEART RATE: 72 BPM

## 2023-04-11 DIAGNOSIS — I25.10 ATHEROSCLEROTIC HEART DISEASE OF NATIVE CORONARY ARTERY WITHOUT ANGINA PECTORIS: ICD-10-CM

## 2023-04-11 LAB
ANION GAP SERPL CALC-SCNC: 14 MMOL/L
BUN SERPL-MCNC: 13 MG/DL
CALCIUM SERPL-MCNC: 10 MG/DL
CHLORIDE SERPL-SCNC: 102 MMOL/L
CO2 SERPL-SCNC: 25 MMOL/L
CREAT SERPL-MCNC: 0.56 MG/DL
EGFR: 109 ML/MIN/1.73M2
GLUCOSE SERPL-MCNC: 96 MG/DL
POTASSIUM SERPL-SCNC: 4.4 MMOL/L
SODIUM SERPL-SCNC: 141 MMOL/L

## 2023-04-11 PROCEDURE — C1887: CPT

## 2023-04-11 PROCEDURE — 93458 L HRT ARTERY/VENTRICLE ANGIO: CPT | Mod: 26

## 2023-04-11 PROCEDURE — 93458 L HRT ARTERY/VENTRICLE ANGIO: CPT

## 2023-04-11 PROCEDURE — C1769: CPT

## 2023-04-11 PROCEDURE — C1894: CPT

## 2023-04-11 RX ORDER — SODIUM CHLORIDE 9 MG/ML
250 INJECTION INTRAMUSCULAR; INTRAVENOUS; SUBCUTANEOUS ONCE
Refills: 0 | Status: COMPLETED | OUTPATIENT
Start: 2023-04-11 | End: 2023-04-11

## 2023-04-11 RX ORDER — INSULIN GLARGINE 100 [IU]/ML
50 INJECTION, SOLUTION SUBCUTANEOUS
Qty: 0 | Refills: 0 | DISCHARGE

## 2023-04-11 RX ORDER — INSULIN ASPART 100 [IU]/ML
7 INJECTION, SOLUTION SUBCUTANEOUS
Qty: 0 | Refills: 0 | DISCHARGE

## 2023-04-11 RX ORDER — SODIUM CHLORIDE 9 MG/ML
1000 INJECTION INTRAMUSCULAR; INTRAVENOUS; SUBCUTANEOUS
Refills: 0 | Status: DISCONTINUED | OUTPATIENT
Start: 2023-04-11 | End: 2023-04-11

## 2023-04-11 RX ADMIN — SODIUM CHLORIDE 250 MILLILITER(S): 9 INJECTION INTRAMUSCULAR; INTRAVENOUS; SUBCUTANEOUS at 10:57

## 2023-04-11 RX ADMIN — SODIUM CHLORIDE 160 MILLILITER(S): 9 INJECTION INTRAMUSCULAR; INTRAVENOUS; SUBCUTANEOUS at 13:15

## 2023-04-11 NOTE — PROGRESS NOTE ADULT - SUBJECTIVE AND OBJECTIVE BOX
Nurse Practitioner Progress note:     HPI:  53 y/o F with PMHx of T1DM, MI 2010/2012 ( RCA/LCX), HTN presented to cardiology with c/o CP/SOB with exertion. Was seen at [facility] ED for this episode and released for no EKG changes, neg CE. Able to resume light activity with no resumption of symptoms. Given hx of CAD and T1DM patient referred for cardiac cath to further evaluate  (10 Apr 2023 09:28)          Vital Signs  T(C): 36.4 (04-11-23 @ 10:14), Max: 36.4 (04-11-23 @ 10:14)  HR: 78 (04-11-23 @ 12:40) (78 - 85)  BP: 154/70 (04-11-23 @ 12:40) (137/63 - 154/70)  RR: 15 (04-11-23 @ 12:40) (15 - 16)  SpO2: 100% (04-11-23 @ 12:40) (100% - 100%)  Wt(kg): --        PHYSICAL EXAM:  NEURO: Non-focal, AxOx3.  No neuro deficits   CHEST/LUNG: Clear to auscultation bilaterally; No wheeze  HEART: s1 s2 Regular rate and rhythm; No murmurs, rubs, or gallops  ABDOMEN: Soft, Nontender, Nondistended; Bowel sounds present X 4 quadrants   EXTREMITIES:  2+ Peripheral Pulses, No clubbing, cyanosis, or edema   VASCULAR: Peripheral pulses palpable 2+ bilaterally  PROCEDURE SITE: RFA accessed, sheath removed in recovery room by RN. Site is without hematoma or bleeding. Sensation and AYDE intact. Distal pulses palpable 2+, capillary refill < 2 seconds. Patient denies pain, numbness, tingling, CP or SOB. Clean dry dressing applied     PROCEDURE RESULTS: < from: Cardiac Catheterization (04.11.23 @ 12:21) >   Impression     Diagnostic Conclusions     3 vessel disease in a Type 1 diabetic     Recommendations     CV surgery evaluation    Estimated Blood Loss:5ml.     Signatures     ----------------------------------------------------------------   Electronically signed by Mike Shipman MD(Performing   Physician) on 04/11/2023 12:43   ----------------------------------------------------------------    < end of copied text >

## 2023-04-11 NOTE — ASU PATIENT PROFILE, ADULT - VISION (WITH CORRECTIVE LENSES IF THE PATIENT USUALLY WEARS THEM):
wearing glasses/Normal vision: sees adequately in most situations; can see medication labels, newsprint

## 2023-04-11 NOTE — PROGRESS NOTE ADULT - ASSESSMENT
53 y/o F with PMHx of T1DM, MI 2010/2012 ( RCA/LCX), HTN presented to cardiology with c/o CP/SOB with exertion. Was seen at [facility] ED for this episode and released for no EKG changes, neg CE. Able to resume light activity with no resumption of symptoms. Given hx of CAD and T1DM patient referred for cardiac cath to further evaluate  (10 Apr 2023 09:28)    s/p LHC revealing triple vessel diease     PLAN:  -VS, diet, activity as per post cath orders  - IVF per KANE protocol   -Encourage PO fluids  -Continue current medications  -Post cath instructions reviewed, patient verbalizes and understands instructions  -Discussed therapeutic lifestyle changes to reduce risk factors such as following a cardiac diet, weight loss, maintaining a healthy weight, exercise, smoking cessation, medication compliance, and regular follow-up  with PCP/Cardioloigst  - Patient to be discharged home, recommend following up with CT sx eval with Dr. Tirado  -Plan of care discussed with patient, RN and Dr. Shipman

## 2023-04-11 NOTE — PACU DISCHARGE NOTE - COMMENTS
Patient s/p LHC via Right Femoral artery. Right groin dressing is clean, dry and intact. No s/s of bleeding or hematoma. RLE warm and mobile. Dressing to RUE is intact and without s/s of bleeding or hematoma. VS Stable. Patient denies pain. Discharge instructions reviewed with patient and patient verbalizes understanding. Sl removed for discharge home. Patient pending transport to the [facility] at this time to meet her  for transport home

## 2023-04-12 DIAGNOSIS — I25.110 ATHEROSCLEROTIC HEART DISEASE OF NATIVE CORONARY ARTERY WITH UNSTABLE ANGINA PECTORIS: ICD-10-CM

## 2023-04-14 ENCOUNTER — APPOINTMENT (OUTPATIENT)
Dept: CARDIOTHORACIC SURGERY | Facility: CLINIC | Age: 53
End: 2023-04-14
Payer: MEDICAID

## 2023-04-14 VITALS
BODY MASS INDEX: 28.93 KG/M2 | HEIGHT: 66 IN | HEART RATE: 74 BPM | RESPIRATION RATE: 16 BRPM | TEMPERATURE: 99.6 F | SYSTOLIC BLOOD PRESSURE: 148 MMHG | DIASTOLIC BLOOD PRESSURE: 80 MMHG | WEIGHT: 180 LBS | OXYGEN SATURATION: 97 %

## 2023-04-14 LAB — PA ADP PRP-ACNC: 151 PRU

## 2023-04-14 PROCEDURE — 99204 OFFICE O/P NEW MOD 45 MIN: CPT

## 2023-04-14 RX ORDER — ERGOCALCIFEROL 1.25 MG/1
1.25 MG CAPSULE, LIQUID FILLED ORAL
Qty: 4 | Refills: 0 | Status: COMPLETED | COMMUNITY
Start: 2017-11-20 | End: 2023-04-14

## 2023-04-14 RX ORDER — COVID-19 ANTIGEN TEST
KIT MISCELLANEOUS
Qty: 1 | Refills: 0 | Status: COMPLETED | COMMUNITY
Start: 2022-09-22 | End: 2023-04-14

## 2023-04-14 RX ORDER — INSULIN GLARGINE 300 U/ML
300 INJECTION, SOLUTION SUBCUTANEOUS
Qty: 6 | Refills: 0 | Status: COMPLETED | COMMUNITY
Start: 2017-12-29 | End: 2023-04-14

## 2023-04-14 RX ORDER — FLASH GLUCOSE SCANNING READER
EACH MISCELLANEOUS
Qty: 1 | Refills: 0 | Status: COMPLETED | COMMUNITY
Start: 2021-06-21 | End: 2023-04-14

## 2023-04-14 RX ORDER — MONTELUKAST 10 MG/1
10 TABLET, FILM COATED ORAL
Qty: 30 | Refills: 2 | Status: COMPLETED | COMMUNITY
Start: 2019-08-30 | End: 2023-04-14

## 2023-04-14 RX ORDER — FLUTICASONE PROPIONATE 50 UG/1
50 SPRAY, METERED NASAL DAILY
Qty: 9.9 | Refills: 3 | Status: COMPLETED | COMMUNITY
Start: 2019-08-30 | End: 2023-04-14

## 2023-04-14 RX ORDER — NITROGLYCERIN 0.4 MG/1
0.4 TABLET SUBLINGUAL
Qty: 25 | Refills: 3 | Status: COMPLETED | COMMUNITY
Start: 2020-04-13 | End: 2023-04-14

## 2023-04-14 RX ORDER — ASPIRIN ENTERIC COATED TABLETS 81 MG 81 MG/1
81 TABLET, DELAYED RELEASE ORAL DAILY
Qty: 60 | Refills: 0 | Status: ACTIVE | COMMUNITY
Start: 2023-04-14 | End: 1900-01-01

## 2023-04-14 RX ORDER — PEN NEEDLE, DIABETIC 29 G X1/2"
32G X 4 MM NEEDLE, DISPOSABLE MISCELLANEOUS
Qty: 6 | Refills: 0 | Status: COMPLETED | COMMUNITY
Start: 2019-10-22 | End: 2023-04-14

## 2023-04-14 RX ORDER — SEMAGLUTIDE 1.34 MG/ML
2 INJECTION, SOLUTION SUBCUTANEOUS
Qty: 1 | Refills: 0 | Status: COMPLETED | COMMUNITY
Start: 2021-09-10 | End: 2023-04-14

## 2023-04-14 RX ORDER — BLOOD SUGAR DIAGNOSTIC
STRIP MISCELLANEOUS 4 TIMES DAILY
Qty: 6 | Refills: 3 | Status: COMPLETED | COMMUNITY
Start: 2019-12-30 | End: 2023-04-14

## 2023-04-14 RX ORDER — FLASH GLUCOSE SENSOR
KIT MISCELLANEOUS
Qty: 6 | Refills: 3 | Status: COMPLETED | COMMUNITY
Start: 2021-06-21 | End: 2023-04-14

## 2023-04-14 NOTE — END OF VISIT
[FreeTextEntry3] : \par I personally scribed for ROSANNA GONZALEZ on Apr 14 2023  1:30PM . \par \par \par \par \par Physician Attestation:\par Documented by ORION CHANDLER acting as a scribe for ROSANNA GONZALEZ 04/14/2023 . \par                 All medical record entries made by the Scribe were at my, ROSANNA GONZALEZ , direction and personally dictated by me on 04/14/2023 . I have reviewed the chart and agree that the record accurately reflects my personal performance of the history, physical exam, assessment and plan\par \par \par

## 2023-04-14 NOTE — HISTORY OF PRESENT ILLNESS
[FreeTextEntry1] : Ms. BLANCO is a 52 year old female with  past medical history of Hypertension, T1DM, GERD, Hypercholesteremia, Hashimoto's thyroiditis, Current smoker ( smokes only when she goes to a party / 40 yrs) , Hypothyroidism, Acute MI in  (RCA stent ), ACS in  with complaints of fatigue, chest pain  which relieves with Tums since 2019, dizziness for past 2 weeks  . She also reports numbness and tingling to bilateral feet.  She has one episode of chest pain to upper chest and back  last week and was seen in [facility] ED, no intervention done .After this episode she went to see  and subsequently underwent  23 Cardiac Catheterization revealed EF 50%,  Mid LAD: 90% stenosis.1st Ob Sri% stenosis. Prox RCA: 100% stenosis. She is referred by Dr. Mike Shipman for initial evaluation and management for CAD. Denies any shortness of breath, palpitations, dizziness or pedal edema. \par \par

## 2023-04-14 NOTE — DATA REVIEWED
[FreeTextEntry1] : 23 Cardiac Catheterization revealed EF 50%,  Mid LAD: 90% stenosis.1st Ob Sri% stenosis.\par  Prox RCA: 100% stenosis.\par \par 22 TTE revealed Normal mitral valve. Endocardium not well visualized; grossly normal left\par ventricular systolic function. Normal left ventricular diastolic function. Normal right ventricular size and function.\par \par \par

## 2023-04-14 NOTE — ASSESSMENT
[FreeTextEntry1] : Ms. BLANCO is a 52 year old female with  past medical history of Hypertension, T1DM, GERD, Hypercholesteremia, Hashimoto's thyroiditis, Current smoker ( smokes only when she goes to a party / 40 yrs) , Hypothyroidism, Acute MI in 2010 (RCA stent ), ACS in 2012 with complaints of fatigue, chest pain  which relieves with Tums since 2019, dizziness for past 2 weeks  . She also reports numbness and tingling to bilateral feet.  She has one episode of chest pain to upper chest and back  last week and was seen in [facility] ED, no intervention done .After this episode she went to see  and subsequently underwent  23 Cardiac Catheterization revealed EF 50%,  Mid LAD: 90% stenosis.1st Ob Sri% stenosis. Prox RCA: 100% stenosis. She is referred by Dr. Mike Shipman for initial evaluation and management for CAD. Denies any shortness of breath, palpitations, dizziness or pedal edema. \par \par \par Dr.James Tirado reviewed the cardiac imaging, medical records and reports with patient and discussed the case. 23 Cardiac Catheterization revealed EF 50%,  Mid LAD: 90% stenosis.1st Ob Sri% stenosis.\par  Prox RCA: 100% stenosis.\par \par 22 TTE revealed Normal mitral valve. Endocardium not well visualized; grossly normal left\par ventricular systolic function. Normal left ventricular diastolic function. Normal right ventricular size and function.\par \par Dr.James Tirado  discussed the risks , benefits and alternatives to surgery. Risks included but not limited to  bleeding , stroke, Myocardial Infarction, kidney problems,Blood transfusion ,permanent  pacemaker implantation,  infections and death. Dr.James Tirado  quoted a (2 - 3%) operative mortality and complication risks. Dr.James Tirado  also discussed the various approaches in detail.Dr.James Tirado  feel that the patient will benefit and is a candidate for a Coronary Artery bypass grafting . All questions and concerns were addressed and patient agrees to proceed with  surgery on 23. \par \par \par Plan:\par 1) Coronary Artery bypass grafting on 23 \par 2) PST \par 3) Stop Plavix today \par 4) Start Aspirin 81 mg daily \par 5) P2 Y12 \par 6) May return to clinic on PRN basis \par

## 2023-04-14 NOTE — PHYSICAL EXAM
[General Appearance - Alert] : alert [General Appearance - In No Acute Distress] : in no acute distress [General Appearance - Well Nourished] : well nourished [General Appearance - Well Developed] : well developed [Sclera] : the sclera and conjunctiva were normal [PERRL With Normal Accommodation] : pupils were equal in size, round, and reactive to light [Outer Ear] : the ears and nose were normal in appearance [Hearing Threshold Finger Rub Not [place]] : hearing was normal [Both Tympanic Membranes Were Examined] : both tympanic membranes were normal [Neck Appearance] : the appearance of the neck was normal [] : no respiratory distress [Respiration, Rhythm And Depth] : normal respiratory rhythm and effort [Auscultation Breath Sounds / Voice Sounds] : lungs were clear to auscultation bilaterally [Apical Impulse] : the apical impulse was normal [Heart Rate And Rhythm] : heart rate was normal and rhythm regular [Heart Sounds] : normal S1 and S2 [Murmurs] : no murmurs [Examination Of The Chest] : the chest was normal in appearance [2+] : left 2+ [Breast Appearance] : normal in appearance [Bowel Sounds] : normal bowel sounds [Abdomen Soft] : soft [No CVA Tenderness] : no ~M costovertebral angle tenderness [Abnormal Walk] : normal gait [Involuntary Movements] : no involuntary movements were seen [Skin Color & Pigmentation] : normal skin color and pigmentation [Skin Turgor] : normal skin turgor [No Focal Deficits] : no focal deficits [Oriented To Time, Place, And Person] : oriented to person, place, and time [Impaired Insight] : insight and judgment were intact [Affect] : the affect was normal [Mood] : the mood was normal [Memory Recent] : recent memory was not impaired [Memory Remote] : remote memory was not impaired [FreeTextEntry1] : Deferred

## 2023-04-14 NOTE — CONSULT LETTER
[Dear  ___] : Dear  [unfilled], [FreeTextEntry2] : Dr. Mike Shipman  [FreeTextEntry1] : I had the pleasure of seeing your patient, GORDON BLANCO,in my office today. \par \par We take a multidisciplinary team approach to patient care and consider you, the referring physician, an extension of our team. We will maintain an open line of communication with you throughout your patient's treatment course. \par \par She  is being evaluated for CAD . I have reviewed all of the patient's medical records and diagnostic images at the time of her  office consultation. I have enclosed a copy for your records. \par \par 23 Cardiac Catheterization revealed EF 50%,  Mid LAD: 90% stenosis.1st Ob Sri% stenosis. Prox RCA: 100% stenosis.\par \par 22 TTE revealed Normal mitral valve. Endocardium not well visualized; grossly normal left ventricular systolic function. Normal left ventricular diastolic function. Normal right ventricular size and function.\par \par I have reviewed the indications for surgery and anatomy of the heart. The patient meets criteria for surgery. I have recommended that the patient is a candidate for a Coronary Artery bypass grafting . \par \par Surgery is scheduled for Coronary Artery bypass grafting  on 23 .  I will update you on her perioperative status and disposition upon discharge. \par \par I appreciate the opportunity to care for your patient at the  [facility] based at [place]. If there are any questions or concerns, please call me  at [phone].\par \par Please see my note below. \par \par Sincerely, \par \par \par \par \par Kam Tirado MD\par Director\par [facility]\par Professor \par Cardiovascular & Thoracic Surgery\par [facility] [employer].\par \par \par [facility]:\par Department of Cardiovascular and Thoracic Surgery\par [address], [place], [address]\par Office: [phone]\par Fax: [phone]\par \par Zeinab Bess\par  \par Department of Cardiovascular and Thoracic Surgery\[facility] [address], [place], [address]\par Phone: [phone]\par Office: [phone]\par \par \par \par \par \par

## 2023-04-14 NOTE — REVIEW OF SYSTEMS
[Feeling Tired] : feeling tired [Chest Pain] : chest pain [Dizziness] : dizziness [Negative] : Heme/Lymph

## 2023-04-17 ENCOUNTER — OUTPATIENT (OUTPATIENT)
Dept: OUTPATIENT SERVICES | Facility: HOSPITAL | Age: 53
LOS: 1 days | End: 2023-04-17
Payer: MEDICAID

## 2023-04-17 ENCOUNTER — APPOINTMENT (OUTPATIENT)
Dept: ULTRASOUND IMAGING | Facility: HOSPITAL | Age: 53
End: 2023-04-17

## 2023-04-17 VITALS
HEART RATE: 73 BPM | HEIGHT: 66 IN | RESPIRATION RATE: 17 BRPM | SYSTOLIC BLOOD PRESSURE: 149 MMHG | WEIGHT: 188.05 LBS | DIASTOLIC BLOOD PRESSURE: 85 MMHG | TEMPERATURE: 99 F | OXYGEN SATURATION: 100 %

## 2023-04-17 DIAGNOSIS — Z98.890 OTHER SPECIFIED POSTPROCEDURAL STATES: Chronic | ICD-10-CM

## 2023-04-17 DIAGNOSIS — E10.9 TYPE 1 DIABETES MELLITUS WITHOUT COMPLICATIONS: ICD-10-CM

## 2023-04-17 DIAGNOSIS — I25.10 ATHEROSCLEROTIC HEART DISEASE OF NATIVE CORONARY ARTERY WITHOUT ANGINA PECTORIS: ICD-10-CM

## 2023-04-17 DIAGNOSIS — Z01.818 ENCOUNTER FOR OTHER PREPROCEDURAL EXAMINATION: ICD-10-CM

## 2023-04-17 PROCEDURE — 71046 X-RAY EXAM CHEST 2 VIEWS: CPT | Mod: 26

## 2023-04-17 PROCEDURE — 93880 EXTRACRANIAL BILAT STUDY: CPT | Mod: 26

## 2023-04-17 RX ORDER — CLOPIDOGREL BISULFATE 75 MG/1
1 TABLET, FILM COATED ORAL
Qty: 0 | Refills: 0 | DISCHARGE

## 2023-04-17 RX ORDER — CEFUROXIME AXETIL 250 MG
1500 TABLET ORAL ONCE
Refills: 0 | Status: DISCONTINUED | OUTPATIENT
Start: 2023-04-20 | End: 2023-04-20

## 2023-04-17 NOTE — H&P PST ADULT - NSICDXPASTMEDICALHX_GEN_ALL_CORE_FT
PAST MEDICAL HISTORY:  2019 novel coronavirus disease (COVID-19)     Anemia due to blood loss Menorrhagia- 2017 June - 2 U PRBC Placed, Mirena IUD placement 6/2017    CAD (Coronary Artery Disease)     Cervical dysplasia     Essential Hypertension     History of Hypothyroidism     Hypercholesteremia     Orthostatic dizziness     ST elevation myocardial infarction involving right coronary artery 6/26/2010- S/P 2 STENTS KELSEY    Stented coronary artery 2010 x 2 - KELSEY, 2012 X1 KELSEY    Type 1 diabetes mellitus, with long-term current use of insulin age    7    Vitamin D deficiency

## 2023-04-17 NOTE — H&P PST ADULT - ASSESSMENT
DASI: wash dishes . light housework Mets 5  Symptoms : Denies SOB, GIRALDO, palpitations  Airway : no airway abnormalities , denies prior anesthesia complications   Mallampati : 3  Denies loose teeth    Corneal  abrasion risk : Denies     CAPRINI SCORE [CLOT]    AGE RELATED RISK FACTORS                                                       MOBILITY RELATED FACTORS  [ x] Age 41-60 years                                            (1 Point)                  [ ] Bed rest                                                        (1 Point)  [ ] Age: 61-74 years                                           (2 Points)                 [ ] Plaster cast                                                   (2 Points)  [ ] Age= 75 years                                              (3 Points)                 [ ] Bed bound for more than 72 hours                 (2 Points)    DISEASE RELATED RISK FACTORS                                               GENDER SPECIFIC FACTORS  [ ] Edema in the lower extremities                       (1 Point)                  [ ] Pregnancy                                                     (1 Point)  [ ] Varicose veins                                               (1 Point)                  [ ] Post-partum < 6 weeks                                   (1 Point)             [ x] BMI > 25 Kg/m2                                            (1 Point)                  [ ] Hormonal therapy  or oral contraception          (1 Point)                 [ ] Sepsis (in the previous month)                        (1 Point)                  [ ] History of pregnancy complications                 (1 point)  [ ] Pneumonia or serious lung disease                                               [ ] Unexplained or recurrent                     (1 Point)           (in the previous month)                               (1 Point)  [ ] Abnormal pulmonary function test                     (1 Point)                 SURGERY RELATED RISK FACTORS  [ ] Acute myocardial infarction                              (1 Point)                 [ ]  Section                                             (1 Point)  [ ] Congestive heart failure (in the previous month)  (1 Point)               [ ] Minor surgery                                                  (1 Point)   [ ] Inflammatory bowel disease                             (1 Point)                 [ ] Arthroscopic surgery                                        (2 Points)  [ ] Central venous access                                      (2 Points)                [x ] General surgery lasting more than 45 minutes   (2 Points)       [ ] Stroke (in the previous month)                          (5 Points)               [ ] Elective arthroplasty                                         (5 Points)                                                                                                                                               HEMATOLOGY RELATED FACTORS                                                 TRAUMA RELATED RISK FACTORS  [ ] Prior episodes of VTE                                     (3 Points)                [ ] Fracture of the hip, pelvis, or leg                       (5 Points)  [ ] Positive family history for VTE                         (3 Points)                 [ ] Acute spinal cord injury (in the previous month)  (5 Points)  [ ] Prothrombin 66471 A                                     (3 Points)                 [ ] Paralysis  (less than 1 month)                             (5 Points)  [ ] Factor V Leiden                                             (3 Points)                  [ ] Multiple Trauma within 1 month                        (5 Points)  [ ] Lupus anticoagulants                                     (3 Points)                                                           [ ] Anticardiolipin antibodies                               (3 Points)                                                       [ ] High homocysteine in the blood                      (3 Points)                                             [ ] Other congenital or acquired thrombophilia      (3 Points)                                                [ ] Heparin induced thrombocytopenia                  (3 Points)                                          Total Score [     4     ]    Caprini Score 0 - 2:  Low Risk, No VTE Prophylaxis required for most patients, encourage ambulation  Caprini Score 3 - 6:  At Risk, pharmacologic VTE prophylaxis is indicated for most patients (in the absence of a contraindication)  Caprini Score Greater than or = 7:  High Risk, pharmacologic VTE prophylaxis is indicated for most patients (in the absence of a contraindication)

## 2023-04-17 NOTE — H&P PST ADULT - HISTORY OF PRESENT ILLNESS
52 year old female with hx of IDDM  since age 7, HTN, CAD s/p  cardiac stents. Has been experiencing "heartburn, dizziness with minimal activity evaluation by cardiology referred for cardiac cath.  Results revealed triple vessel disease referred for bypass.     *Pt c/o dizziness , lightheaded at PST , we called Dr Tirado (surgeon) . He spoke with patient. Pt can go home today after PST.  We instructed if she has heartburn, dizziness, lightheaded at home call surgeon office and or go to ER.    *CAD on Plavix instructed  by surgeon to stop.  Last dose 4/13 begun on aspirin.    *Diabetes  Basaglar usually takes 12 units at night pt states in past when this was decreased her morning sugar was high to take 12 units night before  surgery  usually takes 34 units in am. To take 28 units am of surgery  fs on arrival

## 2023-04-17 NOTE — H&P PST ADULT - NSICDXPASTSURGICALHX_GEN_ALL_CORE_FT
PAST SURGICAL HISTORY:  H/O dilation and curettage     History of hysteroscopy     S/P coronary artery stent placement 2010 x 2, 2012 x 1

## 2023-04-17 NOTE — H&P PST ADULT - NSICDXPROCEDURE_GEN_ALL_CORE_FT
PROCEDURES:  Creation of coronary artery bypass with internal mammary artery and saphenous vein graft 17-Apr-2023 14:40:51  Katelyn Eng

## 2023-04-20 ENCOUNTER — INPATIENT (INPATIENT)
Facility: HOSPITAL | Age: 53
LOS: 3 days | Discharge: HOME HEALTH | DRG: 236 | End: 2023-04-24
Attending: THORACIC SURGERY (CARDIOTHORACIC VASCULAR SURGERY) | Admitting: THORACIC SURGERY (CARDIOTHORACIC VASCULAR SURGERY)
Payer: MEDICAID

## 2023-04-20 ENCOUNTER — APPOINTMENT (OUTPATIENT)
Dept: CARDIOTHORACIC SURGERY | Facility: HOSPITAL | Age: 53
End: 2023-04-20

## 2023-04-20 VITALS
SYSTOLIC BLOOD PRESSURE: 124 MMHG | OXYGEN SATURATION: 100 % | HEART RATE: 81 BPM | RESPIRATION RATE: 18 BRPM | HEIGHT: 66 IN | WEIGHT: 191.14 LBS | TEMPERATURE: 98 F | DIASTOLIC BLOOD PRESSURE: 75 MMHG

## 2023-04-20 DIAGNOSIS — Z98.890 OTHER SPECIFIED POSTPROCEDURAL STATES: Chronic | ICD-10-CM

## 2023-04-20 DIAGNOSIS — I25.10 ATHEROSCLEROTIC HEART DISEASE OF NATIVE CORONARY ARTERY WITHOUT ANGINA PECTORIS: ICD-10-CM

## 2023-04-20 LAB
ALBUMIN SERPL ELPH-MCNC: 3.7 G/DL — SIGNIFICANT CHANGE UP (ref 3.3–5)
ALP SERPL-CCNC: 61 U/L — SIGNIFICANT CHANGE UP (ref 40–120)
ALT FLD-CCNC: 22 U/L — SIGNIFICANT CHANGE UP (ref 10–45)
ANION GAP SERPL CALC-SCNC: 11 MMOL/L — SIGNIFICANT CHANGE UP (ref 5–17)
APTT BLD: 27.6 SEC — SIGNIFICANT CHANGE UP (ref 27.5–35.5)
AST SERPL-CCNC: 35 U/L — SIGNIFICANT CHANGE UP (ref 10–40)
BASE EXCESS BLDV CALC-SCNC: -2.2 MMOL/L — LOW (ref -2–3)
BASE EXCESS BLDV CALC-SCNC: 1.3 MMOL/L — SIGNIFICANT CHANGE UP (ref -2–3)
BASE EXCESS BLDV CALC-SCNC: 2.4 MMOL/L — SIGNIFICANT CHANGE UP (ref -2–3)
BASOPHILS # BLD AUTO: 0.05 K/UL — SIGNIFICANT CHANGE UP (ref 0–0.2)
BASOPHILS NFR BLD AUTO: 0.3 % — SIGNIFICANT CHANGE UP (ref 0–2)
BILIRUB SERPL-MCNC: 0.6 MG/DL — SIGNIFICANT CHANGE UP (ref 0.2–1.2)
BLOOD GAS VENOUS - CREATININE: SIGNIFICANT CHANGE UP MG/DL (ref 0.5–1.3)
BUN SERPL-MCNC: 12 MG/DL — SIGNIFICANT CHANGE UP (ref 7–23)
CA-I SERPL-SCNC: 0.94 MMOL/L — LOW (ref 1.15–1.33)
CA-I SERPL-SCNC: 0.98 MMOL/L — LOW (ref 1.15–1.33)
CA-I SERPL-SCNC: 1.02 MMOL/L — LOW (ref 1.15–1.33)
CALCIUM SERPL-MCNC: 7.9 MG/DL — LOW (ref 8.4–10.5)
CHLORIDE BLDV-SCNC: 104 MMOL/L — SIGNIFICANT CHANGE UP (ref 96–108)
CHLORIDE BLDV-SCNC: 104 MMOL/L — SIGNIFICANT CHANGE UP (ref 96–108)
CHLORIDE BLDV-SCNC: 105 MMOL/L — SIGNIFICANT CHANGE UP (ref 96–108)
CHLORIDE SERPL-SCNC: 105 MMOL/L — SIGNIFICANT CHANGE UP (ref 96–108)
CK MB BLD-MCNC: 9.1 % — HIGH (ref 0–3.5)
CK MB CFR SERPL CALC: 24.5 NG/ML — HIGH (ref 0–3.8)
CK SERPL-CCNC: 268 U/L — HIGH (ref 25–170)
CO2 BLDV-SCNC: 25 MMOL/L — SIGNIFICANT CHANGE UP (ref 22–26)
CO2 BLDV-SCNC: 27 MMOL/L — HIGH (ref 22–26)
CO2 BLDV-SCNC: 29 MMOL/L — HIGH (ref 22–26)
CO2 SERPL-SCNC: 24 MMOL/L — SIGNIFICANT CHANGE UP (ref 22–31)
CREAT SERPL-MCNC: 0.49 MG/DL — LOW (ref 0.5–1.3)
EGFR: 113 ML/MIN/1.73M2 — SIGNIFICANT CHANGE UP
EOSINOPHIL # BLD AUTO: 0.15 K/UL — SIGNIFICANT CHANGE UP (ref 0–0.5)
EOSINOPHIL NFR BLD AUTO: 0.8 % — SIGNIFICANT CHANGE UP (ref 0–6)
FIBRINOGEN PPP-MCNC: 182 MG/DL — LOW (ref 200–445)
GAS PNL BLDA: SIGNIFICANT CHANGE UP
GAS PNL BLDV: 136 MMOL/L — SIGNIFICANT CHANGE UP (ref 136–145)
GAS PNL BLDV: 137 MMOL/L — SIGNIFICANT CHANGE UP (ref 136–145)
GAS PNL BLDV: 137 MMOL/L — SIGNIFICANT CHANGE UP (ref 136–145)
GAS PNL BLDV: SIGNIFICANT CHANGE UP
GLUCOSE BLDC GLUCOMTR-MCNC: 101 MG/DL — HIGH (ref 70–99)
GLUCOSE BLDC GLUCOMTR-MCNC: 112 MG/DL — HIGH (ref 70–99)
GLUCOSE BLDC GLUCOMTR-MCNC: 149 MG/DL — HIGH (ref 70–99)
GLUCOSE BLDC GLUCOMTR-MCNC: 169 MG/DL — HIGH (ref 70–99)
GLUCOSE BLDC GLUCOMTR-MCNC: 181 MG/DL — HIGH (ref 70–99)
GLUCOSE BLDC GLUCOMTR-MCNC: 191 MG/DL — HIGH (ref 70–99)
GLUCOSE BLDC GLUCOMTR-MCNC: 93 MG/DL — SIGNIFICANT CHANGE UP (ref 70–99)
GLUCOSE BLDC GLUCOMTR-MCNC: 99 MG/DL — SIGNIFICANT CHANGE UP (ref 70–99)
GLUCOSE BLDV-MCNC: 155 MG/DL — HIGH (ref 70–99)
GLUCOSE BLDV-MCNC: 181 MG/DL — HIGH (ref 70–99)
GLUCOSE BLDV-MCNC: 192 MG/DL — HIGH (ref 70–99)
GLUCOSE SERPL-MCNC: 148 MG/DL — HIGH (ref 70–99)
HCO3 BLDV-SCNC: 24 MMOL/L — SIGNIFICANT CHANGE UP (ref 22–29)
HCO3 BLDV-SCNC: 26 MMOL/L — SIGNIFICANT CHANGE UP (ref 22–29)
HCO3 BLDV-SCNC: 27 MMOL/L — SIGNIFICANT CHANGE UP (ref 22–29)
HCT VFR BLD CALC: 32.3 % — LOW (ref 34.5–45)
HCT VFR BLDA CALC: 21 % — CRITICAL LOW (ref 34.5–46.5)
HCT VFR BLDA CALC: 24 % — LOW (ref 34.5–46.5)
HCT VFR BLDA CALC: 26 % — LOW (ref 34.5–46.5)
HGB BLD CALC-MCNC: 6.9 G/DL — CRITICAL LOW (ref 11.7–16.1)
HGB BLD CALC-MCNC: 7.9 G/DL — LOW (ref 11.7–16.1)
HGB BLD CALC-MCNC: 8.5 G/DL — LOW (ref 11.7–16.1)
HGB BLD-MCNC: 11 G/DL — LOW (ref 11.5–15.5)
IMM GRANULOCYTES NFR BLD AUTO: 1.3 % — HIGH (ref 0–0.9)
INR BLD: 1.22 RATIO — HIGH (ref 0.88–1.16)
LACTATE BLDV-MCNC: 1.2 MMOL/L — SIGNIFICANT CHANGE UP (ref 0.5–2)
LACTATE BLDV-MCNC: 1.3 MMOL/L — SIGNIFICANT CHANGE UP (ref 0.5–2)
LACTATE BLDV-MCNC: 1.6 MMOL/L — SIGNIFICANT CHANGE UP (ref 0.5–2)
LYMPHOCYTES # BLD AUTO: 10.8 % — LOW (ref 13–44)
LYMPHOCYTES # BLD AUTO: 2.09 K/UL — SIGNIFICANT CHANGE UP (ref 1–3.3)
MAGNESIUM SERPL-MCNC: 2.6 MG/DL — SIGNIFICANT CHANGE UP (ref 1.6–2.6)
MCHC RBC-ENTMCNC: 31.9 PG — SIGNIFICANT CHANGE UP (ref 27–34)
MCHC RBC-ENTMCNC: 34.1 GM/DL — SIGNIFICANT CHANGE UP (ref 32–36)
MCV RBC AUTO: 93.6 FL — SIGNIFICANT CHANGE UP (ref 80–100)
MONOCYTES # BLD AUTO: 0.94 K/UL — HIGH (ref 0–0.9)
MONOCYTES NFR BLD AUTO: 4.8 % — SIGNIFICANT CHANGE UP (ref 2–14)
NEUTROPHILS # BLD AUTO: 15.9 K/UL — HIGH (ref 1.8–7.4)
NEUTROPHILS NFR BLD AUTO: 82 % — HIGH (ref 43–77)
NRBC # BLD: 0 /100 WBCS — SIGNIFICANT CHANGE UP (ref 0–0)
PCO2 BLDV: 40 MMHG — SIGNIFICANT CHANGE UP (ref 39–42)
PCO2 BLDV: 43 MMHG — HIGH (ref 39–42)
PCO2 BLDV: 45 MMHG — HIGH (ref 39–42)
PH BLDV: 7.33 — SIGNIFICANT CHANGE UP (ref 7.32–7.43)
PH BLDV: 7.41 — SIGNIFICANT CHANGE UP (ref 7.32–7.43)
PH BLDV: 7.42 — SIGNIFICANT CHANGE UP (ref 7.32–7.43)
PHOSPHATE SERPL-MCNC: 3.3 MG/DL — SIGNIFICANT CHANGE UP (ref 2.5–4.5)
PLATELET # BLD AUTO: 175 K/UL — SIGNIFICANT CHANGE UP (ref 150–400)
PO2 BLDV: 52 MMHG — HIGH (ref 25–45)
PO2 BLDV: 57 MMHG — HIGH (ref 25–45)
PO2 BLDV: 78 MMHG — HIGH (ref 25–45)
POTASSIUM BLDV-SCNC: 4.1 MMOL/L — SIGNIFICANT CHANGE UP (ref 3.5–5.1)
POTASSIUM BLDV-SCNC: 4.3 MMOL/L — SIGNIFICANT CHANGE UP (ref 3.5–5.1)
POTASSIUM BLDV-SCNC: 4.7 MMOL/L — SIGNIFICANT CHANGE UP (ref 3.5–5.1)
POTASSIUM SERPL-MCNC: 4 MMOL/L — SIGNIFICANT CHANGE UP (ref 3.5–5.3)
POTASSIUM SERPL-SCNC: 4 MMOL/L — SIGNIFICANT CHANGE UP (ref 3.5–5.3)
PROT SERPL-MCNC: 5 G/DL — LOW (ref 6–8.3)
PROTHROM AB SERPL-ACNC: 14.1 SEC — HIGH (ref 10.5–13.4)
RBC # BLD: 3.45 M/UL — LOW (ref 3.8–5.2)
RBC # FLD: 13.8 % — SIGNIFICANT CHANGE UP (ref 10.3–14.5)
SAO2 % BLDV: 91.7 % — HIGH (ref 67–88)
SAO2 % BLDV: 92.8 % — HIGH (ref 67–88)
SAO2 % BLDV: 99.4 % — HIGH (ref 67–88)
SODIUM SERPL-SCNC: 140 MMOL/L — SIGNIFICANT CHANGE UP (ref 135–145)
TROPONIN T, HIGH SENSITIVITY RESULT: 371 NG/L — HIGH (ref 0–51)
WBC # BLD: 19.39 K/UL — HIGH (ref 3.8–10.5)
WBC # FLD AUTO: 19.39 K/UL — HIGH (ref 3.8–10.5)

## 2023-04-20 PROCEDURE — 99291 CRITICAL CARE FIRST HOUR: CPT

## 2023-04-20 PROCEDURE — 86900 BLOOD TYPING SEROLOGIC ABO: CPT

## 2023-04-20 PROCEDURE — 33508 ENDOSCOPIC VEIN HARVEST: CPT | Mod: 59

## 2023-04-20 PROCEDURE — 86923 COMPATIBILITY TEST ELECTRIC: CPT

## 2023-04-20 PROCEDURE — 71045 X-RAY EXAM CHEST 1 VIEW: CPT | Mod: 26

## 2023-04-20 PROCEDURE — 86901 BLOOD TYPING SEROLOGIC RH(D): CPT

## 2023-04-20 PROCEDURE — 86850 RBC ANTIBODY SCREEN: CPT

## 2023-04-20 PROCEDURE — 33533 CABG ARTERIAL SINGLE: CPT

## 2023-04-20 PROCEDURE — 93010 ELECTROCARDIOGRAM REPORT: CPT

## 2023-04-20 PROCEDURE — P9016: CPT

## 2023-04-20 PROCEDURE — G0463: CPT

## 2023-04-20 PROCEDURE — 93880 EXTRACRANIAL BILAT STUDY: CPT

## 2023-04-20 PROCEDURE — 71046 X-RAY EXAM CHEST 2 VIEWS: CPT

## 2023-04-20 PROCEDURE — 93312 ECHO TRANSESOPHAGEAL: CPT | Mod: 26

## 2023-04-20 PROCEDURE — 33518 CABG ARTERY-VEIN TWO: CPT

## 2023-04-20 DEVICE — PACING WIRE BLUE DARK 2-0 24" SH SKS-3: Type: IMPLANTABLE DEVICE | Status: FUNCTIONAL

## 2023-04-20 DEVICE — CHEST DRAIN THORACIC ARGYLE PVC 32FR RIGHT ANGLE: Type: IMPLANTABLE DEVICE | Status: FUNCTIONAL

## 2023-04-20 DEVICE — CHEST DRAIN THORACIC ARGYLE PVC 36FR STRAIGHT: Type: IMPLANTABLE DEVICE | Status: FUNCTIONAL

## 2023-04-20 DEVICE — CANNULA IMA 1MM BLUNT TIP: Type: IMPLANTABLE DEVICE | Status: FUNCTIONAL

## 2023-04-20 DEVICE — CANNULA VENOUS 3 STAGE STANDARD 29/37/37FR X 1/2" NON-VENTED: Type: IMPLANTABLE DEVICE | Status: FUNCTIONAL

## 2023-04-20 DEVICE — SURGICEL FIBRILLAR 2 X 4": Type: IMPLANTABLE DEVICE | Status: FUNCTIONAL

## 2023-04-20 DEVICE — KIT A-LINE 1LUM 20G X 12CM SAFE KIT: Type: IMPLANTABLE DEVICE | Status: FUNCTIONAL

## 2023-04-20 DEVICE — CANNULA VENOUS 2 STAGE THIN FLEX 36/46FR X 1/2" WITH RETURN DIAL: Type: IMPLANTABLE DEVICE | Status: FUNCTIONAL

## 2023-04-20 DEVICE — CANNULA ANTEGRADE CARDIOPLEGIA 14 GA STRL: Type: IMPLANTABLE DEVICE | Status: FUNCTIONAL

## 2023-04-20 DEVICE — OCCLUDER INTERNAL VESSEL FLO-RESTER 1 X 12MM: Type: IMPLANTABLE DEVICE | Status: FUNCTIONAL

## 2023-04-20 DEVICE — CANNULA ARTERIAL STRAIGHT 20FR X 3/8" VENTED: Type: IMPLANTABLE DEVICE | Status: FUNCTIONAL

## 2023-04-20 DEVICE — IMPLANTABLE DEVICE: Type: IMPLANTABLE DEVICE | Status: FUNCTIONAL

## 2023-04-20 DEVICE — KIT CVC 2LUM MAC 9FR CHG: Type: IMPLANTABLE DEVICE | Status: FUNCTIONAL

## 2023-04-20 DEVICE — CANNULA VESSEL 3MM BLUNT TIP CLEAR 1-WAY VALVE: Type: IMPLANTABLE DEVICE | Status: FUNCTIONAL

## 2023-04-20 DEVICE — LIGATING CLIPS WECK HORIZON SMALL-WIDE (RED) 24: Type: IMPLANTABLE DEVICE | Status: FUNCTIONAL

## 2023-04-20 DEVICE — INTRODUCER SET MICROPUNCTURE ACCESS 21G X 7CM: Type: IMPLANTABLE DEVICE | Status: FUNCTIONAL

## 2023-04-20 DEVICE — LIGATING CLIPS WECK HORIZON MEDIUM (BLUE) 24: Type: IMPLANTABLE DEVICE | Status: FUNCTIONAL

## 2023-04-20 RX ORDER — PANTOPRAZOLE SODIUM 20 MG/1
40 TABLET, DELAYED RELEASE ORAL DAILY
Refills: 0 | Status: DISCONTINUED | OUTPATIENT
Start: 2023-04-20 | End: 2023-04-21

## 2023-04-20 RX ORDER — SENNA PLUS 8.6 MG/1
2 TABLET ORAL AT BEDTIME
Refills: 0 | Status: DISCONTINUED | OUTPATIENT
Start: 2023-04-21 | End: 2023-04-24

## 2023-04-20 RX ORDER — HYDROMORPHONE HYDROCHLORIDE 2 MG/ML
0.5 INJECTION INTRAMUSCULAR; INTRAVENOUS; SUBCUTANEOUS EVERY 6 HOURS
Refills: 0 | Status: DISCONTINUED | OUTPATIENT
Start: 2023-04-20 | End: 2023-04-21

## 2023-04-20 RX ORDER — MEPERIDINE HYDROCHLORIDE 50 MG/ML
25 INJECTION INTRAMUSCULAR; INTRAVENOUS; SUBCUTANEOUS ONCE
Refills: 0 | Status: DISCONTINUED | OUTPATIENT
Start: 2023-04-20 | End: 2023-04-20

## 2023-04-20 RX ORDER — AMIODARONE HYDROCHLORIDE 400 MG/1
400 TABLET ORAL
Refills: 0 | Status: COMPLETED | OUTPATIENT
Start: 2023-04-20 | End: 2023-04-23

## 2023-04-20 RX ORDER — ASPIRIN/CALCIUM CARB/MAGNESIUM 324 MG
81 TABLET ORAL DAILY
Refills: 0 | Status: DISCONTINUED | OUTPATIENT
Start: 2023-04-20 | End: 2023-04-24

## 2023-04-20 RX ORDER — SODIUM CHLORIDE 9 MG/ML
1000 INJECTION INTRAMUSCULAR; INTRAVENOUS; SUBCUTANEOUS
Refills: 0 | Status: DISCONTINUED | OUTPATIENT
Start: 2023-04-20 | End: 2023-04-24

## 2023-04-20 RX ORDER — ATORVASTATIN CALCIUM 80 MG/1
80 TABLET, FILM COATED ORAL AT BEDTIME
Refills: 0 | Status: DISCONTINUED | OUTPATIENT
Start: 2023-04-20 | End: 2023-04-24

## 2023-04-20 RX ORDER — ACETAMINOPHEN 500 MG
650 TABLET ORAL EVERY 6 HOURS
Refills: 0 | Status: DISCONTINUED | OUTPATIENT
Start: 2023-04-23 | End: 2023-04-24

## 2023-04-20 RX ORDER — DEXTROSE 50 % IN WATER 50 %
25 SYRINGE (ML) INTRAVENOUS
Refills: 0 | Status: DISCONTINUED | OUTPATIENT
Start: 2023-04-20 | End: 2023-04-20

## 2023-04-20 RX ORDER — DEXMEDETOMIDINE HYDROCHLORIDE IN 0.9% SODIUM CHLORIDE 4 UG/ML
0.3 INJECTION INTRAVENOUS
Qty: 200 | Refills: 0 | Status: DISCONTINUED | OUTPATIENT
Start: 2023-04-20 | End: 2023-04-20

## 2023-04-20 RX ORDER — DEXTROSE 50 % IN WATER 50 %
50 SYRINGE (ML) INTRAVENOUS
Refills: 0 | Status: DISCONTINUED | OUTPATIENT
Start: 2023-04-20 | End: 2023-04-24

## 2023-04-20 RX ORDER — POTASSIUM CHLORIDE 20 MEQ
10 PACKET (EA) ORAL
Refills: 0 | Status: DISCONTINUED | OUTPATIENT
Start: 2023-04-20 | End: 2023-04-22

## 2023-04-20 RX ORDER — SODIUM CHLORIDE 9 MG/ML
3 INJECTION INTRAMUSCULAR; INTRAVENOUS; SUBCUTANEOUS EVERY 8 HOURS
Refills: 0 | Status: DISCONTINUED | OUTPATIENT
Start: 2023-04-20 | End: 2023-04-20

## 2023-04-20 RX ORDER — OXYCODONE HYDROCHLORIDE 5 MG/1
10 TABLET ORAL EVERY 4 HOURS
Refills: 0 | Status: DISCONTINUED | OUTPATIENT
Start: 2023-04-20 | End: 2023-04-24

## 2023-04-20 RX ORDER — NICARDIPINE HYDROCHLORIDE 30 MG/1
5 CAPSULE, EXTENDED RELEASE ORAL
Qty: 40 | Refills: 0 | Status: DISCONTINUED | OUTPATIENT
Start: 2023-04-20 | End: 2023-04-21

## 2023-04-20 RX ORDER — ALBUMIN HUMAN 25 %
250 VIAL (ML) INTRAVENOUS ONCE
Refills: 0 | Status: COMPLETED | OUTPATIENT
Start: 2023-04-20 | End: 2023-04-20

## 2023-04-20 RX ORDER — LEVOTHYROXINE SODIUM 125 MCG
137 TABLET ORAL DAILY
Refills: 0 | Status: DISCONTINUED | OUTPATIENT
Start: 2023-04-20 | End: 2023-04-24

## 2023-04-20 RX ORDER — HYDROMORPHONE HYDROCHLORIDE 2 MG/ML
0.5 INJECTION INTRAMUSCULAR; INTRAVENOUS; SUBCUTANEOUS ONCE
Refills: 0 | Status: DISCONTINUED | OUTPATIENT
Start: 2023-04-20 | End: 2023-04-20

## 2023-04-20 RX ORDER — OXYCODONE HYDROCHLORIDE 5 MG/1
5 TABLET ORAL EVERY 4 HOURS
Refills: 0 | Status: DISCONTINUED | OUTPATIENT
Start: 2023-04-20 | End: 2023-04-24

## 2023-04-20 RX ORDER — POLYETHYLENE GLYCOL 3350 17 G/17G
17 POWDER, FOR SOLUTION ORAL DAILY
Refills: 0 | Status: DISCONTINUED | OUTPATIENT
Start: 2023-04-20 | End: 2023-04-24

## 2023-04-20 RX ORDER — CHLORHEXIDINE GLUCONATE 213 G/1000ML
1 SOLUTION TOPICAL DAILY
Refills: 0 | Status: DISCONTINUED | OUTPATIENT
Start: 2023-04-20 | End: 2023-04-24

## 2023-04-20 RX ORDER — POTASSIUM CHLORIDE 20 MEQ
10 PACKET (EA) ORAL
Refills: 0 | Status: COMPLETED | OUTPATIENT
Start: 2023-04-20 | End: 2023-04-20

## 2023-04-20 RX ORDER — SODIUM CHLORIDE 9 MG/ML
500 INJECTION, SOLUTION INTRAVENOUS ONCE
Refills: 0 | Status: COMPLETED | OUTPATIENT
Start: 2023-04-20 | End: 2023-04-20

## 2023-04-20 RX ORDER — CHLORHEXIDINE GLUCONATE 213 G/1000ML
5 SOLUTION TOPICAL
Refills: 0 | Status: DISCONTINUED | OUTPATIENT
Start: 2023-04-20 | End: 2023-04-20

## 2023-04-20 RX ORDER — NOREPINEPHRINE BITARTRATE/D5W 8 MG/250ML
0.02 PLASTIC BAG, INJECTION (ML) INTRAVENOUS
Qty: 8 | Refills: 0 | Status: DISCONTINUED | OUTPATIENT
Start: 2023-04-20 | End: 2023-04-20

## 2023-04-20 RX ORDER — ACETAMINOPHEN 500 MG
1000 TABLET ORAL ONCE
Refills: 0 | Status: COMPLETED | OUTPATIENT
Start: 2023-04-20 | End: 2023-04-20

## 2023-04-20 RX ORDER — GABAPENTIN 400 MG/1
300 CAPSULE ORAL ONCE
Refills: 0 | Status: COMPLETED | OUTPATIENT
Start: 2023-04-20 | End: 2023-04-20

## 2023-04-20 RX ORDER — ACETAMINOPHEN 500 MG
650 TABLET ORAL EVERY 6 HOURS
Refills: 0 | Status: COMPLETED | OUTPATIENT
Start: 2023-04-20 | End: 2023-04-23

## 2023-04-20 RX ORDER — FERROUS SULFATE 325(65) MG
1 TABLET ORAL
Qty: 0 | Refills: 0 | DISCHARGE

## 2023-04-20 RX ORDER — CALCIUM GLUCONATE 100 MG/ML
2 VIAL (ML) INTRAVENOUS ONCE
Refills: 0 | Status: COMPLETED | OUTPATIENT
Start: 2023-04-20 | End: 2023-04-20

## 2023-04-20 RX ORDER — CEFUROXIME AXETIL 250 MG
1500 TABLET ORAL EVERY 8 HOURS
Refills: 0 | Status: COMPLETED | OUTPATIENT
Start: 2023-04-20 | End: 2023-04-22

## 2023-04-20 RX ORDER — CHLORHEXIDINE GLUCONATE 213 G/1000ML
1 SOLUTION TOPICAL ONCE
Refills: 0 | Status: DISCONTINUED | OUTPATIENT
Start: 2023-04-20 | End: 2023-04-20

## 2023-04-20 RX ORDER — ALBUMIN HUMAN 25 %
250 VIAL (ML) INTRAVENOUS
Refills: 0 | Status: COMPLETED | OUTPATIENT
Start: 2023-04-20 | End: 2023-04-20

## 2023-04-20 RX ORDER — INSULIN HUMAN 100 [IU]/ML
3 INJECTION, SOLUTION SUBCUTANEOUS
Qty: 100 | Refills: 0 | Status: DISCONTINUED | OUTPATIENT
Start: 2023-04-20 | End: 2023-04-22

## 2023-04-20 RX ORDER — LIDOCAINE HCL 20 MG/ML
0.2 VIAL (ML) INJECTION ONCE
Refills: 0 | Status: DISCONTINUED | OUTPATIENT
Start: 2023-04-20 | End: 2023-04-20

## 2023-04-20 RX ORDER — MAGNESIUM SULFATE 500 MG/ML
2 VIAL (ML) INJECTION ONCE
Refills: 0 | Status: COMPLETED | OUTPATIENT
Start: 2023-04-20 | End: 2023-04-20

## 2023-04-20 RX ORDER — GABAPENTIN 400 MG/1
100 CAPSULE ORAL EVERY 8 HOURS
Refills: 0 | Status: DISCONTINUED | OUTPATIENT
Start: 2023-04-20 | End: 2023-04-24

## 2023-04-20 RX ORDER — ASCORBIC ACID 60 MG
500 TABLET,CHEWABLE ORAL
Refills: 0 | Status: DISCONTINUED | OUTPATIENT
Start: 2023-04-20 | End: 2023-04-24

## 2023-04-20 RX ORDER — CHLORHEXIDINE GLUCONATE 213 G/1000ML
15 SOLUTION TOPICAL EVERY 12 HOURS
Refills: 0 | Status: DISCONTINUED | OUTPATIENT
Start: 2023-04-20 | End: 2023-04-20

## 2023-04-20 RX ORDER — ASPIRIN/CALCIUM CARB/MAGNESIUM 324 MG
300 TABLET ORAL ONCE
Refills: 0 | Status: DISCONTINUED | OUTPATIENT
Start: 2023-04-20 | End: 2023-04-20

## 2023-04-20 RX ADMIN — Medication 1000 MILLIGRAM(S): at 06:55

## 2023-04-20 RX ADMIN — ATORVASTATIN CALCIUM 80 MILLIGRAM(S): 80 TABLET, FILM COATED ORAL at 22:57

## 2023-04-20 RX ADMIN — GABAPENTIN 300 MILLIGRAM(S): 400 CAPSULE ORAL at 06:55

## 2023-04-20 RX ADMIN — Medication 125 MILLILITER(S): at 14:15

## 2023-04-20 RX ADMIN — Medication 125 MILLILITER(S): at 18:08

## 2023-04-20 RX ADMIN — AMIODARONE HYDROCHLORIDE 400 MILLIGRAM(S): 400 TABLET ORAL at 20:00

## 2023-04-20 RX ADMIN — Medication 81 MILLIGRAM(S): at 20:00

## 2023-04-20 RX ADMIN — HYDROMORPHONE HYDROCHLORIDE 0.5 MILLIGRAM(S): 2 INJECTION INTRAMUSCULAR; INTRAVENOUS; SUBCUTANEOUS at 18:15

## 2023-04-20 RX ADMIN — SODIUM CHLORIDE 2000 MILLILITER(S): 9 INJECTION, SOLUTION INTRAVENOUS at 21:15

## 2023-04-20 RX ADMIN — GABAPENTIN 100 MILLIGRAM(S): 400 CAPSULE ORAL at 22:57

## 2023-04-20 RX ADMIN — Medication 25 GRAM(S): at 15:30

## 2023-04-20 RX ADMIN — Medication 400 MILLIGRAM(S): at 20:00

## 2023-04-20 RX ADMIN — HYDROMORPHONE HYDROCHLORIDE 0.5 MILLIGRAM(S): 2 INJECTION INTRAMUSCULAR; INTRAVENOUS; SUBCUTANEOUS at 18:00

## 2023-04-20 RX ADMIN — Medication 500 MILLIGRAM(S): at 20:01

## 2023-04-20 RX ADMIN — Medication 200 GRAM(S): at 17:30

## 2023-04-20 RX ADMIN — Medication 100 MILLIEQUIVALENT(S): at 17:00

## 2023-04-20 RX ADMIN — Medication 100 MILLIGRAM(S): at 17:00

## 2023-04-20 RX ADMIN — HYDROMORPHONE HYDROCHLORIDE 0.5 MILLIGRAM(S): 2 INJECTION INTRAMUSCULAR; INTRAVENOUS; SUBCUTANEOUS at 23:15

## 2023-04-20 RX ADMIN — Medication 125 MILLILITER(S): at 18:00

## 2023-04-20 RX ADMIN — HYDROMORPHONE HYDROCHLORIDE 0.5 MILLIGRAM(S): 2 INJECTION INTRAMUSCULAR; INTRAVENOUS; SUBCUTANEOUS at 23:00

## 2023-04-20 RX ADMIN — Medication 1000 MILLIGRAM(S): at 20:15

## 2023-04-20 RX ADMIN — Medication 125 MILLILITER(S): at 17:37

## 2023-04-20 RX ADMIN — CHLORHEXIDINE GLUCONATE 15 MILLILITER(S): 213 SOLUTION TOPICAL at 17:20

## 2023-04-20 RX ADMIN — PANTOPRAZOLE SODIUM 40 MILLIGRAM(S): 20 TABLET, DELAYED RELEASE ORAL at 20:01

## 2023-04-20 NOTE — PRE-OP CHECKLIST - DENTURES
From: Kathleen Griffith  To:  Khris Carrera Maryfrances Troy) 0.5 mg tablet Khris Carrera III, DO]    Preferred pharmacy: [id number] Kal Daniel [address]    Comment: no

## 2023-04-20 NOTE — AIRWAY REMOVAL NOTE  ADULT & PEDS - ARTIFICAL AIRWAY REMOVAL COMMENTS
Written order for extubation verified. The patient was identified by full name and birth date compared to the identification band. Present during the procedure was Eliot Palmer and Jayce Pryor RN

## 2023-04-20 NOTE — PROGRESS NOTE ADULT - SUBJECTIVE AND OBJECTIVE BOX
Patient seen and examined at the bedside.    Remained critically ill on continuous ICU monitoring.    OBJECTIVE:  Vital Signs Last 24 Hrs  T(C): 37 (20 Apr 2023 16:00), Max: 37 (20 Apr 2023 16:00)  T(F): 98.6 (20 Apr 2023 16:00), Max: 98.6 (20 Apr 2023 16:00)  HR: 85 (20 Apr 2023 17:15) (75 - 96)  BP: 124/75 (20 Apr 2023 09:00) (124/75 - 124/75)  BP(mean): --  RR: 17 (20 Apr 2023 17:15) (5 - 21)  SpO2: 100% (20 Apr 2023 17:15) (98% - 100%)    Parameters below as of 20 Apr 2023 16:00  Patient On (Oxygen Delivery Method): ventilator    O2 Concentration (%): 40      Physical Exam:  General: intubated multiple lines gtt & tubes   Neurology: sedated   Eyes: bilaeral pupils equal and reactive   ENT/Neck: +ETT midline, Neck supple, trachea midline, No JVD   Respiratory: Rales noted bilaterally   CV: RRR, S1S2, no murmurs        [x] Sternal dressing, [x] Mediastinal CT, [x] Pleural CT        [x] Sinus rhythm, [x] Temporary pacing- VVI-40  Abdominal: Soft, NT, ND +BS,   Extremities: 1-2+ pedal edema noted, + peripheral pulses   Skin: No Rashes, Hematoma, Ecchymosis                           Assessment:    Plan:   ***Neuro***  [x] Sedated with [x] Precedex  Post operative neuro assessment   Gabapentin, oxy, meperidine and Dilaudid for pain     ***Cardiovascular***  Invasive hemodynamic monitoring, assess perfusion indices   SR / CVP 7 / MAP 75/ SvO2 ___ / Hct 32.3 / Lactate 2.2  [x] Levophed 0.02 mcgs/Kg/min  Volume: OR: 1 prbc   Reassessment of hemodynamics post resuscitation   Monitor chest tube outputs   PO Amiodarone for afib prophylaxis   [] Bleeding ___ / [] Nonbleeding ___   [x] ASA   Serial EKG and cardiac enzymes     ***Pulmonary***  Post op vent managmenet  Titration of FiO2 and PEEP, follow SpO2, CXR, blood gases     Mode: CPAP with PS  FiO2: 40  PEEP: 5  PS: 5  ITime: 1  MAP: 8  PIP: 11              Will plan to wean & extubate once pt is hemodynamically stable and not bleeding    ***GI***  Diet: Con Carbohydrates   [x] Protonix    Bowel regimen with Miralax     ***Renal***     Continue to monitor I/Os, BUN/Creatinine.   Replete lytes PRN  Resendiz present  [x] positive     ***ID***  Perioperative antibiotics     ***Endocrine***  [x] DM1 : HbA1c 7.2%               - [x] Insulin gtt               - Need tight glycemic control to prevent wound infection.      Patient requires continuous monitoring with bedside rhythm monitoring, pulse oximetry monitoring, and continuous central venous and arterial pressure monitoring; and intermittent blood gas analysis. Care plan discussed with the ICU care team.   Patient remained critical, at risk for life threatening decompensation.    I have spent 40 minutes providing critical care management to this patient.    By signing my name below, I, Shanna Bonilla, attest that this documentation has been prepared under the direction and in the presence of Frank Guerrero MD   Electronically signed: Nazario Stuart, 04-20-23 @ 17:50    I, Frank Guerrero, personally performed the services described in this documentation. all medical record entries made by the marcelinaibbobby were at my direction and in my presence. I have reviewed the chart and agree that the record reflects my personal performance and is accurate and complete  Electronically signed: Frank Guerrero MD  Patient seen and examined at the bedside.    Remained critically ill on continuous ICU monitoring.    OBJECTIVE:  Vital Signs Last 24 Hrs  T(C): 37 (20 Apr 2023 16:00), Max: 37 (20 Apr 2023 16:00)  T(F): 98.6 (20 Apr 2023 16:00), Max: 98.6 (20 Apr 2023 16:00)  HR: 85 (20 Apr 2023 17:15) (75 - 96)  BP: 124/75 (20 Apr 2023 09:00) (124/75 - 124/75)  BP(mean): --  RR: 17 (20 Apr 2023 17:15) (5 - 21)  SpO2: 100% (20 Apr 2023 17:15) (98% - 100%)    Parameters below as of 20 Apr 2023 16:00  Patient On (Oxygen Delivery Method): ventilator    O2 Concentration (%): 40      Physical Exam:  General: intubated multiple lines gtt & tubes   Neurology: sedated   Eyes: bilateral pupils equal and reactive   ENT/Neck: +ETT midline, Neck supple, trachea midline, No JVD   Respiratory: Rales noted bilaterally   CV: RRR, S1S2, no murmurs        [x] Sternal dressing, [x] Mediastinal CT, [x] Pleural CT        [x] Sinus rhythm, [x] Temporary pacing- VVI-40  Abdominal: Soft, NT, ND +BS,   Extremities: 1-2+ pedal edema noted, + peripheral pulses   Skin: No Rashes, Hematoma, Ecchymosis                           Assessment:  52 yr female BMI 30 / IDDM / HLD / Hypothyroidisms / HTN / CAD / S/P PCI X 2  S/P Cath multivessel CAD   S/P CABG X3 D#0  Post op labile BP related to hypovolemia   Episodes of severe HTN   Lactic acidosis     Plan:   ***Neuro***  [x] Sedated with [x] Precedex  Post operative neuro assessment   Gabapentin, oxy, meperidine and Dilaudid for pain     ***Cardiovascular***  Post op large volume resuscitation titration of gtt starting Cardene infusion    Invasive hemodynamic monitoring, assess serial perfusion indices   SR 90 / CVP 7 / MAP 60-95/ SvO2 92 / Hct 32.3 / Lactate 2.9  [x] Levophed ( on hold )  [x] Cardene 3mg/hr   Volume: [x] Albumin 1000 cc [x] LR 500cc [x] PRBC X1 (OR)  Reassessment of hemodynamics post resuscitation   Monitor chest tube outputs   [X]  Amiodarone for Afib prophylaxis   [X] Lipitor 80 mg  [x] ASA   Replete K>4 Mg >2  Serial EKG and cardiac enzymes     ***Pulmonary***  Post op vent management  Titration of FiO2 and PEEP, follow SpO2, CXR, blood gases     Mode: CPAP with PS  FiO2: 40  PEEP: 5  PS: 5  ITime: 1  MAP: 8  PIP: 11              Will plan to wean & extubate once pt is hemodynamically stable and not bleeding    ***GI***  Diet: Con Carbohydrates   [x] Protonix    Bowel regimen with Miralax     ***Renal***     Continue to monitor I/Os, BUN/Creatinine.   Replete lytes PRN  Resendiz present  [x] positive     ***ID***  Perioperative antibiotics     ***Endocrine***  [x] DM1 : HbA1c 7.2%               - [x] Insulin gtt               - Need tight glycemic control to prevent wound infection.             - Resume LT4 check TSH    Patient requires continuous monitoring with bedside rhythm monitoring, pulse oximetry monitoring, and continuous central venous and arterial pressure monitoring; and intermittent blood gas analysis. Care plan discussed with the ICU care team.   Patient remained critical, at risk for life threatening decompensation.    I have spent 40 minutes providing critical care management to this patient.    By signing my name below, I, Shanna Bonilla, attest that this documentation has been prepared under the direction and in the presence of Frank Guerrero MD   Electronically signed: Nazario Stuart, 04-20-23 @ 17:50    I, Frank Guerrero, personally performed the services described in this documentation. all medical record entries made by the marcelinaibbobby were at my direction and in my presence. I have reviewed the chart and agree that the record reflects my personal performance and is accurate and complete  Electronically signed: Frank Guerrero MD

## 2023-04-20 NOTE — PATIENT PROFILE ADULT - FALL HARM RISK - UNIVERSAL INTERVENTIONS
Bed in lowest position, wheels locked, appropriate side rails in place/Call bell, personal items and telephone in reach/Instruct patient to call for assistance before getting out of bed or chair/Non-slip footwear when patient is out of bed/[place] to call system/Physically safe environment - no spills, clutter or unnecessary equipment/Purposeful Proactive Rounding/Room/bathroom lighting operational, light cord in reach

## 2023-04-20 NOTE — BRIEF OPERATIVE NOTE - COMMENTS
EBL is inaccurate due to the use of cell saver and cardiotomy suction.    No unexpected foreign bodies were apparent on preliminary review of post-op CXR.

## 2023-04-20 NOTE — PRE-ANESTHESIA EVALUATION ADULT - MALLAMPATI CLASS
Class II - visualization of the soft palate, fauces, and uvula Yes - the patient is able to be screened

## 2023-04-21 DIAGNOSIS — E03.9 HYPOTHYROIDISM, UNSPECIFIED: ICD-10-CM

## 2023-04-21 LAB
A1C WITH ESTIMATED AVERAGE GLUCOSE RESULT: 7.2 % — HIGH (ref 4–5.6)
ALBUMIN SERPL ELPH-MCNC: 4.3 G/DL — SIGNIFICANT CHANGE UP (ref 3.3–5)
ALP SERPL-CCNC: 50 U/L — SIGNIFICANT CHANGE UP (ref 40–120)
ALT FLD-CCNC: 23 U/L — SIGNIFICANT CHANGE UP (ref 10–45)
ANION GAP SERPL CALC-SCNC: 13 MMOL/L — SIGNIFICANT CHANGE UP (ref 5–17)
APTT BLD: 23.7 SEC — LOW (ref 27.5–35.5)
AST SERPL-CCNC: 43 U/L — HIGH (ref 10–40)
BASOPHILS # BLD AUTO: 0.04 K/UL — SIGNIFICANT CHANGE UP (ref 0–0.2)
BASOPHILS NFR BLD AUTO: 0.2 % — SIGNIFICANT CHANGE UP (ref 0–2)
BILIRUB SERPL-MCNC: 0.6 MG/DL — SIGNIFICANT CHANGE UP (ref 0.2–1.2)
BUN SERPL-MCNC: 10 MG/DL — SIGNIFICANT CHANGE UP (ref 7–23)
CALCIUM SERPL-MCNC: 8.3 MG/DL — LOW (ref 8.4–10.5)
CHLORIDE SERPL-SCNC: 106 MMOL/L — SIGNIFICANT CHANGE UP (ref 96–108)
CO2 SERPL-SCNC: 23 MMOL/L — SIGNIFICANT CHANGE UP (ref 22–31)
CREAT SERPL-MCNC: 0.46 MG/DL — LOW (ref 0.5–1.3)
EGFR: 114 ML/MIN/1.73M2 — SIGNIFICANT CHANGE UP
EOSINOPHIL # BLD AUTO: 0 K/UL — SIGNIFICANT CHANGE UP (ref 0–0.5)
EOSINOPHIL NFR BLD AUTO: 0 % — SIGNIFICANT CHANGE UP (ref 0–6)
ESTIMATED AVERAGE GLUCOSE: 160 MG/DL — HIGH (ref 68–114)
GLUCOSE BLDC GLUCOMTR-MCNC: 110 MG/DL — HIGH (ref 70–99)
GLUCOSE BLDC GLUCOMTR-MCNC: 119 MG/DL — HIGH (ref 70–99)
GLUCOSE BLDC GLUCOMTR-MCNC: 120 MG/DL — HIGH (ref 70–99)
GLUCOSE BLDC GLUCOMTR-MCNC: 120 MG/DL — HIGH (ref 70–99)
GLUCOSE BLDC GLUCOMTR-MCNC: 128 MG/DL — HIGH (ref 70–99)
GLUCOSE BLDC GLUCOMTR-MCNC: 139 MG/DL — HIGH (ref 70–99)
GLUCOSE BLDC GLUCOMTR-MCNC: 153 MG/DL — HIGH (ref 70–99)
GLUCOSE BLDC GLUCOMTR-MCNC: 158 MG/DL — HIGH (ref 70–99)
GLUCOSE BLDC GLUCOMTR-MCNC: 174 MG/DL — HIGH (ref 70–99)
GLUCOSE BLDC GLUCOMTR-MCNC: 184 MG/DL — HIGH (ref 70–99)
GLUCOSE BLDC GLUCOMTR-MCNC: 191 MG/DL — HIGH (ref 70–99)
GLUCOSE BLDC GLUCOMTR-MCNC: 193 MG/DL — HIGH (ref 70–99)
GLUCOSE BLDC GLUCOMTR-MCNC: 202 MG/DL — HIGH (ref 70–99)
GLUCOSE BLDC GLUCOMTR-MCNC: 206 MG/DL — HIGH (ref 70–99)
GLUCOSE BLDC GLUCOMTR-MCNC: 209 MG/DL — HIGH (ref 70–99)
GLUCOSE BLDC GLUCOMTR-MCNC: 211 MG/DL — HIGH (ref 70–99)
GLUCOSE BLDC GLUCOMTR-MCNC: 216 MG/DL — HIGH (ref 70–99)
GLUCOSE BLDC GLUCOMTR-MCNC: 221 MG/DL — HIGH (ref 70–99)
GLUCOSE BLDC GLUCOMTR-MCNC: 66 MG/DL — LOW (ref 70–99)
GLUCOSE BLDC GLUCOMTR-MCNC: 71 MG/DL — SIGNIFICANT CHANGE UP (ref 70–99)
GLUCOSE BLDC GLUCOMTR-MCNC: 81 MG/DL — SIGNIFICANT CHANGE UP (ref 70–99)
GLUCOSE BLDC GLUCOMTR-MCNC: 85 MG/DL — SIGNIFICANT CHANGE UP (ref 70–99)
GLUCOSE SERPL-MCNC: 111 MG/DL — HIGH (ref 70–99)
HCT VFR BLD CALC: 26.2 % — LOW (ref 34.5–45)
HGB BLD-MCNC: 9 G/DL — LOW (ref 11.5–15.5)
IMM GRANULOCYTES NFR BLD AUTO: 0.6 % — SIGNIFICANT CHANGE UP (ref 0–0.9)
INR BLD: 1.09 RATIO — SIGNIFICANT CHANGE UP (ref 0.88–1.16)
LYMPHOCYTES # BLD AUTO: 0.5 K/UL — LOW (ref 1–3.3)
LYMPHOCYTES # BLD AUTO: 2.5 % — LOW (ref 13–44)
MAGNESIUM SERPL-MCNC: 2.2 MG/DL — SIGNIFICANT CHANGE UP (ref 1.6–2.6)
MCHC RBC-ENTMCNC: 32.7 PG — SIGNIFICANT CHANGE UP (ref 27–34)
MCHC RBC-ENTMCNC: 34.4 GM/DL — SIGNIFICANT CHANGE UP (ref 32–36)
MCV RBC AUTO: 95.3 FL — SIGNIFICANT CHANGE UP (ref 80–100)
MONOCYTES # BLD AUTO: 0.41 K/UL — SIGNIFICANT CHANGE UP (ref 0–0.9)
MONOCYTES NFR BLD AUTO: 2 % — SIGNIFICANT CHANGE UP (ref 2–14)
NEUTROPHILS # BLD AUTO: 19.03 K/UL — HIGH (ref 1.8–7.4)
NEUTROPHILS NFR BLD AUTO: 94.7 % — HIGH (ref 43–77)
NRBC # BLD: 0 /100 WBCS — SIGNIFICANT CHANGE UP (ref 0–0)
PHOSPHATE SERPL-MCNC: 2.3 MG/DL — LOW (ref 2.5–4.5)
PLATELET # BLD AUTO: 151 K/UL — SIGNIFICANT CHANGE UP (ref 150–400)
POTASSIUM SERPL-MCNC: 4.3 MMOL/L — SIGNIFICANT CHANGE UP (ref 3.5–5.3)
POTASSIUM SERPL-SCNC: 4.3 MMOL/L — SIGNIFICANT CHANGE UP (ref 3.5–5.3)
PROT SERPL-MCNC: 5.6 G/DL — LOW (ref 6–8.3)
PROTHROM AB SERPL-ACNC: 12.5 SEC — SIGNIFICANT CHANGE UP (ref 10.5–13.4)
RBC # BLD: 2.75 M/UL — LOW (ref 3.8–5.2)
RBC # FLD: 14.1 % — SIGNIFICANT CHANGE UP (ref 10.3–14.5)
SODIUM SERPL-SCNC: 142 MMOL/L — SIGNIFICANT CHANGE UP (ref 135–145)
T3 SERPL-MCNC: 52 NG/DL — LOW (ref 80–200)
T4 AB SER-ACNC: 4 UG/DL — LOW (ref 4.6–12)
TSH SERPL-MCNC: 1.68 UIU/ML — SIGNIFICANT CHANGE UP (ref 0.27–4.2)
WBC # BLD: 20.1 K/UL — HIGH (ref 3.8–10.5)
WBC # FLD AUTO: 20.1 K/UL — HIGH (ref 3.8–10.5)

## 2023-04-21 PROCEDURE — 99291 CRITICAL CARE FIRST HOUR: CPT

## 2023-04-21 PROCEDURE — 71045 X-RAY EXAM CHEST 1 VIEW: CPT | Mod: 26,76

## 2023-04-21 PROCEDURE — 99222 1ST HOSP IP/OBS MODERATE 55: CPT

## 2023-04-21 PROCEDURE — 93010 ELECTROCARDIOGRAM REPORT: CPT

## 2023-04-21 PROCEDURE — 93010 ELECTROCARDIOGRAM REPORT: CPT | Mod: 77

## 2023-04-21 RX ORDER — METOPROLOL TARTRATE 50 MG
12.5 TABLET ORAL
Refills: 0 | Status: DISCONTINUED | OUTPATIENT
Start: 2023-04-21 | End: 2023-04-22

## 2023-04-21 RX ORDER — CLOPIDOGREL BISULFATE 75 MG/1
75 TABLET, FILM COATED ORAL DAILY
Refills: 0 | Status: DISCONTINUED | OUTPATIENT
Start: 2023-04-22 | End: 2023-04-24

## 2023-04-21 RX ORDER — INSULIN LISPRO 100/ML
13 VIAL (ML) SUBCUTANEOUS
Refills: 0 | Status: DISCONTINUED | OUTPATIENT
Start: 2023-04-21 | End: 2023-04-21

## 2023-04-21 RX ORDER — INSULIN GLARGINE 100 [IU]/ML
45 INJECTION, SOLUTION SUBCUTANEOUS AT BEDTIME
Refills: 0 | Status: DISCONTINUED | OUTPATIENT
Start: 2023-04-21 | End: 2023-04-21

## 2023-04-21 RX ORDER — INSULIN LISPRO 100/ML
VIAL (ML) SUBCUTANEOUS
Refills: 0 | Status: DISCONTINUED | OUTPATIENT
Start: 2023-04-21 | End: 2023-04-24

## 2023-04-21 RX ORDER — INSULIN LISPRO 100/ML
15 VIAL (ML) SUBCUTANEOUS
Refills: 0 | Status: DISCONTINUED | OUTPATIENT
Start: 2023-04-21 | End: 2023-04-22

## 2023-04-21 RX ORDER — INSULIN GLARGINE 100 [IU]/ML
48 INJECTION, SOLUTION SUBCUTANEOUS AT BEDTIME
Refills: 0 | Status: DISCONTINUED | OUTPATIENT
Start: 2023-04-21 | End: 2023-04-22

## 2023-04-21 RX ORDER — PANTOPRAZOLE SODIUM 20 MG/1
40 TABLET, DELAYED RELEASE ORAL
Refills: 0 | Status: DISCONTINUED | OUTPATIENT
Start: 2023-04-21 | End: 2023-04-24

## 2023-04-21 RX ORDER — INSULIN LISPRO 100/ML
VIAL (ML) SUBCUTANEOUS AT BEDTIME
Refills: 0 | Status: DISCONTINUED | OUTPATIENT
Start: 2023-04-21 | End: 2023-04-24

## 2023-04-21 RX ORDER — ENOXAPARIN SODIUM 100 MG/ML
40 INJECTION SUBCUTANEOUS EVERY 24 HOURS
Refills: 0 | Status: DISCONTINUED | OUTPATIENT
Start: 2023-04-21 | End: 2023-04-24

## 2023-04-21 RX ADMIN — Medication 12.5 MILLIGRAM(S): at 14:06

## 2023-04-21 RX ADMIN — PANTOPRAZOLE SODIUM 40 MILLIGRAM(S): 20 TABLET, DELAYED RELEASE ORAL at 11:26

## 2023-04-21 RX ADMIN — Medication 650 MILLIGRAM(S): at 00:35

## 2023-04-21 RX ADMIN — Medication 63.75 MILLIMOLE(S): at 03:17

## 2023-04-21 RX ADMIN — Medication 650 MILLIGRAM(S): at 17:55

## 2023-04-21 RX ADMIN — Medication 100 MILLIGRAM(S): at 07:54

## 2023-04-21 RX ADMIN — HYDROMORPHONE HYDROCHLORIDE 0.5 MILLIGRAM(S): 2 INJECTION INTRAMUSCULAR; INTRAVENOUS; SUBCUTANEOUS at 03:00

## 2023-04-21 RX ADMIN — ATORVASTATIN CALCIUM 80 MILLIGRAM(S): 80 TABLET, FILM COATED ORAL at 22:13

## 2023-04-21 RX ADMIN — CHLORHEXIDINE GLUCONATE 1 APPLICATION(S): 213 SOLUTION TOPICAL at 14:20

## 2023-04-21 RX ADMIN — Medication 100 MILLIGRAM(S): at 17:54

## 2023-04-21 RX ADMIN — Medication 137 MICROGRAM(S): at 05:02

## 2023-04-21 RX ADMIN — OXYCODONE HYDROCHLORIDE 5 MILLIGRAM(S): 5 TABLET ORAL at 10:00

## 2023-04-21 RX ADMIN — Medication 100 MILLIGRAM(S): at 00:35

## 2023-04-21 RX ADMIN — AMIODARONE HYDROCHLORIDE 400 MILLIGRAM(S): 400 TABLET ORAL at 05:05

## 2023-04-21 RX ADMIN — Medication 650 MILLIGRAM(S): at 12:00

## 2023-04-21 RX ADMIN — GABAPENTIN 100 MILLIGRAM(S): 400 CAPSULE ORAL at 22:13

## 2023-04-21 RX ADMIN — PANTOPRAZOLE SODIUM 40 MILLIGRAM(S): 20 TABLET, DELAYED RELEASE ORAL at 14:20

## 2023-04-21 RX ADMIN — INSULIN HUMAN 3 UNIT(S)/HR: 100 INJECTION, SOLUTION SUBCUTANEOUS at 07:54

## 2023-04-21 RX ADMIN — Medication 650 MILLIGRAM(S): at 18:20

## 2023-04-21 RX ADMIN — POLYETHYLENE GLYCOL 3350 17 GRAM(S): 17 POWDER, FOR SOLUTION ORAL at 11:25

## 2023-04-21 RX ADMIN — Medication 650 MILLIGRAM(S): at 05:02

## 2023-04-21 RX ADMIN — OXYCODONE HYDROCHLORIDE 10 MILLIGRAM(S): 5 TABLET ORAL at 18:00

## 2023-04-21 RX ADMIN — AMIODARONE HYDROCHLORIDE 400 MILLIGRAM(S): 400 TABLET ORAL at 17:54

## 2023-04-21 RX ADMIN — OXYCODONE HYDROCHLORIDE 10 MILLIGRAM(S): 5 TABLET ORAL at 05:30

## 2023-04-21 RX ADMIN — ENOXAPARIN SODIUM 40 MILLIGRAM(S): 100 INJECTION SUBCUTANEOUS at 14:07

## 2023-04-21 RX ADMIN — OXYCODONE HYDROCHLORIDE 10 MILLIGRAM(S): 5 TABLET ORAL at 05:00

## 2023-04-21 RX ADMIN — GABAPENTIN 100 MILLIGRAM(S): 400 CAPSULE ORAL at 05:03

## 2023-04-21 RX ADMIN — Medication 500 MILLIGRAM(S): at 05:02

## 2023-04-21 RX ADMIN — Medication 650 MILLIGRAM(S): at 11:25

## 2023-04-21 RX ADMIN — OXYCODONE HYDROCHLORIDE 5 MILLIGRAM(S): 5 TABLET ORAL at 10:30

## 2023-04-21 RX ADMIN — OXYCODONE HYDROCHLORIDE 10 MILLIGRAM(S): 5 TABLET ORAL at 18:30

## 2023-04-21 RX ADMIN — HYDROMORPHONE HYDROCHLORIDE 0.5 MILLIGRAM(S): 2 INJECTION INTRAMUSCULAR; INTRAVENOUS; SUBCUTANEOUS at 03:10

## 2023-04-21 RX ADMIN — INSULIN GLARGINE 48 UNIT(S): 100 INJECTION, SOLUTION SUBCUTANEOUS at 17:55

## 2023-04-21 RX ADMIN — Medication 500 MILLIGRAM(S): at 17:54

## 2023-04-21 RX ADMIN — Medication 81 MILLIGRAM(S): at 11:25

## 2023-04-21 RX ADMIN — Medication 12.5 MILLIGRAM(S): at 17:54

## 2023-04-21 RX ADMIN — Medication 1000 MILLIGRAM(S): at 07:25

## 2023-04-21 RX ADMIN — GABAPENTIN 100 MILLIGRAM(S): 400 CAPSULE ORAL at 14:06

## 2023-04-21 NOTE — SBIRT NOTE ADULT - NSSBIRTDRGPOSREINDET_GEN_A_CORE
Discussed benefits of current behavior and rate of usage, including effects on health and relationships.

## 2023-04-21 NOTE — BH CONSULTATION LIAISON ASSESSMENT NOTE - NSBHCHARTREVIEWINVESTIGATE_PSY_A_CORE FT
< from: 12 Lead ECG (04.04.23 @ 23:14) >      Ventricular Rate 91 BPM    Atrial Rate 91 BPM    P-R Interval 148 ms    QRS Duration 96 ms    Q-T Interval 382 ms    QTC Calculation(Bazett) 469 ms    P Axis 51 degrees    R Axis 30 degrees    T Axis 86 degrees    Diagnosis Line Normal sinus rhythm  Possible Left atrial enlargement  Cannot rule out Anterior infarct , age undetermined  ST & T wave abnormality, consider lateral ischemia  Abnormal ECG  When compared with ECG of 25-MAR-2019 14:52,  ST now depressed in Lateral leads  T wave inversion now evident in Lateral leads  Confirmed by Nicho ROGERS, Johnathan ([id number]) on 4/6/2023 3:38:05 PM    < end of copied text >

## 2023-04-21 NOTE — PHYSICAL THERAPY INITIAL EVALUATION ADULT - PERTINENT HX OF CURRENT PROBLEM, REHAB EVAL
52 year old female with hx of IDDM  since age 7, HTN, CAD s/p  cardiac stents. Has been experiencing "heartburn, dizziness with minimal activity evaluation by cardiology referred for cardiac cath.  Results revealed triple vessel disease referred for bypass. POD 1 C3L- LIMA to LAD, R SVG to OM1 and OM2

## 2023-04-21 NOTE — BH CONSULTATION LIAISON ASSESSMENT NOTE - NSBHCHARTREVIEWLAB_PSY_A_CORE FT
.  LABS:                         9.0    20.10 )-----------( 151      ( 21 Apr 2023 01:26 )             26.2     04-21    142  |  106  |  10  ----------------------------<  111<H>  4.3   |  23  |  0.46<L>    Ca    8.3<L>      21 Apr 2023 01:26  Phos  2.3     04-21  Mg     2.2     04-21    TPro  5.6<L>  /  Alb  4.3  /  TBili  0.6  /  DBili  x   /  AST  43<H>  /  ALT  23  /  AlkPhos  50  04-21    PT/INR - ( 21 Apr 2023 01:26 )   PT: 12.5 sec;   INR: 1.09 ratio         PTT - ( 21 Apr 2023 01:26 )  PTT:23.7 sec          RADIOLOGY, EKG & ADDITIONAL TESTS: Reviewed.

## 2023-04-21 NOTE — CONSULT NOTE ADULT - PROBLEM SELECTOR RECOMMENDATION 2
On medications,  no chest pain, stable, monitored and followed up by primary cardiothoracic team/cardiology team.  s/p CABG

## 2023-04-21 NOTE — BH CONSULTATION LIAISON ASSESSMENT NOTE - HPI (INCLUDE ILLNESS QUALITY, SEVERITY, DURATION, TIMING, CONTEXT, MODIFYING FACTORS, ASSOCIATED SIGNS AND SYMPTOMS)
52 year old female, , employed at an  firm, domiciled at a home in [place] with her  & 3 adult daughters, with no PPHx or psychiatric admissions, denies substance abuse, current cigarette user (35yrs x 1-3 cigarettes/day), PMH significant for IDDM, HTN, CAD s/p cardiac stents, presenting for scheduled CABG, psychiatry consulted for anxiety. Patient seen at bedside, calm & cooperative, AOx4. Her  was diagnosed with prostate cancer with mets in 2017, and later found to have papillary thyroid cancer. She reports having "anxiety attacks" consisting of ruminating, over thinking, cold sweats & palpitations 1-2 x a week over the past 6 months. Denies pSA, SI/HI, paranoia, delusions, hallucinations, anhedonia, depressed mood, insomnia, change in appetite or energy.    52F, , has adult children, employed at an accounting firm, domiciled at a home in [place] with her  & 3 adult daughters, with no formal PPHx or psychiatric admissions/SA, denies substance abuse, current cigarette user (35yrs x 1-3 cigarettes/day), PMH significant for IDDM, HTN, CAD s/p cardiac stents, presenting for scheduled CABG, psychiatry consulted for anxiety.     Patient seen at bedside, calm & cooperative, AOx4. Her  was diagnosed with prostate cancer with mets in 2017, and later found to have papillary thyroid cancer. She reports having "anxiety attacks" consisting of ruminating, over thinking, cold sweats & palpitations 1-2 x a week over the past 6 months. Denies pSA, SI/HI, paranoia, delusions, hallucinations, anhedonia, depressed mood, insomnia, change in appetite or energy.  States she would be interested in trying as needed medication for her anxiety.

## 2023-04-21 NOTE — PROGRESS NOTE ADULT - SUBJECTIVE AND OBJECTIVE BOX
CRITICAL CARE ATTENDING - [facility]    MEDICATIONS  (STANDING):  acetaminophen     Tablet .. 650 milliGRAM(s) Oral every 6 hours  aMIOdarone    Tablet 400 milliGRAM(s) Oral two times a day  ascorbic acid 500 milliGRAM(s) Oral two times a day  aspirin enteric coated 81 milliGRAM(s) Oral daily  atorvastatin 80 milliGRAM(s) Oral at bedtime  cefuroxime  IVPB 1500 milliGRAM(s) IV Intermittent every 8 hours  chlorhexidine 2% Cloths 1 Application(s) Topical daily  dextrose 50% Injectable 50 milliLiter(s) IV Push every 15 minutes  gabapentin 100 milliGRAM(s) Oral every 8 hours  insulin regular Infusion 3 Unit(s)/Hr (3 mL/Hr) IV Continuous <Continuous>  levothyroxine 137 MICROGram(s) Oral daily  niCARdipine Infusion 5 mG/Hr (25 mL/Hr) IV Continuous <Continuous>  pantoprazole  Injectable 40 milliGRAM(s) IV Push daily  polyethylene glycol 3350 17 Gram(s) Oral daily  potassium chloride  10 mEq/50 mL IVPB 10 milliEquivalent(s) IV Intermittent every 1 hour  potassium chloride  10 mEq/50 mL IVPB 10 milliEquivalent(s) IV Intermittent every 1 hour  potassium chloride  10 mEq/50 mL IVPB 10 milliEquivalent(s) IV Intermittent every 1 hour  senna 2 Tablet(s) Oral at bedtime  sodium chloride 0.9%. 1000 milliLiter(s) (10 mL/Hr) IV Continuous <Continuous>                                    9.0    20.10 )-----------( 151      ( 2023 01:26 )             26.2       04-21    142  |  106  |  10  ----------------------------<  111<H>  4.3   |  23  |  0.46<L>    Ca    8.3<L>      2023 01:26  Phos  2.3     04-21  Mg     2.2     -    TPro  5.6<L>  /  Alb  4.3  /  TBili  0.6  /  DBili  x   /  AST  43<H>  /  ALT  23  /  AlkPhos  50  04-21      PT/INR - ( 2023 01:26 )   PT: 12.5 sec;   INR: 1.09 ratio         PTT - ( 2023 01:26 )  PTT:23.7 sec    Mode: CPAP with PS  FiO2: 40  PEEP: 5  PS: 5  ITime: 1  MAP: 8  PIP: 11      Daily Height in cm: 167.64 (2023 09:00)    Daily Weight in k.4 (2023 03:00)      04-20 @ 07:01  -  - @ 06:57  --------------------------------------------------------  IN: 2741 mL / OUT: 2270 mL / NET: 471 mL        Critically Ill patient  : [ ] preoperative ,   [x] post operative    Requires :  [x] Arterial Line   [x] Central Line  [ ] PA catheter  [ ] IABP  [ ] ECMO  [ ] LVAD  [ ] Ventilator  [x] pacemaker- TPM [ ] Impella.                      [x ] ABG's     [ x] Pulse Oxymetry Monitoring  Bedside evaluation , monitoring , treatment of hemodynamics , fluids , IVP/ IVCD meds.        Diagnosis:     POD 1 C3L- LIMA to LAD, R SVG to OM1 and OM2    Hypovolemia    Chest tube drainage / management     Requires chest PT, pulmonary toilet, suctioning to maintain SaO2,  patent airway and treat atelectasis.     Extubated yesterday    Temporary pacemaker (TPM) interrogation and setting.     Requires  [ ]DDD  [x]VVI    [x]AII  temporary pacing at 40 to maintain HR, MAP, CI, and perfusion.     Hemodynamic lability,  instability. Requires IVCD [x] vasopressors- weaned off [ ] inotropes  [ ] vasodilator  [x]IVSS fluid  to maintain MAP, perfusion, C.I.     IVCD Insulin    Requires bedside physical therapy, mobilization and total [facility] care.         By signing my name below, I, Maria D'Amico, attest that this documentation has been prepared under the direction and in the presence of Dwayne Urbina MD.   Electronically Signed: Maria D'Amico, Scribe 23 @ 06:57    I, Dwayne Urbina, personally performed the services described in this documentation. All medical record entries made by the scribe were at my direction and in my presence. I have reviewed the chart and agree that the record reflects my personal performance and is accurate and complete.   Dwayne Urbina MD.       Discussed with CT surgeon, Physician Assistant - Nurse Practitioner- Critical care medicine team.   Discussed at  AM / PM rounds.   Chart, labs , films reviewed.    Cumulative Critical Care Time Given Today:  CRITICAL CARE ATTENDING - [facility]    MEDICATIONS  (STANDING):  acetaminophen     Tablet .. 650 milliGRAM(s) Oral every 6 hours  aMIOdarone    Tablet 400 milliGRAM(s) Oral two times a day  ascorbic acid 500 milliGRAM(s) Oral two times a day  aspirin enteric coated 81 milliGRAM(s) Oral daily  atorvastatin 80 milliGRAM(s) Oral at bedtime  cefuroxime  IVPB 1500 milliGRAM(s) IV Intermittent every 8 hours  chlorhexidine 2% Cloths 1 Application(s) Topical daily  dextrose 50% Injectable 50 milliLiter(s) IV Push every 15 minutes  gabapentin 100 milliGRAM(s) Oral every 8 hours  insulin regular Infusion 3 Unit(s)/Hr (3 mL/Hr) IV Continuous <Continuous>  levothyroxine 137 MICROGram(s) Oral daily  niCARdipine Infusion 5 mG/Hr (25 mL/Hr) IV Continuous <Continuous>  pantoprazole  Injectable 40 milliGRAM(s) IV Push daily  polyethylene glycol 3350 17 Gram(s) Oral daily  potassium chloride  10 mEq/50 mL IVPB 10 milliEquivalent(s) IV Intermittent every 1 hour  potassium chloride  10 mEq/50 mL IVPB 10 milliEquivalent(s) IV Intermittent every 1 hour  potassium chloride  10 mEq/50 mL IVPB 10 milliEquivalent(s) IV Intermittent every 1 hour  senna 2 Tablet(s) Oral at bedtime  sodium chloride 0.9%. 1000 milliLiter(s) (10 mL/Hr) IV Continuous <Continuous>                                    9.0    20.10 )-----------( 151      ( 2023 01:26 )             26.2       04-21    142  |  106  |  10  ----------------------------<  111<H>  4.3   |  23  |  0.46<L>    Ca    8.3<L>      2023 01:26  Phos  2.3     04-21  Mg     2.2     -    TPro  5.6<L>  /  Alb  4.3  /  TBili  0.6  /  DBili  x   /  AST  43<H>  /  ALT  23  /  AlkPhos  50  04-21      PT/INR - ( 2023 01:26 )   PT: 12.5 sec;   INR: 1.09 ratio         PTT - ( 2023 01:26 )  PTT:23.7 sec    Mode: CPAP with PS  FiO2: 40  PEEP: 5  PS: 5  ITime: 1  MAP: 8  PIP: 11      Daily Height in cm: 167.64 (2023 09:00)    Daily Weight in k.4 (2023 03:00)      04-20 @ 07:01  -   @ 06:57  --------------------------------------------------------  IN: 2741 mL / OUT: 2270 mL / NET: 471 mL        Critically Ill patient  : [ ] preoperative ,   [x] post operative    Requires :  [x] Arterial Line   [x] Central Line  [ ] PA catheter  [ ] IABP  [ ] ECMO  [ ] LVAD  [ ] Ventilator  [x] pacemaker- TPM [ ] Impella.                      [x ] ABG's     [ x] Pulse Oxymetry Monitoring  Bedside evaluation , monitoring , treatment of hemodynamics , fluids , IVP/ IVCD meds.        Diagnosis:     POD 1 C3L-     Hypovolemia    Hypotension a/w Hypertension, requiring intravenous medication.     Chest tube drainage / management     Requires chest PT, pulmonary toilet, suctioning to maintain SaO2,  patent airway and treat atelectasis.     Extubated yesterday    Bradycardia    Temporary pacemaker (TPM) interrogation and setting.     Requires  [ ]DDD  [x]VVI    [x]AII  temporary pacing at 80 min to maintain HR, MAP, CI, and perfusion.     Hemodynamic lability,  instability. Requires IVCD [x] vasopressors- weaned off [ ] inotropes  [ x] vasodilator  [x]IVSS fluid  to maintain MAP, perfusion, C.I.     IVCD Insulin    Requires bedside physical therapy, mobilization and total [facility] care.         By signing my name below, I, Maria D'Amico, attest that this documentation has been prepared under the direction and in the presence of Dwayne Urbina MD.   Electronically Signed: Maria D'Amico, Scribe 23 @ 06:57    I, Dwayne Urbina, personally performed the services described in this documentation. All medical record entries made by the scribe were at my direction and in my presence. I have reviewed the chart and agree that the record reflects my personal performance and is accurate and complete.   Dwayne Urbina MD.       Discussed with CT surgeon, Physician Assistant - Nurse Practitioner- Critical care medicine team.   Discussed at  AM / PM rounds.   Chart, labs , films reviewed.    Cumulative Critical Care Time Given Today:   30 min

## 2023-04-21 NOTE — CONSULT NOTE ADULT - SUBJECTIVE AND OBJECTIVE BOX
HPI:  52 year old female with hx of IDDM  since age 7, HTN, CAD s/p  cardiac stents. Has been experiencing "heartburn, dizziness with minimal activity evaluation by cardiology referred for cardiac cath.  Results revealed triple vessel disease referred for bypass.     *Pt c/o dizziness , lightheaded at PST , we called Dr Tirado (surgeon) . He spoke with patient. Pt can go home today after PST.  We instructed if she has heartburn, dizziness, lightheaded at home call surgeon office and or go to ER.         (17 Apr 2023 14:05)      Patient has history of diabetes TYPE 1 , A1C 7.2 % on basal bolus insulin at home.   Endo was consulted for glycemic control.      PAST MEDICAL & SURGICAL HISTORY:  Essential Hypertension      History of Hypothyroidism      Hypercholesteremia      CAD (Coronary Artery Disease)      Type 1 diabetes mellitus, with long-term current use of insulin  age    7      Stented coronary artery  2010 x 2 - [facility], 2012 X1 [facility]      ST elevation myocardial infarction involving right coronary artery  6/26/2010- S/P 2 STENTS [facility]      Vitamin D deficiency      Anemia due to blood loss  Menorrhagia- 2017 June - 2 U PRBC Placed, Mirena IUD placement 6/2017      Cervical dysplasia      2019 novel coronavirus disease (COVID-19)      Orthostatic dizziness      S/P coronary artery stent placement  2010 x 2, 2012 x 1      H/O dilation and curettage      History of hysteroscopy          FAMILY HISTORY:  FH: heart attack (Mother)        Social History:            HOME MEDICATIONS:  Home Medications:  Admelog 100 units/mL injectable solution: injectable 3 times a day based on fingerstick (20 Apr 2023 05:56)  amLODIPine 5 mg oral tablet: 1 orally once a day (20 Apr 2023 05:56)  aspirin 81 mg oral tablet: 1 tab(s) orally once a day (20 Apr 2023 05:56)  atorvastatin 80 mg oral tablet: 1 orally once a day (20 Apr 2023 05:56)  Basaglar KwikPen 100 units/mL subcutaneous solution: 12 unit(s) subcutaneous once a day (at bedtime) (20 Apr 2023 05:56)  Basaglar KwikPen 100 units/mL subcutaneous solution: subcutaneous once a day (20 Apr 2023 05:56)  Calcium 600+D 600 mg-200 intl units oral tablet: 1 tab(s) orally 2 times a day (20 Apr 2023 05:56)  enalapril 20 mg oral tablet: 1 orally once a day (20 Apr 2023 05:56)  ferrous sulfate 325 mg (65 mg elemental iron) oral delayed release tablet: 1 tab(s) orally once a day (20 Apr 2023 05:56)  levothyroxine 137 mcg (0.137 mg) oral tablet: 1 tab(s) orally once a day (20 Apr 2023 05:56)  metoprolol succinate 50 mg oral tablet, extended release: 1 orally once a day (20 Apr 2023 05:56)  Pepcid 40 mg oral tablet: 1 tab(s) orally once a day (20 Apr 2023 05:56)  Plavix 75 mg oral tablet: 1 orally once a day (20 Apr 2023 05:56)  Tums 500 mg oral tablet, chewable: 1 tab(s) chewed prn as needed for  heartburn (20 Apr 2023 05:56)  Vitamin D2 50,000 intl units (1.25 mg) oral capsule: 1 cap(s) orally once a week (20 Apr 2023 05:56)            MEDICATIONS  (STANDING):  acetaminophen     Tablet .. 650 milliGRAM(s) Oral every 6 hours  aMIOdarone    Tablet 400 milliGRAM(s) Oral two times a day  ascorbic acid 500 milliGRAM(s) Oral two times a day  aspirin enteric coated 81 milliGRAM(s) Oral daily  atorvastatin 80 milliGRAM(s) Oral at bedtime  cefuroxime  IVPB 1500 milliGRAM(s) IV Intermittent every 8 hours  chlorhexidine 2% Cloths 1 Application(s) Topical daily  dextrose 50% Injectable 50 milliLiter(s) IV Push every 15 minutes  enoxaparin Injectable 40 milliGRAM(s) SubCutaneous every 24 hours  gabapentin 100 milliGRAM(s) Oral every 8 hours  insulin glargine Injectable (LANTUS) 45 Unit(s) SubCutaneous at bedtime  insulin lispro (ADMELOG) corrective regimen sliding scale   SubCutaneous three times a day before meals  insulin lispro (ADMELOG) corrective regimen sliding scale   SubCutaneous at bedtime  insulin lispro Injectable (ADMELOG) 13 Unit(s) SubCutaneous three times a day before meals  insulin regular Infusion 3 Unit(s)/Hr (3 mL/Hr) IV Continuous <Continuous>  levothyroxine 137 MICROGram(s) Oral daily  metoprolol tartrate 12.5 milliGRAM(s) Oral two times a day  pantoprazole    Tablet 40 milliGRAM(s) Oral before breakfast  polyethylene glycol 3350 17 Gram(s) Oral daily  potassium chloride  10 mEq/50 mL IVPB 10 milliEquivalent(s) IV Intermittent every 1 hour  potassium chloride  10 mEq/50 mL IVPB 10 milliEquivalent(s) IV Intermittent every 1 hour  potassium chloride  10 mEq/50 mL IVPB 10 milliEquivalent(s) IV Intermittent every 1 hour  senna 2 Tablet(s) Oral at bedtime  sodium chloride 0.9%. 1000 milliLiter(s) (10 mL/Hr) IV Continuous <Continuous>    MEDICATIONS  (PRN):  HYDROmorphone  Injectable 0.5 milliGRAM(s) IV Push every 6 hours PRN Breakthrough Pain  oxyCODONE    IR 5 milliGRAM(s) Oral every 4 hours PRN Moderate Pain (4 - 6)  oxyCODONE    IR 10 milliGRAM(s) Oral every 4 hours PRN Severe Pain (7 - 10)      Allergies    No Known Allergies    Intolerances    adhesives (Urticaria)      Review of Systems:  Neuro: No HA, no dizziness  Cardiovascular: No chest pain, no palpitations  Respiratory: no SOB, no cough  GI: No nausea, vomiting, abdominal pain  MSK: Denies joint/muscle pain      ALL OTHER SYSTEMS REVIEWED AND NEGATIVE        PHYSICAL EXAM:  VITALS: T(C): 37 (04-21-23 @ 12:00)  T(F): 98.6 (04-21-23 @ 12:00), Max: 99 (04-21-23 @ 00:00)  HR: 76 (04-21-23 @ 14:00) (76 - 96)  BP: 107/54 (04-21-23 @ 14:00) (101/52 - 138/60)  RR:  (5 - 31)  SpO2:  (89% - 100%)  Wt(kg): --  GENERAL: NAD, well-groomed, well-developed  NEURO:  alert and oriented  RESPIRATORY: Clear to auscultation bilaterally; No rales, rhonchi, wheezing  CARDIOVASCULAR: Si S2  GI: Soft, non distended, normal bowel sounds  MUSCULOSKELETAL: Moves all extremities equally       POCT Blood Glucose.: 191 mg/dL (04-21-23 @ 14:11)  POCT Blood Glucose.: 193 mg/dL (04-21-23 @ 13:15)  POCT Blood Glucose.: 206 mg/dL (04-21-23 @ 11:13)  POCT Blood Glucose.: 202 mg/dL (04-21-23 @ 10:12)  POCT Blood Glucose.: 209 mg/dL (04-21-23 @ 09:21)  POCT Blood Glucose.: 81 mg/dL (04-21-23 @ 09:19)  POCT Blood Glucose.: 110 mg/dL (04-21-23 @ 07:58)  POCT Blood Glucose.: 184 mg/dL (04-21-23 @ 07:00)  POCT Blood Glucose.: 120 mg/dL (04-21-23 @ 06:20)  POCT Blood Glucose.: 119 mg/dL (04-21-23 @ 05:18)  POCT Blood Glucose.: 128 mg/dL (04-21-23 @ 03:55)  POCT Blood Glucose.: 85 mg/dL (04-21-23 @ 03:22)  POCT Blood Glucose.: 66 mg/dL (04-21-23 @ 02:52)  POCT Blood Glucose.: 71 mg/dL (04-21-23 @ 02:10)  POCT Blood Glucose.: 120 mg/dL (04-21-23 @ 01:15)  POCT Blood Glucose.: 158 mg/dL (04-21-23 @ 00:05)  POCT Blood Glucose.: 169 mg/dL (04-20-23 @ 23:02)  POCT Blood Glucose.: 93 mg/dL (04-20-23 @ 21:51)  POCT Blood Glucose.: 101 mg/dL (04-20-23 @ 21:20)  POCT Blood Glucose.: 112 mg/dL (04-20-23 @ 19:07)  POCT Blood Glucose.: 149 mg/dL (04-20-23 @ 16:57)  POCT Blood Glucose.: 191 mg/dL (04-20-23 @ 15:51)  POCT Blood Glucose.: 181 mg/dL (04-20-23 @ 14:50)  POCT Blood Glucose.: 99 mg/dL (04-20-23 @ 06:01)                            9.0    20.10 )-----------( 151      ( 21 Apr 2023 01:26 )             26.2       04-21    142  |  106  |  10  ----------------------------<  111<H>  4.3   |  23  |  0.46<L>    eGFR: 114    Ca    8.3<L>      04-21  Mg     2.2     04-21  Phos  2.3     04-21    TPro  5.6<L>  /  Alb  4.3  /  TBili  0.6  /  DBili  x   /  AST  43<H>  /  ALT  23  /  AlkPhos  50  04-21      Thyroid Function Tests:  04-20 @ 22:16 TSH 1.68 FreeT4 -- T3 52 Anti TPO -- Anti Thyroglobulin Ab -- TSI --    Diet, Consistent Carbohydrate/No Snacks (04-20-23 @ 06:06) [Active]          A1C with Estimated Average Glucose Result: 7.2 % (04-17-23 @ 14:50)    < from: US Thyroid + Parathyroid (09.15.18 @ 12:26) >  INTERPRETATION:  CLINICAL INFORMATION: Hashimoto's thyroiditis.    COMPARISON: None available.    TECHNIQUE: Sonography of the thyroid.     FINDINGS:    Right Lobe: 4.0 x 1.3 x 0.9 cm. Mildly atrophic and diffusely   heterogeneous in appearance suggestive of Hashimoto's thyroiditis. No   dominant or suspicious lesions.    Left Lobe: 3.5 x 1.2 x 1.1 cm. Mildly atrophic and diffusely   heterogeneous in appearance suggestive of Hashimoto's thyroiditis. No   dominant or suspicious lesions.    Isthmus: 3 mm.        Cervical Lymph Nodes: No enlarged or abnormal morphology cervical nodes.    IMPRESSION:     Diffusely atrophic and heterogeneous thyroid gland consistent with   Hashimoto's thyroiditis. No dominant or suspicious lesions.              < end of copied text >                   HPI:  52 year old female with hx of IDDM  since age 7, HTN, CAD s/p  cardiac stents. Has been experiencing "heartburn, dizziness with minimal activity evaluation by cardiology  referred for cardiac cath.  Results revealed triple vessel disease referred for bypass.     *Pt c/o dizziness , lightheaded at PST , we called Dr Tirado (surgeon) . He spoke with patient. Pt can go home today after PST.  We instructed if she has heartburn, dizziness, lightheaded at home call surgeon office and or go to ER.         (17 Apr 2023 14:05)      Patient has history of diabetes TYPE 1 , A1C 7.2 % on basal bolus insulin at home.   Endo was consulted for glycemic control.      PAST MEDICAL & SURGICAL HISTORY:  Essential Hypertension      History of Hypothyroidism      Hypercholesteremia      CAD (Coronary Artery Disease)      Type 1 diabetes mellitus, with long-term current use of insulin  age    7      Stented coronary artery  2010 x 2 - [facility], 2012 X1 [facility]      ST elevation myocardial infarction involving right coronary artery  6/26/2010- S/P 2 STENTS [facility]      Vitamin D deficiency      Anemia due to blood loss  Menorrhagia- 2017 June - 2 U PRBC Placed, Mirena IUD placement 6/2017      Cervical dysplasia      2019 novel coronavirus disease (COVID-19)      Orthostatic dizziness      S/P coronary artery stent placement  2010 x 2, 2012 x 1      H/O dilation and curettage      History of hysteroscopy          FAMILY HISTORY:  FH: heart attack (Mother)        Social History:            HOME MEDICATIONS:  Home Medications:  Admelog 100 units/mL injectable solution: injectable 3 times a day based on fingerstick (20 Apr 2023 05:56)  amLODIPine 5 mg oral tablet: 1 orally once a day (20 Apr 2023 05:56)  aspirin 81 mg oral tablet: 1 tab(s) orally once a day (20 Apr 2023 05:56)  atorvastatin 80 mg oral tablet: 1 orally once a day (20 Apr 2023 05:56)  Basaglar KwikPen 100 units/mL subcutaneous solution: 12 unit(s) subcutaneous once a day (at bedtime) (20 Apr 2023 05:56)  Basaglar KwikPen 100 units/mL subcutaneous solution: subcutaneous once a day (20 Apr 2023 05:56)  Calcium 600+D 600 mg-200 intl units oral tablet: 1 tab(s) orally 2 times a day (20 Apr 2023 05:56)  enalapril 20 mg oral tablet: 1 orally once a day (20 Apr 2023 05:56)  ferrous sulfate 325 mg (65 mg elemental iron) oral delayed release tablet: 1 tab(s) orally once a day (20 Apr 2023 05:56)  levothyroxine 137 mcg (0.137 mg) oral tablet: 1 tab(s) orally once a day (20 Apr 2023 05:56)  metoprolol succinate 50 mg oral tablet, extended release: 1 orally once a day (20 Apr 2023 05:56)  Pepcid 40 mg oral tablet: 1 tab(s) orally once a day (20 Apr 2023 05:56)  Plavix 75 mg oral tablet: 1 orally once a day (20 Apr 2023 05:56)  Tums 500 mg oral tablet, chewable: 1 tab(s) chewed prn as needed for  heartburn (20 Apr 2023 05:56)  Vitamin D2 50,000 intl units (1.25 mg) oral capsule: 1 cap(s) orally once a week (20 Apr 2023 05:56)            MEDICATIONS  (STANDING):  acetaminophen     Tablet .. 650 milliGRAM(s) Oral every 6 hours  aMIOdarone    Tablet 400 milliGRAM(s) Oral two times a day  ascorbic acid 500 milliGRAM(s) Oral two times a day  aspirin enteric coated 81 milliGRAM(s) Oral daily  atorvastatin 80 milliGRAM(s) Oral at bedtime  cefuroxime  IVPB 1500 milliGRAM(s) IV Intermittent every 8 hours  chlorhexidine 2% Cloths 1 Application(s) Topical daily  dextrose 50% Injectable 50 milliLiter(s) IV Push every 15 minutes  enoxaparin Injectable 40 milliGRAM(s) SubCutaneous every 24 hours  gabapentin 100 milliGRAM(s) Oral every 8 hours  insulin glargine Injectable (LANTUS) 45 Unit(s) SubCutaneous at bedtime  insulin lispro (ADMELOG) corrective regimen sliding scale   SubCutaneous three times a day before meals  insulin lispro (ADMELOG) corrective regimen sliding scale   SubCutaneous at bedtime  insulin lispro Injectable (ADMELOG) 13 Unit(s) SubCutaneous three times a day before meals  insulin regular Infusion 3 Unit(s)/Hr (3 mL/Hr) IV Continuous <Continuous>  levothyroxine 137 MICROGram(s) Oral daily  metoprolol tartrate 12.5 milliGRAM(s) Oral two times a day  pantoprazole    Tablet 40 milliGRAM(s) Oral before breakfast  polyethylene glycol 3350 17 Gram(s) Oral daily  potassium chloride  10 mEq/50 mL IVPB 10 milliEquivalent(s) IV Intermittent every 1 hour  potassium chloride  10 mEq/50 mL IVPB 10 milliEquivalent(s) IV Intermittent every 1 hour  potassium chloride  10 mEq/50 mL IVPB 10 milliEquivalent(s) IV Intermittent every 1 hour  senna 2 Tablet(s) Oral at bedtime  sodium chloride 0.9%. 1000 milliLiter(s) (10 mL/Hr) IV Continuous <Continuous>    MEDICATIONS  (PRN):  HYDROmorphone  Injectable 0.5 milliGRAM(s) IV Push every 6 hours PRN Breakthrough Pain  oxyCODONE    IR 5 milliGRAM(s) Oral every 4 hours PRN Moderate Pain (4 - 6)  oxyCODONE    IR 10 milliGRAM(s) Oral every 4 hours PRN Severe Pain (7 - 10)      Allergies    No Known Allergies    Intolerances    adhesives (Urticaria)      Review of Systems:  Neuro: No HA, no dizziness  Cardiovascular: No chest pain, no palpitations  Respiratory: no SOB, no cough  GI: No nausea, vomiting, abdominal pain  MSK: Denies joint/muscle pain      ALL OTHER SYSTEMS REVIEWED AND NEGATIVE        PHYSICAL EXAM:  VITALS: T(C): 37 (04-21-23 @ 12:00)  T(F): 98.6 (04-21-23 @ 12:00), Max: 99 (04-21-23 @ 00:00)  HR: 76 (04-21-23 @ 14:00) (76 - 96)  BP: 107/54 (04-21-23 @ 14:00) (101/52 - 138/60)  RR:  (5 - 31)  SpO2:  (89% - 100%)  Wt(kg): --  GENERAL: NAD, well-groomed, well-developed  NEURO:  alert and oriented  RESPIRATORY: Clear to auscultation bilaterally; No rales, rhonchi, wheezing  CARDIOVASCULAR: Si S2  GI: Soft, non distended, normal bowel sounds  MUSCULOSKELETAL: Moves all extremities equally       POCT Blood Glucose.: 191 mg/dL (04-21-23 @ 14:11)  POCT Blood Glucose.: 193 mg/dL (04-21-23 @ 13:15)  POCT Blood Glucose.: 206 mg/dL (04-21-23 @ 11:13)  POCT Blood Glucose.: 202 mg/dL (04-21-23 @ 10:12)  POCT Blood Glucose.: 209 mg/dL (04-21-23 @ 09:21)  POCT Blood Glucose.: 81 mg/dL (04-21-23 @ 09:19)  POCT Blood Glucose.: 110 mg/dL (04-21-23 @ 07:58)  POCT Blood Glucose.: 184 mg/dL (04-21-23 @ 07:00)  POCT Blood Glucose.: 120 mg/dL (04-21-23 @ 06:20)  POCT Blood Glucose.: 119 mg/dL (04-21-23 @ 05:18)  POCT Blood Glucose.: 128 mg/dL (04-21-23 @ 03:55)  POCT Blood Glucose.: 85 mg/dL (04-21-23 @ 03:22)  POCT Blood Glucose.: 66 mg/dL (04-21-23 @ 02:52)  POCT Blood Glucose.: 71 mg/dL (04-21-23 @ 02:10)  POCT Blood Glucose.: 120 mg/dL (04-21-23 @ 01:15)  POCT Blood Glucose.: 158 mg/dL (04-21-23 @ 00:05)  POCT Blood Glucose.: 169 mg/dL (04-20-23 @ 23:02)  POCT Blood Glucose.: 93 mg/dL (04-20-23 @ 21:51)  POCT Blood Glucose.: 101 mg/dL (04-20-23 @ 21:20)  POCT Blood Glucose.: 112 mg/dL (04-20-23 @ 19:07)  POCT Blood Glucose.: 149 mg/dL (04-20-23 @ 16:57)  POCT Blood Glucose.: 191 mg/dL (04-20-23 @ 15:51)  POCT Blood Glucose.: 181 mg/dL (04-20-23 @ 14:50)  POCT Blood Glucose.: 99 mg/dL (04-20-23 @ 06:01)                            9.0    20.10 )-----------( 151      ( 21 Apr 2023 01:26 )             26.2       04-21    142  |  106  |  10  ----------------------------<  111<H>  4.3   |  23  |  0.46<L>    eGFR: 114    Ca    8.3<L>      04-21  Mg     2.2     04-21  Phos  2.3     04-21    TPro  5.6<L>  /  Alb  4.3  /  TBili  0.6  /  DBili  x   /  AST  43<H>  /  ALT  23  /  AlkPhos  50  04-21      Thyroid Function Tests:  04-20 @ 22:16 TSH 1.68 FreeT4 -- T3 52 Anti TPO -- Anti Thyroglobulin Ab -- TSI --    Diet, Consistent Carbohydrate/No Snacks (04-20-23 @ 06:06) [Active]          A1C with Estimated Average Glucose Result: 7.2 % (04-17-23 @ 14:50)    < from: US Thyroid + Parathyroid (09.15.18 @ 12:26) >  INTERPRETATION:  CLINICAL INFORMATION: Hashimoto's thyroiditis.    COMPARISON: None available.    TECHNIQUE: Sonography of the thyroid.     FINDINGS:    Right Lobe: 4.0 x 1.3 x 0.9 cm. Mildly atrophic and diffusely   heterogeneous in appearance suggestive of Hashimoto's thyroiditis. No   dominant or suspicious lesions.    Left Lobe: 3.5 x 1.2 x 1.1 cm. Mildly atrophic and diffusely   heterogeneous in appearance suggestive of Hashimoto's thyroiditis. No   dominant or suspicious lesions.    Isthmus: 3 mm.        Cervical Lymph Nodes: No enlarged or abnormal morphology cervical nodes.    IMPRESSION:     Diffusely atrophic and heterogeneous thyroid gland consistent with   Hashimoto's thyroiditis. No dominant or suspicious lesions.              < end of copied text >

## 2023-04-21 NOTE — CONSULT NOTE ADULT - ASSESSMENT
Assessment  DMT1: 52y Female with DM T1 with hyperglycemia, A1C 7.2%, was on insulin at home (twice daily basal + bolus insulin), now s/p cardiac surgery. Postop requiring insulin drip. Extubated and eating meals, being transitioned to basal bolus.   Will need tight glycemic control for postop wound healing.   CAD: on medications, stable, monitored. s/p CABG  Hypothyroidism: On Synthroid 137mcg po daily, compliant with Synthroid intake, asymptomatic. Full TFT pending, TSH WNL(1.68). Low total t4, low t3. Free thyroxine pending.       Discussed plan and management wit Dr Sb Basurto MD  Cell: [phone]  Office: [phone]               Assessment  DMT1: 52y Female with DM T1 with hyperglycemia, A1C 7.2%, was on insulin at home (twice daily basal + bolus insulin), now s/p cardiac surgery. Postop requiring  insulin drip. Extubated and eating meals, being transitioned to basal bolus.   Will need tight glycemic control for postop wound healing.   CAD: on medications, stable, monitored. s/p CABG  Hypothyroidism: On Synthroid 137mcg po daily, compliant with Synthroid intake, asymptomatic. Full TFT pending, TSH WNL(1.68). Low total t4, low t3. Free thyroxine pending.       Discussed plan and management wit Dr Sb Basurto MD  Cell: [phone]  Office: [phone]

## 2023-04-21 NOTE — BH CONSULTATION LIAISON ASSESSMENT NOTE - NSSUICPROTFACT_PSY_ALL_CORE
intact Responsibility to children, family, or others/Identifies reasons for living/Supportive social network of family or friends/Positive therapeutic relationships/Ability to cope with stress Responsibility to children, family, or others/Identifies reasons for living/Supportive social network of family or friends/Engaged in work or school/Positive therapeutic relationships/Ability to cope with stress

## 2023-04-21 NOTE — BH CONSULTATION LIAISON ASSESSMENT NOTE - RISK ASSESSMENT
Protective factors: no current SIIP/HIIP, no h/o SA/SIB, no h/o psych admissions, no access to weapons, domiciled, intact marriage, social supports, engaged in treatment, compliant with treatment, help-seeking behaviors, future-orientation  Overall, pt is at low acute risk of harm and does not meet criteria for psychiatric admission at this time.   Risk factors: Anxiety, family stressors, acute/chronic medical conditions  Protective factors: no current SIIP/HIIP, no h/o SA/SIB, no h/o psych admissions, no access to weapons, domiciled, intact marriage, social supports, engaged in treatment, compliant with treatment, help-seeking behaviors, future-orientation  Overall, pt is at low acute risk of harm and does not meet criteria for psychiatric admission at this time.

## 2023-04-21 NOTE — BH CONSULTATION LIAISON ASSESSMENT NOTE - NSBHCHARTREVIEWVS_PSY_A_CORE FT
Vital Signs Last 24 Hrs  T(C): 37.2 (21 Apr 2023 08:00), Max: 37.2 (21 Apr 2023 00:00)  T(F): 99 (21 Apr 2023 08:00), Max: 99 (21 Apr 2023 00:00)  HR: 88 (21 Apr 2023 10:00) (75 - 96)  BP: 105/56 (21 Apr 2023 10:00) (101/52 - 138/60)  BP(mean): 77 (21 Apr 2023 10:00) (70 - 87)  RR: 17 (21 Apr 2023 10:00) (5 - 31)  SpO2: 97% (21 Apr 2023 10:00) (93% - 100%)    Parameters below as of 21 Apr 2023 08:00  Patient On (Oxygen Delivery Method): nasal cannula  O2 Flow (L/min): 2

## 2023-04-21 NOTE — BH CONSULTATION LIAISON ASSESSMENT NOTE - NSBHMSEEYE_PSY_A_CORE
Display Individual Monthly Dosage In The Note (If Yes Will Display All Dosages Which Are Not N/A): yes Are Labs Available For Review?: No- Labs Defered This Month Pounds Preamble Statement (Weight Entered In Details Tab): Reported Weight in pounds: Dosing Month 2 (Required For Cumulative Dosing): 40mg BID Detail Level: Zone Completed Therapy?: No Female Completion Statement: After discussing her treatment course we decided to discontinue isotretinoin therapy at this time. I explained that she would need to continue her birth control methods for at least one month after the last dosage. She should also get a pregnancy test one month after the last dose. She shouldn't donate blood for one month after the last dose. She should call with any new symptoms of depression. Weight Units: pounds Male Completion Statement: After discussing his treatment course we decided to discontinue isotretinoin therapy at this time. He shouldn't donate blood for one month after the last dose. He should call with any new symptoms of depression. Months Of Therapy Completed: 3 Kilograms Preamble Statement (Weight Entered In Details Tab): Reported Weight in kilograms: Fair Good

## 2023-04-21 NOTE — BH CONSULTATION LIAISON ASSESSMENT NOTE - SUMMARY
52 year old female, , employed at an  firm, domiciled at a home in [place] with her  & 3 adult daughters, with no PPHx or psychiatric admissions, denies substance abuse, current cigarette user (35yrs x 1-3 cigarettes/day), PMH significant for IDDM, HTN, CAD s/p cardiac stents, presenting for scheduled CABG, psychiatry consulted for anxiety. Patient seen at bedside, calm & cooperative, AOx4. Reports an increase in "anxiety attacks" over the past 6 months, due to personal stressors. Denies pSA, SI/HI, paranoia, delusions, hallucinations, anhedonia, depressed mood, insomnia, change in appetite or energy.    52F, , has adult children, employed at an accounting firm, domiciled at a home in [place] with her  & 3 adult daughters, with no formal PPHx or psychiatric admissions/SA, denies substance abuse, current cigarette user (35yrs x 1-3 cigarettes/day), PMH significant for IDDM, HTN, CAD s/p cardiac stents, presenting for scheduled CABG, psychiatry consulted for anxiety. Patient seen at bedside, calm & cooperative, AOx4. Her  was diagnosed with prostate cancer with mets in 2017, and later found to have papillary thyroid cancer. She reports having "anxiety attacks" consisting of ruminating, over thinking, cold sweats & palpitations 1-2 x a week over the past 6 months. Denies pSA, SI/HI, paranoia, delusions, hallucinations, anhedonia, depressed mood, insomnia, change in appetite or energy.  States she would be interested in trying as needed medication for her anxiety.

## 2023-04-21 NOTE — BH CONSULTATION LIAISON ASSESSMENT NOTE - CURRENT MEDICATION
MEDICATIONS  (STANDING):  acetaminophen     Tablet .. 650 milliGRAM(s) Oral every 6 hours  aMIOdarone    Tablet 400 milliGRAM(s) Oral two times a day  ascorbic acid 500 milliGRAM(s) Oral two times a day  aspirin enteric coated 81 milliGRAM(s) Oral daily  atorvastatin 80 milliGRAM(s) Oral at bedtime  cefuroxime  IVPB 1500 milliGRAM(s) IV Intermittent every 8 hours  chlorhexidine 2% Cloths 1 Application(s) Topical daily  dextrose 50% Injectable 50 milliLiter(s) IV Push every 15 minutes  enoxaparin Injectable 40 milliGRAM(s) SubCutaneous every 24 hours  gabapentin 100 milliGRAM(s) Oral every 8 hours  insulin regular Infusion 3 Unit(s)/Hr (3 mL/Hr) IV Continuous <Continuous>  levothyroxine 137 MICROGram(s) Oral daily  metoprolol tartrate 12.5 milliGRAM(s) Oral two times a day  pantoprazole    Tablet 40 milliGRAM(s) Oral before breakfast  polyethylene glycol 3350 17 Gram(s) Oral daily  potassium chloride  10 mEq/50 mL IVPB 10 milliEquivalent(s) IV Intermittent every 1 hour  potassium chloride  10 mEq/50 mL IVPB 10 milliEquivalent(s) IV Intermittent every 1 hour  potassium chloride  10 mEq/50 mL IVPB 10 milliEquivalent(s) IV Intermittent every 1 hour  senna 2 Tablet(s) Oral at bedtime  sodium chloride 0.9%. 1000 milliLiter(s) (10 mL/Hr) IV Continuous <Continuous>    MEDICATIONS  (PRN):  HYDROmorphone  Injectable 0.5 milliGRAM(s) IV Push every 6 hours PRN Breakthrough Pain  oxyCODONE    IR 5 milliGRAM(s) Oral every 4 hours PRN Moderate Pain (4 - 6)  oxyCODONE    IR 10 milliGRAM(s) Oral every 4 hours PRN Severe Pain (7 - 10)

## 2023-04-21 NOTE — CONSULT NOTE ADULT - PROBLEM SELECTOR RECOMMENDATION 9
Will start Lantus 45 units at bed time.  Will start Admelog 13 units before each meal in addition to Admelog correction scale coverage.  Will continue monitoring FS, log, and glucose trends, will Follow up.  Patient counseled for compliance with consistent low carb diet and exercise as tolerated outpatient. Will start Lantus 48 units at bed time.  Will start Admelog 15 units before each meal in addition to Admelog correction scale coverage.  Will continue monitoring FS, log, and glucose trends, will Follow up.  Patient counseled for compliance with consistent low carb diet and exercise as tolerated outpatient.

## 2023-04-21 NOTE — BH CONSULTATION LIAISON ASSESSMENT NOTE - DESCRIPTION
52 year old female, , domiciled in a private home in [place] with her  & 3 adult daughters, currently employed at an  firm. No PPHx or psychiatric admissions, denies substance abuse, current cigarette user (35yrs x 1-3 cigarettes/day). Denies any history of abuse, violence, or arrest. No access to firearms at home.

## 2023-04-21 NOTE — PHYSICAL THERAPY INITIAL EVALUATION ADULT - ADDITIONAL COMMENTS
Pt lives w/ spouse and dtr in a pvt 1 st floor house w/ 3 steps to enter. PTA indep w/ all ADLs w/o an assist device.

## 2023-04-21 NOTE — CONSULT NOTE ADULT - NS ATTEND AMEND GEN_ALL_CORE FT
Chart, labs, vitals, radiology reviewed. Above H&P reviewed and edited where appropriate. Agree with history and physical exam. Agree with assessment and plan. I reviewed the overnight course of events and discussed the care with the patient/ family.  All the decisions in assessment and plan are solely made by me..

## 2023-04-21 NOTE — BH CONSULTATION LIAISON ASSESSMENT NOTE - NSBHCONSULTRECOMMENDOTHER_PSY_A_CORE FT
- Xanax 0.25mg-0.5mg PO PRN q8hrs  - Follow up with an outpatient psychiatrist/therapist - Xanax 0.25mg PO BID PRN anxiety     Check TSH, B12, folate    holistic RN marina miller if pt amenable

## 2023-04-21 NOTE — BH CONSULTATION LIAISON ASSESSMENT NOTE - NSBHATTESTCOMMENTATTENDFT_PSY_A_CORE
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  Pt on interview AOx3, calm and cooperative, states she has been having increased anxiety in past 6 months in s/o  with stage 4 prostate ca with cognitive decline, as well as her own medical stressors.  States anxiety presents with episodes of anxiety attacks characterized by chest tightness, SOB lasting about 20 minutes roughly twice a week.  Denies SI/HI, depression, substance abuse.  Amenable to medication on PRN basis.  dx: adjustment d/o with anxiety.  Recs: Xanax PRN as above, SW for OP referral resources.  Agree with trainee's assessment and plan as above.

## 2023-04-21 NOTE — CONSULT NOTE ADULT - PROBLEM SELECTOR RECOMMENDATION 3
Will get full thyroid panel, Will continue current Synthroid dose, will FU.  Will adjust pending full TFT  Suggest to repeat thyroid function test in 4-6 weeks. Outpatient follow up.

## 2023-04-22 ENCOUNTER — TRANSCRIPTION ENCOUNTER (OUTPATIENT)
Age: 53
End: 2023-04-22

## 2023-04-22 DIAGNOSIS — E11.9 TYPE 2 DIABETES MELLITUS WITHOUT COMPLICATIONS: ICD-10-CM

## 2023-04-22 DIAGNOSIS — Z95.1 PRESENCE OF AORTOCORONARY BYPASS GRAFT: ICD-10-CM

## 2023-04-22 LAB
ALBUMIN SERPL ELPH-MCNC: 3.9 G/DL — SIGNIFICANT CHANGE UP (ref 3.3–5)
ALP SERPL-CCNC: 51 U/L — SIGNIFICANT CHANGE UP (ref 40–120)
ALT FLD-CCNC: 25 U/L — SIGNIFICANT CHANGE UP (ref 10–45)
ANION GAP SERPL CALC-SCNC: 8 MMOL/L — SIGNIFICANT CHANGE UP (ref 5–17)
AST SERPL-CCNC: 29 U/L — SIGNIFICANT CHANGE UP (ref 10–40)
BASOPHILS # BLD AUTO: 0.02 K/UL — SIGNIFICANT CHANGE UP (ref 0–0.2)
BASOPHILS NFR BLD AUTO: 0.1 % — SIGNIFICANT CHANGE UP (ref 0–2)
BILIRUB SERPL-MCNC: 0.3 MG/DL — SIGNIFICANT CHANGE UP (ref 0.2–1.2)
BUN SERPL-MCNC: 12 MG/DL — SIGNIFICANT CHANGE UP (ref 7–23)
CALCIUM SERPL-MCNC: 8.7 MG/DL — SIGNIFICANT CHANGE UP (ref 8.4–10.5)
CHLORIDE SERPL-SCNC: 104 MMOL/L — SIGNIFICANT CHANGE UP (ref 96–108)
CO2 SERPL-SCNC: 26 MMOL/L — SIGNIFICANT CHANGE UP (ref 22–31)
CREAT SERPL-MCNC: 0.56 MG/DL — SIGNIFICANT CHANGE UP (ref 0.5–1.3)
EGFR: 109 ML/MIN/1.73M2 — SIGNIFICANT CHANGE UP
EOSINOPHIL # BLD AUTO: 0 K/UL — SIGNIFICANT CHANGE UP (ref 0–0.5)
EOSINOPHIL NFR BLD AUTO: 0 % — SIGNIFICANT CHANGE UP (ref 0–6)
GLUCOSE BLDC GLUCOMTR-MCNC: 100 MG/DL — HIGH (ref 70–99)
GLUCOSE BLDC GLUCOMTR-MCNC: 110 MG/DL — HIGH (ref 70–99)
GLUCOSE BLDC GLUCOMTR-MCNC: 121 MG/DL — HIGH (ref 70–99)
GLUCOSE BLDC GLUCOMTR-MCNC: 123 MG/DL — HIGH (ref 70–99)
GLUCOSE BLDC GLUCOMTR-MCNC: 123 MG/DL — HIGH (ref 70–99)
GLUCOSE BLDC GLUCOMTR-MCNC: 133 MG/DL — HIGH (ref 70–99)
GLUCOSE BLDC GLUCOMTR-MCNC: 133 MG/DL — HIGH (ref 70–99)
GLUCOSE BLDC GLUCOMTR-MCNC: 167 MG/DL — HIGH (ref 70–99)
GLUCOSE BLDC GLUCOMTR-MCNC: 176 MG/DL — HIGH (ref 70–99)
GLUCOSE BLDC GLUCOMTR-MCNC: 185 MG/DL — HIGH (ref 70–99)
GLUCOSE BLDC GLUCOMTR-MCNC: 203 MG/DL — HIGH (ref 70–99)
GLUCOSE BLDC GLUCOMTR-MCNC: 208 MG/DL — HIGH (ref 70–99)
GLUCOSE BLDC GLUCOMTR-MCNC: 211 MG/DL — HIGH (ref 70–99)
GLUCOSE BLDC GLUCOMTR-MCNC: 216 MG/DL — HIGH (ref 70–99)
GLUCOSE BLDC GLUCOMTR-MCNC: 221 MG/DL — HIGH (ref 70–99)
GLUCOSE BLDC GLUCOMTR-MCNC: 240 MG/DL — HIGH (ref 70–99)
GLUCOSE BLDC GLUCOMTR-MCNC: 241 MG/DL — HIGH (ref 70–99)
GLUCOSE BLDC GLUCOMTR-MCNC: 99 MG/DL — SIGNIFICANT CHANGE UP (ref 70–99)
GLUCOSE SERPL-MCNC: 92 MG/DL — SIGNIFICANT CHANGE UP (ref 70–99)
HCT VFR BLD CALC: 24.7 % — LOW (ref 34.5–45)
HGB BLD-MCNC: 8.2 G/DL — LOW (ref 11.5–15.5)
IMM GRANULOCYTES NFR BLD AUTO: 0.7 % — SIGNIFICANT CHANGE UP (ref 0–0.9)
LYMPHOCYTES # BLD AUTO: 1 K/UL — SIGNIFICANT CHANGE UP (ref 1–3.3)
LYMPHOCYTES # BLD AUTO: 4.5 % — LOW (ref 13–44)
MAGNESIUM SERPL-MCNC: 2.4 MG/DL — SIGNIFICANT CHANGE UP (ref 1.6–2.6)
MCHC RBC-ENTMCNC: 31.9 PG — SIGNIFICANT CHANGE UP (ref 27–34)
MCHC RBC-ENTMCNC: 33.2 GM/DL — SIGNIFICANT CHANGE UP (ref 32–36)
MCV RBC AUTO: 96.1 FL — SIGNIFICANT CHANGE UP (ref 80–100)
MONOCYTES # BLD AUTO: 1.56 K/UL — HIGH (ref 0–0.9)
MONOCYTES NFR BLD AUTO: 7 % — SIGNIFICANT CHANGE UP (ref 2–14)
NEUTROPHILS # BLD AUTO: 19.44 K/UL — HIGH (ref 1.8–7.4)
NEUTROPHILS NFR BLD AUTO: 87.7 % — HIGH (ref 43–77)
NRBC # BLD: 0 /100 WBCS — SIGNIFICANT CHANGE UP (ref 0–0)
PHOSPHATE SERPL-MCNC: 2.3 MG/DL — LOW (ref 2.5–4.5)
PLATELET # BLD AUTO: 153 K/UL — SIGNIFICANT CHANGE UP (ref 150–400)
POTASSIUM SERPL-MCNC: 4.2 MMOL/L — SIGNIFICANT CHANGE UP (ref 3.5–5.3)
POTASSIUM SERPL-SCNC: 4.2 MMOL/L — SIGNIFICANT CHANGE UP (ref 3.5–5.3)
PROT SERPL-MCNC: 5.6 G/DL — LOW (ref 6–8.3)
RBC # BLD: 2.57 M/UL — LOW (ref 3.8–5.2)
RBC # FLD: 14.1 % — SIGNIFICANT CHANGE UP (ref 10.3–14.5)
SODIUM SERPL-SCNC: 138 MMOL/L — SIGNIFICANT CHANGE UP (ref 135–145)
T4 FREE SERPL-MCNC: 0.9 NG/DL — SIGNIFICANT CHANGE UP (ref 0.9–1.8)
TSH SERPL-MCNC: 1.51 UIU/ML — SIGNIFICANT CHANGE UP (ref 0.27–4.2)
WBC # BLD: 22.17 K/UL — HIGH (ref 3.8–10.5)
WBC # FLD AUTO: 22.17 K/UL — HIGH (ref 3.8–10.5)

## 2023-04-22 PROCEDURE — 99291 CRITICAL CARE FIRST HOUR: CPT

## 2023-04-22 PROCEDURE — 71045 X-RAY EXAM CHEST 1 VIEW: CPT | Mod: 26

## 2023-04-22 RX ORDER — SODIUM CHLORIDE 9 MG/ML
1000 INJECTION, SOLUTION INTRAVENOUS
Refills: 0 | Status: DISCONTINUED | OUTPATIENT
Start: 2023-04-22 | End: 2023-04-24

## 2023-04-22 RX ORDER — INSULIN GLARGINE 100 [IU]/ML
45 INJECTION, SOLUTION SUBCUTANEOUS AT BEDTIME
Refills: 0 | Status: DISCONTINUED | OUTPATIENT
Start: 2023-04-23 | End: 2023-04-23

## 2023-04-22 RX ORDER — FUROSEMIDE 40 MG
20 TABLET ORAL DAILY
Refills: 0 | Status: COMPLETED | OUTPATIENT
Start: 2023-04-22 | End: 2023-04-24

## 2023-04-22 RX ORDER — GLUCAGON INJECTION, SOLUTION 0.5 MG/.1ML
1 INJECTION, SOLUTION SUBCUTANEOUS ONCE
Refills: 0 | Status: DISCONTINUED | OUTPATIENT
Start: 2023-04-22 | End: 2023-04-24

## 2023-04-22 RX ORDER — METOPROLOL TARTRATE 50 MG
25 TABLET ORAL EVERY 12 HOURS
Refills: 0 | Status: DISCONTINUED | OUTPATIENT
Start: 2023-04-22 | End: 2023-04-24

## 2023-04-22 RX ORDER — INSULIN LISPRO 100/ML
23 VIAL (ML) SUBCUTANEOUS
Refills: 0 | Status: DISCONTINUED | OUTPATIENT
Start: 2023-04-22 | End: 2023-04-22

## 2023-04-22 RX ORDER — INSULIN GLARGINE 100 [IU]/ML
65 INJECTION, SOLUTION SUBCUTANEOUS AT BEDTIME
Refills: 0 | Status: DISCONTINUED | OUTPATIENT
Start: 2023-04-22 | End: 2023-04-22

## 2023-04-22 RX ORDER — INSULIN GLARGINE 100 [IU]/ML
60 INJECTION, SOLUTION SUBCUTANEOUS AT BEDTIME
Refills: 0 | Status: DISCONTINUED | OUTPATIENT
Start: 2023-04-22 | End: 2023-04-22

## 2023-04-22 RX ORDER — INSULIN LISPRO 100/ML
26 VIAL (ML) SUBCUTANEOUS
Refills: 0 | Status: DISCONTINUED | OUTPATIENT
Start: 2023-04-22 | End: 2023-04-24

## 2023-04-22 RX ORDER — INSULIN GLARGINE 100 [IU]/ML
45 INJECTION, SOLUTION SUBCUTANEOUS ONCE
Refills: 0 | Status: COMPLETED | OUTPATIENT
Start: 2023-04-22 | End: 2023-04-22

## 2023-04-22 RX ORDER — SPIRONOLACTONE 25 MG/1
25 TABLET, FILM COATED ORAL DAILY
Refills: 0 | Status: DISCONTINUED | OUTPATIENT
Start: 2023-04-22 | End: 2023-04-24

## 2023-04-22 RX ADMIN — OXYCODONE HYDROCHLORIDE 10 MILLIGRAM(S): 5 TABLET ORAL at 17:15

## 2023-04-22 RX ADMIN — GABAPENTIN 100 MILLIGRAM(S): 400 CAPSULE ORAL at 22:19

## 2023-04-22 RX ADMIN — Medication 20 MILLIGRAM(S): at 12:00

## 2023-04-22 RX ADMIN — Medication 4: at 12:06

## 2023-04-22 RX ADMIN — Medication 650 MILLIGRAM(S): at 18:30

## 2023-04-22 RX ADMIN — Medication 81 MILLIGRAM(S): at 12:00

## 2023-04-22 RX ADMIN — Medication 500 MILLIGRAM(S): at 05:59

## 2023-04-22 RX ADMIN — OXYCODONE HYDROCHLORIDE 5 MILLIGRAM(S): 5 TABLET ORAL at 03:00

## 2023-04-22 RX ADMIN — Medication 650 MILLIGRAM(S): at 00:07

## 2023-04-22 RX ADMIN — Medication 25 MILLIGRAM(S): at 06:02

## 2023-04-22 RX ADMIN — AMIODARONE HYDROCHLORIDE 400 MILLIGRAM(S): 400 TABLET ORAL at 05:59

## 2023-04-22 RX ADMIN — GABAPENTIN 100 MILLIGRAM(S): 400 CAPSULE ORAL at 05:59

## 2023-04-22 RX ADMIN — Medication 26 UNIT(S): at 18:31

## 2023-04-22 RX ADMIN — GABAPENTIN 100 MILLIGRAM(S): 400 CAPSULE ORAL at 13:21

## 2023-04-22 RX ADMIN — Medication 650 MILLIGRAM(S): at 01:00

## 2023-04-22 RX ADMIN — ATORVASTATIN CALCIUM 80 MILLIGRAM(S): 80 TABLET, FILM COATED ORAL at 22:19

## 2023-04-22 RX ADMIN — Medication 4: at 18:30

## 2023-04-22 RX ADMIN — ENOXAPARIN SODIUM 40 MILLIGRAM(S): 100 INJECTION SUBCUTANEOUS at 13:21

## 2023-04-22 RX ADMIN — CHLORHEXIDINE GLUCONATE 1 APPLICATION(S): 213 SOLUTION TOPICAL at 12:11

## 2023-04-22 RX ADMIN — OXYCODONE HYDROCHLORIDE 10 MILLIGRAM(S): 5 TABLET ORAL at 16:28

## 2023-04-22 RX ADMIN — AMIODARONE HYDROCHLORIDE 400 MILLIGRAM(S): 400 TABLET ORAL at 18:30

## 2023-04-22 RX ADMIN — Medication 650 MILLIGRAM(S): at 11:59

## 2023-04-22 RX ADMIN — Medication 650 MILLIGRAM(S): at 05:59

## 2023-04-22 RX ADMIN — Medication 25 MILLIGRAM(S): at 18:30

## 2023-04-22 RX ADMIN — Medication 650 MILLIGRAM(S): at 06:00

## 2023-04-22 RX ADMIN — CLOPIDOGREL BISULFATE 75 MILLIGRAM(S): 75 TABLET, FILM COATED ORAL at 12:00

## 2023-04-22 RX ADMIN — Medication 23 UNIT(S): at 12:07

## 2023-04-22 RX ADMIN — INSULIN GLARGINE 45 UNIT(S): 100 INJECTION, SOLUTION SUBCUTANEOUS at 23:04

## 2023-04-22 RX ADMIN — Medication 137 MICROGRAM(S): at 05:58

## 2023-04-22 RX ADMIN — Medication 100 MILLIGRAM(S): at 00:07

## 2023-04-22 RX ADMIN — Medication 63.75 MILLIMOLE(S): at 04:44

## 2023-04-22 RX ADMIN — OXYCODONE HYDROCHLORIDE 5 MILLIGRAM(S): 5 TABLET ORAL at 02:15

## 2023-04-22 RX ADMIN — Medication 650 MILLIGRAM(S): at 13:21

## 2023-04-22 RX ADMIN — PANTOPRAZOLE SODIUM 40 MILLIGRAM(S): 20 TABLET, DELAYED RELEASE ORAL at 07:20

## 2023-04-22 RX ADMIN — Medication 500 MILLIGRAM(S): at 18:30

## 2023-04-22 RX ADMIN — SPIRONOLACTONE 25 MILLIGRAM(S): 25 TABLET, FILM COATED ORAL at 12:04

## 2023-04-22 NOTE — PROVIDER CONTACT NOTE (MEDICATION) - SITUATION
Pt remains on insulin gtt and is requesting Pre meal coverage to maintain appropriate blood glucose level. Pt refuses to eat due to fear of hyperglycemia.  Request made to MEGAN Lal to administer ordered pre meal dose of 15 units of Humalog.

## 2023-04-22 NOTE — PROGRESS NOTE ADULT - SUBJECTIVE AND OBJECTIVE BOX
Chief complaint    Patient is a 52y old  Female who presents with a chief complaint of s/p CABGx3 (22 Apr 2023 15:08)   Review of systems  Patient appears comfortable.    Labs and Fingersticks  CAPILLARY BLOOD GLUCOSE  221 (22 Apr 2023 13:00)  208 (22 Apr 2023 12:00)  211 (22 Apr 2023 11:00)  241 (22 Apr 2023 10:00)  216 (22 Apr 2023 09:00)  100 (22 Apr 2023 08:00)  221 (21 Apr 2023 19:00)  216 (21 Apr 2023 18:00)      POCT Blood Glucose.: 221 mg/dL (22 Apr 2023 13:17)  POCT Blood Glucose.: 208 mg/dL (22 Apr 2023 11:39)  POCT Blood Glucose.: 211 mg/dL (22 Apr 2023 10:48)  POCT Blood Glucose.: 241 mg/dL (22 Apr 2023 09:57)  POCT Blood Glucose.: 216 mg/dL (22 Apr 2023 09:10)  POCT Blood Glucose.: 100 mg/dL (22 Apr 2023 07:21)  POCT Blood Glucose.: 121 mg/dL (22 Apr 2023 06:46)  POCT Blood Glucose.: 240 mg/dL (22 Apr 2023 06:04)  POCT Blood Glucose.: 123 mg/dL (22 Apr 2023 05:19)  POCT Blood Glucose.: 133 mg/dL (22 Apr 2023 04:01)  POCT Blood Glucose.: 176 mg/dL (22 Apr 2023 02:53)  POCT Blood Glucose.: 185 mg/dL (22 Apr 2023 01:34)  POCT Blood Glucose.: 99 mg/dL (21 Apr 2023 23:52)  POCT Blood Glucose.: 110 mg/dL (21 Apr 2023 22:57)  POCT Blood Glucose.: 123 mg/dL (21 Apr 2023 22:07)  POCT Blood Glucose.: 133 mg/dL (21 Apr 2023 21:11)  POCT Blood Glucose.: 153 mg/dL (21 Apr 2023 20:20)  POCT Blood Glucose.: 174 mg/dL (21 Apr 2023 19:44)  POCT Blood Glucose.: 221 mg/dL (21 Apr 2023 18:49)  POCT Blood Glucose.: 216 mg/dL (21 Apr 2023 17:53)      Anion Gap, Serum: 8 (04-22 @ 00:10)  Anion Gap, Serum: 13 (04-21 @ 01:26)      Calcium, Total Serum: 8.7 (04-22 @ 00:10)  Calcium, Total Serum: 8.3 *L* (04-21 @ 01:26)  Albumin, Serum: 3.9 (04-22 @ 00:10)  Albumin, Serum: 4.3 (04-21 @ 01:26)    Alanine Aminotransferase (ALT/SGPT): 25 (04-22 @ 00:10)  Alanine Aminotransferase (ALT/SGPT): 23 (04-21 @ 01:26)  Alkaline Phosphatase, Serum: 51 (04-22 @ 00:10)  Alkaline Phosphatase, Serum: 50 (04-21 @ 01:26)  Aspartate Aminotransferase (AST/SGOT): 29 (04-22 @ 00:10)  Aspartate Aminotransferase (AST/SGOT): 43 *H* (04-21 @ 01:26)        04-22    138  |  104  |  12  ----------------------------<  92  4.2   |  26  |  0.56    Ca    8.7      22 Apr 2023 00:10  Phos  2.3     04-22  Mg     2.4     04-22    TPro  5.6<L>  /  Alb  3.9  /  TBili  0.3  /  DBili  x   /  AST  29  /  ALT  25  /  AlkPhos  51  04-22                        8.2    22.17 )-----------( 153      ( 22 Apr 2023 00:10 )             24.7     Medications  MEDICATIONS  (STANDING):  acetaminophen     Tablet .. 650 milliGRAM(s) Oral every 6 hours  aMIOdarone    Tablet 400 milliGRAM(s) Oral two times a day  ascorbic acid 500 milliGRAM(s) Oral two times a day  aspirin enteric coated 81 milliGRAM(s) Oral daily  atorvastatin 80 milliGRAM(s) Oral at bedtime  bisacodyl Suppository 10 milliGRAM(s) Rectal once  chlorhexidine 2% Cloths 1 Application(s) Topical daily  clopidogrel Tablet 75 milliGRAM(s) Oral daily  dextrose 50% Injectable 50 milliLiter(s) IV Push every 15 minutes  enoxaparin Injectable 40 milliGRAM(s) SubCutaneous every 24 hours  furosemide    Tablet 20 milliGRAM(s) Oral daily  gabapentin 100 milliGRAM(s) Oral every 8 hours  insulin glargine Injectable (LANTUS) 65 Unit(s) SubCutaneous at bedtime  insulin lispro (ADMELOG) corrective regimen sliding scale   SubCutaneous three times a day before meals  insulin lispro (ADMELOG) corrective regimen sliding scale   SubCutaneous at bedtime  insulin lispro Injectable (ADMELOG) 26 Unit(s) SubCutaneous three times a day before meals  levothyroxine 137 MICROGram(s) Oral daily  metoprolol tartrate 25 milliGRAM(s) Oral every 12 hours  pantoprazole    Tablet 40 milliGRAM(s) Oral before breakfast  polyethylene glycol 3350 17 Gram(s) Oral daily  senna 2 Tablet(s) Oral at bedtime  sodium chloride 0.9%. 1000 milliLiter(s) (10 mL/Hr) IV Continuous <Continuous>  spironolactone 25 milliGRAM(s) Oral daily      Physical Exam  General: Patient appears comfortable.  Vital Signs Last 12 Hrs  T(F): 98.7 (04-22-23 @ 14:20), Max: 98.7 (04-22-23 @ 14:20)  HR: 82 (04-22-23 @ 14:20) (61 - 82)  BP: 138/64 (04-22-23 @ 14:20) (106/54 - 157/73)  BP(mean): 92 (04-22-23 @ 14:20) (76 - 105)  RR: 18 (04-22-23 @ 14:20) (1 - 21)  SpO2: 93% (04-22-23 @ 14:20) (77% - 98%)  Neck: No palpable thyroid nodules.  CVS: S1S2, No murmurs  Respiratory: No wheezing, no crepitations  GI: Abdomen soft, non tender.    Diagnostics        Radiology:

## 2023-04-22 NOTE — PROGRESS NOTE ADULT - SUBJECTIVE AND OBJECTIVE BOX
VITAL SIGNS    Telemetry:  SR 80's  Vital Signs Last 24 Hrs  T(C): 37.1 (04-22-23 @ 14:20), Max: 37.2 (04-21-23 @ 16:00)  T(F): 98.7 (04-22-23 @ 14:20), Max: 99 (04-21-23 @ 16:00)  HR: 82 (04-22-23 @ 14:20) (61 - 82)  BP: 138/64 (04-22-23 @ 14:20) (106/51 - 157/73)  RR: 18 (04-22-23 @ 14:20) (1 - 43)  SpO2: 93% (04-22-23 @ 14:20) (77% - 98%)            04-21 @ 07:01  -  04-22 @ 07:00  --------------------------------------------------------  IN: 739 mL / OUT: 1540 mL / NET: -801 mL    04-22 @ 07:01  -  04-22 @ 15:08  --------------------------------------------------------  IN: 208 mL / OUT: 1650 mL / NET: -1442 mL       Daily     Daily   Admit Wt: Drug Dosing Weight  Height (cm): 167.6 (20 Apr 2023 09:00)  Weight (kg): 86.7 (20 Apr 2023 09:00)  BMI (kg/m2): 30.9 (20 Apr 2023 09:00)  BSA (m2): 1.96 (20 Apr 2023 09:00)    Bilirubin Total, Serum: 0.3 mg/dL (04-22 @ 00:10)    CAPILLARY BLOOD GLUCOSE  221 (22 Apr 2023 13:00)  208 (22 Apr 2023 12:00)  211 (22 Apr 2023 11:00)  241 (22 Apr 2023 10:00)  216 (22 Apr 2023 09:00)  100 (22 Apr 2023 08:00)  221 (21 Apr 2023 19:00)  216 (21 Apr 2023 18:00)  139 (21 Apr 2023 16:00)      POCT Blood Glucose.: 221 mg/dL (22 Apr 2023 13:17)  POCT Blood Glucose.: 208 mg/dL (22 Apr 2023 11:39)  POCT Blood Glucose.: 211 mg/dL (22 Apr 2023 10:48)  POCT Blood Glucose.: 241 mg/dL (22 Apr 2023 09:57)  POCT Blood Glucose.: 216 mg/dL (22 Apr 2023 09:10)  POCT Blood Glucose.: 100 mg/dL (22 Apr 2023 07:21)  POCT Blood Glucose.: 121 mg/dL (22 Apr 2023 06:46)  POCT Blood Glucose.: 240 mg/dL (22 Apr 2023 06:04)  POCT Blood Glucose.: 123 mg/dL (22 Apr 2023 05:19)  POCT Blood Glucose.: 133 mg/dL (22 Apr 2023 04:01)  POCT Blood Glucose.: 176 mg/dL (22 Apr 2023 02:53)  POCT Blood Glucose.: 185 mg/dL (22 Apr 2023 01:34)  POCT Blood Glucose.: 99 mg/dL (21 Apr 2023 23:52)  POCT Blood Glucose.: 110 mg/dL (21 Apr 2023 22:57)  POCT Blood Glucose.: 123 mg/dL (21 Apr 2023 22:07)  POCT Blood Glucose.: 133 mg/dL (21 Apr 2023 21:11)  POCT Blood Glucose.: 153 mg/dL (21 Apr 2023 20:20)  POCT Blood Glucose.: 174 mg/dL (21 Apr 2023 19:44)  POCT Blood Glucose.: 221 mg/dL (21 Apr 2023 18:49)  POCT Blood Glucose.: 216 mg/dL (21 Apr 2023 17:53)  POCT Blood Glucose.: 139 mg/dL (21 Apr 2023 16:31)          MEDICATIONS  acetaminophen     Tablet .. 650 milliGRAM(s) Oral every 6 hours  aMIOdarone    Tablet 400 milliGRAM(s) Oral two times a day  ascorbic acid 500 milliGRAM(s) Oral two times a day  aspirin enteric coated 81 milliGRAM(s) Oral daily  atorvastatin 80 milliGRAM(s) Oral at bedtime  bisacodyl Suppository 10 milliGRAM(s) Rectal once  chlorhexidine 2% Cloths 1 Application(s) Topical daily  clopidogrel Tablet 75 milliGRAM(s) Oral daily  dextrose 50% Injectable 50 milliLiter(s) IV Push every 15 minutes  enoxaparin Injectable 40 milliGRAM(s) SubCutaneous every 24 hours  furosemide    Tablet 20 milliGRAM(s) Oral daily  gabapentin 100 milliGRAM(s) Oral every 8 hours  HYDROmorphone  Injectable 0.5 milliGRAM(s) IV Push every 6 hours PRN  insulin glargine Injectable (LANTUS) 60 Unit(s) SubCutaneous at bedtime  insulin lispro (ADMELOG) corrective regimen sliding scale   SubCutaneous three times a day before meals  insulin lispro (ADMELOG) corrective regimen sliding scale   SubCutaneous at bedtime  insulin lispro Injectable (ADMELOG) 23 Unit(s) SubCutaneous three times a day before meals  levothyroxine 137 MICROGram(s) Oral daily  metoprolol tartrate 25 milliGRAM(s) Oral every 12 hours  oxyCODONE    IR 5 milliGRAM(s) Oral every 4 hours PRN  oxyCODONE    IR 10 milliGRAM(s) Oral every 4 hours PRN  pantoprazole    Tablet 40 milliGRAM(s) Oral before breakfast  polyethylene glycol 3350 17 Gram(s) Oral daily  senna 2 Tablet(s) Oral at bedtime  sodium chloride 0.9%. 1000 milliLiter(s) IV Continuous <Continuous>  spironolactone 25 milliGRAM(s) Oral daily      >>> <<<  PHYSICAL EXAM  Subjective: " Hi, I feel okay"  Neurology: alert and oriented x 3, nonfocal, no gross deficits  CV : RRR S1S2, no murmurs, rubs or gallops   Sternal Wound :  CDI , Stable +PW  Lungs: CTA B/L, No wheezing, rales, rhonchi. Non labored respirations   Abdomen: soft, NT,ND, ( )BM  :  voiding  Extremities:  Trace LE edema bilaterally, +DP, No calf tenderness     LABS  04-22    138  |  104  |  12  ----------------------------<  92  4.2   |  26  |  0.56    Ca    8.7      22 Apr 2023 00:10  Phos  2.3     04-22  Mg     2.4     04-22    TPro  5.6<L>  /  Alb  3.9  /  TBili  0.3  /  DBili  x   /  AST  29  /  ALT  25  /  AlkPhos  51  04-22                                 8.2    22.17 )-----------( 153      ( 22 Apr 2023 00:10 )             24.7          PT/INR - ( 21 Apr 2023 01:26 )   PT: 12.5 sec;   INR: 1.09 ratio         PTT - ( 21 Apr 2023 01:26 )  PTT:23.7 sec       PAST MEDICAL & SURGICAL HISTORY:  Essential Hypertension      History of Hypothyroidism      Hypercholesteremia      CAD (Coronary Artery Disease)      Type 1 diabetes mellitus, with long-term current use of insulin  age    7      Stented coronary artery  2010 x 2 - [facility], 2012 X1 [facility]      ST elevation myocardial infarction involving right coronary artery  6/26/2010- S/P 2 STENTS [facility]      Vitamin D deficiency      Anemia due to blood loss  Menorrhagia- 2017 June - 2 U PRBC Placed, Mirena IUD placement 6/2017      Cervical dysplasia      2019 novel coronavirus disease (COVID-19)      Orthostatic dizziness      S/P coronary artery stent placement  2010 x 2, 2012 x 1      H/O dilation and curettage      History of hysteroscopy

## 2023-04-22 NOTE — PROVIDER CONTACT NOTE (MEDICATION) - ACTION/TREATMENT ORDERED:
As per MEGAN Lal, hold sliding scale and Pre meal for now, and states she will further discuss with patient.

## 2023-04-22 NOTE — PROGRESS NOTE ADULT - SUBJECTIVE AND OBJECTIVE BOX
CRITICAL CARE ATTENDING - CTICU    MEDICATIONS  (STANDING):  acetaminophen     Tablet .. 650 milliGRAM(s) Oral every 6 hours  aMIOdarone    Tablet 400 milliGRAM(s) Oral two times a day  ascorbic acid 500 milliGRAM(s) Oral two times a day  aspirin enteric coated 81 milliGRAM(s) Oral daily  atorvastatin 80 milliGRAM(s) Oral at bedtime  bisacodyl Suppository 10 milliGRAM(s) Rectal once  chlorhexidine 2% Cloths 1 Application(s) Topical daily  clopidogrel Tablet 75 milliGRAM(s) Oral daily  dextrose 50% Injectable 50 milliLiter(s) IV Push every 15 minutes  enoxaparin Injectable 40 milliGRAM(s) SubCutaneous every 24 hours  gabapentin 100 milliGRAM(s) Oral every 8 hours  insulin glargine Injectable (LANTUS) 48 Unit(s) SubCutaneous at bedtime  insulin lispro (ADMELOG) corrective regimen sliding scale   SubCutaneous three times a day before meals  insulin lispro (ADMELOG) corrective regimen sliding scale   SubCutaneous at bedtime  insulin lispro Injectable (ADMELOG) 15 Unit(s) SubCutaneous three times a day before meals  insulin regular Infusion 3 Unit(s)/Hr (3 mL/Hr) IV Continuous <Continuous>  levothyroxine 137 MICROGram(s) Oral daily  metoprolol tartrate 25 milliGRAM(s) Oral every 12 hours  pantoprazole    Tablet 40 milliGRAM(s) Oral before breakfast  polyethylene glycol 3350 17 Gram(s) Oral daily  senna 2 Tablet(s) Oral at bedtime  sodium chloride 0.9%. 1000 milliLiter(s) (10 mL/Hr) IV Continuous <Continuous>                                    8.2    22.17 )-----------( 153      ( 22 Apr 2023 00:10 )             24.7       04-22    138  |  104  |  12  ----------------------------<  92  4.2   |  26  |  0.56    Ca    8.7      22 Apr 2023 00:10  Phos  2.3     04-22  Mg     2.4     04-22    TPro  5.6<L>  /  Alb  3.9  /  TBili  0.3  /  DBili  x   /  AST  29  /  ALT  25  /  AlkPhos  51  04-22      PT/INR - ( 21 Apr 2023 01:26 )   PT: 12.5 sec;   INR: 1.09 ratio         PTT - ( 21 Apr 2023 01:26 )  PTT:23.7 sec        Daily     Daily       04-21 @ 07:01  -  04-22 @ 07:00  --------------------------------------------------------  IN: 739 mL / OUT: 1540 mL / NET: -801 mL    04-22 @ 07:01  -  04-22 @ 09:12  --------------------------------------------------------  IN: 75.5 mL / OUT: 0 mL / NET: 75.5 mL        Critically Ill patient  : [ ] preoperative ,   [x] post operative    Requires :  [ ] Arterial Line   [ ] Central Line  [ ] PA catheter  [ ] IABP  [ ] ECMO  [ ] LVAD  [ ] Ventilator  [x] pacemaker- TPM [ ] Impella.                      [x ] ABG's     [ x] Pulse Oxymetry Monitoring  Bedside evaluation , monitoring , treatment of hemodynamics , fluids , IVP/ IVCD meds.        Diagnosis:     POD 2 C3L    Hypovolemia    Hypotension a/w Hypertension, requiring intravenous medication.     Chest tube drainage / management     Requires chest PT, pulmonary toilet, suctioning to maintain SaO2,  patent airway and treat atelectasis.     Extubated 4/20    Temporary pacemaker (TPM) interrogation and setting. Requires  [ ]DDD  [x]VVI    [x]AII  temporary pacing at 45 to maintain HR, MAP, CI, and perfusion.     Hemodynamic lability,  instability. Requires IVCD [x] vasopressors- weaned off [ ] inotropes  [ x] vasodilator  [x]IVSS fluid  to maintain MAP, perfusion, C.I.     IVCD Insulin    Requires bedside physical therapy, mobilization and total long term care.     Obesity OHS / FRANCOISE                  By signing my name below, I, Lizeth Turner, attest that this documentation has been prepared under the direction and in the presence of Dwayne Urbina MD.   Electronically Signed: Nazario Wang 04-22-23 @ 09:12    I, Dwayne Urbina, personally performed the services described in this documentation. All medical record entries made by the marcelinaibbobby were at my direction and in my presence. I have reviewed the chart and agree that the record reflects my personal performance and is accurate and complete.   Dwayne Urbina MD.       Discussed with CT surgeon, Physician Assistant - Nurse Practitioner- Critical care medicine team.   Discussed at  AM / PM rounds.   Chart, labs , films reviewed.    Cumulative Critical Care Time Given Today:  CRITICAL CARE ATTENDING - CTICU    MEDICATIONS  (STANDING):  acetaminophen     Tablet .. 650 milliGRAM(s) Oral every 6 hours  aMIOdarone    Tablet 400 milliGRAM(s) Oral two times a day  ascorbic acid 500 milliGRAM(s) Oral two times a day  aspirin enteric coated 81 milliGRAM(s) Oral daily  atorvastatin 80 milliGRAM(s) Oral at bedtime  bisacodyl Suppository 10 milliGRAM(s) Rectal once  chlorhexidine 2% Cloths 1 Application(s) Topical daily  clopidogrel Tablet 75 milliGRAM(s) Oral daily  dextrose 50% Injectable 50 milliLiter(s) IV Push every 15 minutes  enoxaparin Injectable 40 milliGRAM(s) SubCutaneous every 24 hours  gabapentin 100 milliGRAM(s) Oral every 8 hours  insulin glargine Injectable (LANTUS) 48 Unit(s) SubCutaneous at bedtime  insulin lispro (ADMELOG) corrective regimen sliding scale   SubCutaneous three times a day before meals  insulin lispro (ADMELOG) corrective regimen sliding scale   SubCutaneous at bedtime  insulin lispro Injectable (ADMELOG) 15 Unit(s) SubCutaneous three times a day before meals  insulin regular Infusion 3 Unit(s)/Hr (3 mL/Hr) IV Continuous <Continuous>  levothyroxine 137 MICROGram(s) Oral daily  metoprolol tartrate 25 milliGRAM(s) Oral every 12 hours  pantoprazole    Tablet 40 milliGRAM(s) Oral before breakfast  polyethylene glycol 3350 17 Gram(s) Oral daily  senna 2 Tablet(s) Oral at bedtime  sodium chloride 0.9%. 1000 milliLiter(s) (10 mL/Hr) IV Continuous <Continuous>                                    8.2    22.17 )-----------( 153      ( 22 Apr 2023 00:10 )             24.7       04-22    138  |  104  |  12  ----------------------------<  92  4.2   |  26  |  0.56    Ca    8.7      22 Apr 2023 00:10  Phos  2.3     04-22  Mg     2.4     04-22    TPro  5.6<L>  /  Alb  3.9  /  TBili  0.3  /  DBili  x   /  AST  29  /  ALT  25  /  AlkPhos  51  04-22      PT/INR - ( 21 Apr 2023 01:26 )   PT: 12.5 sec;   INR: 1.09 ratio         PTT - ( 21 Apr 2023 01:26 )  PTT:23.7 sec        Daily     Daily       04-21 @ 07:01  -  04-22 @ 07:00  --------------------------------------------------------  IN: 739 mL / OUT: 1540 mL / NET: -801 mL    04-22 @ 07:01  -  04-22 @ 09:12  --------------------------------------------------------  IN: 75.5 mL / OUT: 0 mL / NET: 75.5 mL        Critically Ill patient  : [ ] preoperative ,   [x] post operative    Requires :  [ ] Arterial Line   [ ] Central Line  [ ] PA catheter  [ ] IABP  [ ] ECMO  [ ] LVAD  [ ] Ventilator  [x] pacemaker- TPM [ ] Impella.                      [x ] ABG's     [ x] Pulse Oxymetry Monitoring  Bedside evaluation , monitoring , treatment of hemodynamics , fluids , IVP/ IVCD meds.        Diagnosis:     POD 2 C3L    Hypovolemia    Hypotension a/w Hypertension, requiring intravenous medication.     Chest tube drainage / management     Requires chest PT, pulmonary toilet, suctioning to maintain SaO2,  patent airway and treat atelectasis.     Extubated 4/20    Temporary pacemaker (TPM) interrogation and setting.     Hemodynamic lability,  instability. Requires IVCD [x] vasopressors- weaned off [ ] inotropes  [ x] vasodilator  [x]IVSS fluid  to maintain MAP, perfusion, C.I.     DM 1     IVCD Insulin - converted to lantus / Admelog    Requires bedside physical therapy, mobilization and total group home care.     Obesity OHS / FRANCOISE                  By signing my name below, I, Lizeth Turner, attest that this documentation has been prepared under the direction and in the presence of Dwayne Urbina MD.   Electronically Signed: Nazario Wang 04-22-23 @ 09:12    Dwayne CAIN, personally performed the services described in this documentation. All medical record entries made by the scribe were at my direction and in my presence. I have reviewed the chart and agree that the record reflects my personal performance and is accurate and complete.   Dwayne Urbina MD.       Discussed with CT surgeon, Physician Assistant - Nurse Practitioner- Critical care medicine team.   Discussed at  AM / PM rounds.   Chart, labs , films reviewed.    Cumulative Critical Care Time Given Today:  30 min

## 2023-04-23 LAB
ALBUMIN SERPL ELPH-MCNC: 3.8 G/DL — SIGNIFICANT CHANGE UP (ref 3.3–5)
ALP SERPL-CCNC: 63 U/L — SIGNIFICANT CHANGE UP (ref 40–120)
ALT FLD-CCNC: 23 U/L — SIGNIFICANT CHANGE UP (ref 10–45)
ANION GAP SERPL CALC-SCNC: 9 MMOL/L — SIGNIFICANT CHANGE UP (ref 5–17)
AST SERPL-CCNC: 21 U/L — SIGNIFICANT CHANGE UP (ref 10–40)
BILIRUB SERPL-MCNC: 0.3 MG/DL — SIGNIFICANT CHANGE UP (ref 0.2–1.2)
BUN SERPL-MCNC: 18 MG/DL — SIGNIFICANT CHANGE UP (ref 7–23)
CALCIUM SERPL-MCNC: 8.8 MG/DL — SIGNIFICANT CHANGE UP (ref 8.4–10.5)
CHLORIDE SERPL-SCNC: 103 MMOL/L — SIGNIFICANT CHANGE UP (ref 96–108)
CO2 SERPL-SCNC: 26 MMOL/L — SIGNIFICANT CHANGE UP (ref 22–31)
CREAT SERPL-MCNC: 0.55 MG/DL — SIGNIFICANT CHANGE UP (ref 0.5–1.3)
EGFR: 109 ML/MIN/1.73M2 — SIGNIFICANT CHANGE UP
GLUCOSE BLDC GLUCOMTR-MCNC: 131 MG/DL — HIGH (ref 70–99)
GLUCOSE BLDC GLUCOMTR-MCNC: 205 MG/DL — HIGH (ref 70–99)
GLUCOSE BLDC GLUCOMTR-MCNC: 224 MG/DL — HIGH (ref 70–99)
GLUCOSE SERPL-MCNC: 146 MG/DL — HIGH (ref 70–99)
MAGNESIUM SERPL-MCNC: 2 MG/DL — SIGNIFICANT CHANGE UP (ref 1.6–2.6)
PHOSPHATE SERPL-MCNC: 2.2 MG/DL — LOW (ref 2.5–4.5)
POTASSIUM SERPL-MCNC: 4.3 MMOL/L — SIGNIFICANT CHANGE UP (ref 3.5–5.3)
POTASSIUM SERPL-SCNC: 4.3 MMOL/L — SIGNIFICANT CHANGE UP (ref 3.5–5.3)
PROT SERPL-MCNC: 5.8 G/DL — LOW (ref 6–8.3)
SODIUM SERPL-SCNC: 138 MMOL/L — SIGNIFICANT CHANGE UP (ref 135–145)

## 2023-04-23 PROCEDURE — 71045 X-RAY EXAM CHEST 1 VIEW: CPT | Mod: 26

## 2023-04-23 PROCEDURE — 71045 X-RAY EXAM CHEST 1 VIEW: CPT | Mod: 26,77

## 2023-04-23 RX ORDER — INSULIN GLARGINE 100 [IU]/ML
50 INJECTION, SOLUTION SUBCUTANEOUS AT BEDTIME
Refills: 0 | Status: DISCONTINUED | OUTPATIENT
Start: 2023-04-23 | End: 2023-04-24

## 2023-04-23 RX ORDER — ONDANSETRON 8 MG/1
4 TABLET, FILM COATED ORAL ONCE
Refills: 0 | Status: COMPLETED | OUTPATIENT
Start: 2023-04-23 | End: 2023-04-23

## 2023-04-23 RX ADMIN — ATORVASTATIN CALCIUM 80 MILLIGRAM(S): 80 TABLET, FILM COATED ORAL at 21:18

## 2023-04-23 RX ADMIN — OXYCODONE HYDROCHLORIDE 10 MILLIGRAM(S): 5 TABLET ORAL at 11:25

## 2023-04-23 RX ADMIN — INSULIN GLARGINE 50 UNIT(S): 100 INJECTION, SOLUTION SUBCUTANEOUS at 22:27

## 2023-04-23 RX ADMIN — Medication 650 MILLIGRAM(S): at 12:41

## 2023-04-23 RX ADMIN — OXYCODONE HYDROCHLORIDE 5 MILLIGRAM(S): 5 TABLET ORAL at 22:23

## 2023-04-23 RX ADMIN — ONDANSETRON 4 MILLIGRAM(S): 8 TABLET, FILM COATED ORAL at 17:44

## 2023-04-23 RX ADMIN — Medication 650 MILLIGRAM(S): at 11:48

## 2023-04-23 RX ADMIN — ENOXAPARIN SODIUM 40 MILLIGRAM(S): 100 INJECTION SUBCUTANEOUS at 14:03

## 2023-04-23 RX ADMIN — Medication 20 MILLIGRAM(S): at 05:54

## 2023-04-23 RX ADMIN — Medication 26 UNIT(S): at 10:01

## 2023-04-23 RX ADMIN — SPIRONOLACTONE 25 MILLIGRAM(S): 25 TABLET, FILM COATED ORAL at 05:55

## 2023-04-23 RX ADMIN — OXYCODONE HYDROCHLORIDE 10 MILLIGRAM(S): 5 TABLET ORAL at 10:25

## 2023-04-23 RX ADMIN — OXYCODONE HYDROCHLORIDE 5 MILLIGRAM(S): 5 TABLET ORAL at 21:23

## 2023-04-23 RX ADMIN — Medication 500 MILLIGRAM(S): at 18:21

## 2023-04-23 RX ADMIN — GABAPENTIN 100 MILLIGRAM(S): 400 CAPSULE ORAL at 14:03

## 2023-04-23 RX ADMIN — GABAPENTIN 100 MILLIGRAM(S): 400 CAPSULE ORAL at 21:17

## 2023-04-23 RX ADMIN — Medication 2: at 10:00

## 2023-04-23 RX ADMIN — PANTOPRAZOLE SODIUM 40 MILLIGRAM(S): 20 TABLET, DELAYED RELEASE ORAL at 05:54

## 2023-04-23 RX ADMIN — Medication 81 MILLIGRAM(S): at 11:49

## 2023-04-23 RX ADMIN — AMIODARONE HYDROCHLORIDE 400 MILLIGRAM(S): 400 TABLET ORAL at 05:54

## 2023-04-23 RX ADMIN — Medication 25 MILLIGRAM(S): at 18:21

## 2023-04-23 RX ADMIN — Medication 500 MILLIGRAM(S): at 05:54

## 2023-04-23 RX ADMIN — Medication 137 MICROGRAM(S): at 05:55

## 2023-04-23 RX ADMIN — Medication 25 MILLIGRAM(S): at 05:54

## 2023-04-23 RX ADMIN — CLOPIDOGREL BISULFATE 75 MILLIGRAM(S): 75 TABLET, FILM COATED ORAL at 11:48

## 2023-04-23 RX ADMIN — CHLORHEXIDINE GLUCONATE 1 APPLICATION(S): 213 SOLUTION TOPICAL at 11:39

## 2023-04-23 RX ADMIN — GABAPENTIN 100 MILLIGRAM(S): 400 CAPSULE ORAL at 05:54

## 2023-04-23 RX ADMIN — Medication 650 MILLIGRAM(S): at 05:54

## 2023-04-23 RX ADMIN — Medication 26 UNIT(S): at 12:44

## 2023-04-23 NOTE — PROVIDER CONTACT NOTE (OTHER) - BACKGROUND
Pt requesting labs to be drawn through IJ and IJ with minimal blood return. Pt refusing to be stuck peripherally for labs.

## 2023-04-23 NOTE — PROGRESS NOTE ADULT - SUBJECTIVE AND OBJECTIVE BOX
VITAL SIGNS-Telemetry:  SR 70-80  Vital Signs Last 24 Hrs  T(C): 36.6 (23 @ 02:57), Max: 37.1 (23 @ 14:20)  T(F): 97.9 (23 @ 02:57), Max: 98.7 (23 @ 14:20)  HR: 78 (23 @ 02:57) (61 - 92)  BP: 144/69 (23 @ 02:57) (108/56 - 157/73)  RR: 18 (23 @ 02:57) (1 - 21)  SpO2: 95% (23 @ 02:57) (77% - 98%)          @ 07:01  -   @ 07:00  --------------------------------------------------------  IN: 739 mL / OUT: 1540 mL / NET: -801 mL     @ 07:01  -   @ 06:59  --------------------------------------------------------  IN: 718 mL / OUT: 3900 mL / NET: -3182 mL    Daily     Daily Weight in k.1 (2023 06:00)    CAPILLARY BLOOD GLUCOSE  221 (2023 13:00)  208 (2023 12:00)  211 (2023 11:00)  241 (2023 10:00)  216 (2023 09:00)    Drains:     MS         [ x ] Drainage: 40/100cc total              Pacing Wires        [  ]   Settings:                                  Isolated  [ x ]    PHYSICAL EXAM:  Neurology: alert and oriented x 3, nonfocal, no gross deficits  CV : S1S2  Sternal Wound :  CDI , Stable  Lungs: cta  Abdomen: soft, nontender, nondistended, positive bowel sounds, last bowel movement         Extremities:     no c/c/e    acetaminophen     Tablet .. 650 milliGRAM(s) Oral every 6 hours  acetaminophen     Tablet .. 650 milliGRAM(s) Oral every 6 hours PRN  ascorbic acid 500 milliGRAM(s) Oral two times a day  aspirin enteric coated 81 milliGRAM(s) Oral daily  atorvastatin 80 milliGRAM(s) Oral at bedtime  bisacodyl Suppository 10 milliGRAM(s) Rectal once  chlorhexidine 2% Cloths 1 Application(s) Topical daily  clopidogrel Tablet 75 milliGRAM(s) Oral daily  dextrose 5%. 1000 milliLiter(s) IV Continuous <Continuous>  dextrose 50% Injectable 50 milliLiter(s) IV Push every 15 minutes  enoxaparin Injectable 40 milliGRAM(s) SubCutaneous every 24 hours  furosemide    Tablet 20 milliGRAM(s) Oral daily  gabapentin 100 milliGRAM(s) Oral every 8 hours  glucagon  Injectable 1 milliGRAM(s) IntraMuscular once  insulin glargine Injectable (LANTUS) 45 Unit(s) SubCutaneous at bedtime  insulin lispro (ADMELOG) corrective regimen sliding scale   SubCutaneous at bedtime  insulin lispro (ADMELOG) corrective regimen sliding scale   SubCutaneous three times a day before meals  insulin lispro Injectable (ADMELOG) 26 Unit(s) SubCutaneous three times a day before meals  levothyroxine 137 MICROGram(s) Oral daily  metoprolol tartrate 25 milliGRAM(s) Oral every 12 hours  oxyCODONE    IR 5 milliGRAM(s) Oral every 4 hours PRN  oxyCODONE    IR 10 milliGRAM(s) Oral every 4 hours PRN  pantoprazole    Tablet 40 milliGRAM(s) Oral before breakfast  polyethylene glycol 3350 17 Gram(s) Oral daily  senna 2 Tablet(s) Oral at bedtime  sodium chloride 0.9%. 1000 milliLiter(s) IV Continuous <Continuous>  spironolactone 25 milliGRAM(s) Oral daily      Physical Therapy Rec:   Home  [ x ]   Home w/ PT  [  ]  Rehab  [  ]  Discussed with Cardiothoracic Team at AM rounds.

## 2023-04-23 NOTE — PROGRESS NOTE ADULT - SUBJECTIVE AND OBJECTIVE BOX
Chief complaint    Patient is a 53y old  Female who presents with a chief complaint of sob (23 Apr 2023 06:58)   Review of systems  Patient appears comfortable.    Labs and Fingersticks  CAPILLARY BLOOD GLUCOSE  205 (23 Apr 2023 08:00)  221 (22 Apr 2023 13:00)  208 (22 Apr 2023 12:00)  211 (22 Apr 2023 11:00)      POCT Blood Glucose.: 205 mg/dL (23 Apr 2023 08:04)  POCT Blood Glucose.: 224 mg/dL (23 Apr 2023 07:59)  POCT Blood Glucose.: 131 mg/dL (23 Apr 2023 01:38)  POCT Blood Glucose.: 167 mg/dL (22 Apr 2023 22:18)  POCT Blood Glucose.: 203 mg/dL (22 Apr 2023 18:16)  POCT Blood Glucose.: 221 mg/dL (22 Apr 2023 13:17)  POCT Blood Glucose.: 208 mg/dL (22 Apr 2023 11:39)  POCT Blood Glucose.: 211 mg/dL (22 Apr 2023 10:48)      Anion Gap, Serum: 9 (04-23 @ 07:01)  Anion Gap, Serum: 8 (04-22 @ 00:10)      Calcium, Total Serum: 8.8 (04-23 @ 07:01)  Calcium, Total Serum: 8.7 (04-22 @ 00:10)  Albumin, Serum: 3.8 (04-23 @ 07:01)  Albumin, Serum: 3.9 (04-22 @ 00:10)    Alanine Aminotransferase (ALT/SGPT): 23 (04-23 @ 07:01)  Alanine Aminotransferase (ALT/SGPT): 25 (04-22 @ 00:10)  Alkaline Phosphatase, Serum: 63 (04-23 @ 07:01)  Alkaline Phosphatase, Serum: 51 (04-22 @ 00:10)  Aspartate Aminotransferase (AST/SGOT): 21 (04-23 @ 07:01)  Aspartate Aminotransferase (AST/SGOT): 29 (04-22 @ 00:10)        04-23    138  |  103  |  18  ----------------------------<  146<H>  4.3   |  26  |  0.55    Ca    8.8      23 Apr 2023 07:01  Phos  2.2     04-23  Mg     2.0     04-23    TPro  5.8<L>  /  Alb  3.8  /  TBili  0.3  /  DBili  x   /  AST  21  /  ALT  23  /  AlkPhos  63  04-23                        8.2    22.17 )-----------( 153      ( 22 Apr 2023 00:10 )             24.7     Medications  MEDICATIONS  (STANDING):  acetaminophen     Tablet .. 650 milliGRAM(s) Oral every 6 hours  ascorbic acid 500 milliGRAM(s) Oral two times a day  aspirin enteric coated 81 milliGRAM(s) Oral daily  atorvastatin 80 milliGRAM(s) Oral at bedtime  bisacodyl Suppository 10 milliGRAM(s) Rectal once  chlorhexidine 2% Cloths 1 Application(s) Topical daily  clopidogrel Tablet 75 milliGRAM(s) Oral daily  dextrose 5%. 1000 milliLiter(s) (100 mL/Hr) IV Continuous <Continuous>  dextrose 50% Injectable 50 milliLiter(s) IV Push every 15 minutes  enoxaparin Injectable 40 milliGRAM(s) SubCutaneous every 24 hours  furosemide    Tablet 20 milliGRAM(s) Oral daily  gabapentin 100 milliGRAM(s) Oral every 8 hours  glucagon  Injectable 1 milliGRAM(s) IntraMuscular once  insulin glargine Injectable (LANTUS) 50 Unit(s) SubCutaneous at bedtime  insulin lispro (ADMELOG) corrective regimen sliding scale   SubCutaneous at bedtime  insulin lispro (ADMELOG) corrective regimen sliding scale   SubCutaneous three times a day before meals  insulin lispro Injectable (ADMELOG) 26 Unit(s) SubCutaneous three times a day before meals  levothyroxine 137 MICROGram(s) Oral daily  metoprolol tartrate 25 milliGRAM(s) Oral every 12 hours  pantoprazole    Tablet 40 milliGRAM(s) Oral before breakfast  polyethylene glycol 3350 17 Gram(s) Oral daily  senna 2 Tablet(s) Oral at bedtime  sodium chloride 0.9%. 1000 milliLiter(s) (10 mL/Hr) IV Continuous <Continuous>  spironolactone 25 milliGRAM(s) Oral daily      Physical Exam  General: Patient appears comfortable.  Vital Signs Last 12 Hrs  T(F): 98 (04-23-23 @ 06:52), Max: 98.2 (04-22-23 @ 23:03)  HR: 79 (04-23-23 @ 06:52) (78 - 79)  BP: 144/65 (04-23-23 @ 06:52) (126/59 - 144/69)  BP(mean): 93 (04-23-23 @ 06:52) (85 - 99)  RR: 18 (04-23-23 @ 06:52) (18 - 18)  SpO2: 97% (04-23-23 @ 06:52) (95% - 98%)  Neck: No palpable thyroid nodules.  CVS: S1S2, No murmurs  Respiratory: No wheezing, no crepitations  GI: Abdomen soft, non tender.    Diagnostics        Radiology:

## 2023-04-24 ENCOUNTER — TRANSCRIPTION ENCOUNTER (OUTPATIENT)
Age: 53
End: 2023-04-24

## 2023-04-24 VITALS — DIASTOLIC BLOOD PRESSURE: 55 MMHG | TEMPERATURE: 99 F | SYSTOLIC BLOOD PRESSURE: 110 MMHG | HEART RATE: 95 BPM

## 2023-04-24 PROBLEM — U07.1 COVID-19: Chronic | Status: ACTIVE | Noted: 2023-04-17

## 2023-04-24 LAB
ALBUMIN SERPL ELPH-MCNC: 3.8 G/DL — SIGNIFICANT CHANGE UP (ref 3.3–5)
ALP SERPL-CCNC: 70 U/L — SIGNIFICANT CHANGE UP (ref 40–120)
ALT FLD-CCNC: 26 U/L — SIGNIFICANT CHANGE UP (ref 10–45)
ANION GAP SERPL CALC-SCNC: 10 MMOL/L — SIGNIFICANT CHANGE UP (ref 5–17)
AST SERPL-CCNC: 17 U/L — SIGNIFICANT CHANGE UP (ref 10–40)
BASOPHILS # BLD AUTO: 0.03 K/UL — SIGNIFICANT CHANGE UP (ref 0–0.2)
BASOPHILS NFR BLD AUTO: 0.2 % — SIGNIFICANT CHANGE UP (ref 0–2)
BILIRUB SERPL-MCNC: 0.4 MG/DL — SIGNIFICANT CHANGE UP (ref 0.2–1.2)
BUN SERPL-MCNC: 20 MG/DL — SIGNIFICANT CHANGE UP (ref 7–23)
CALCIUM SERPL-MCNC: 8.9 MG/DL — SIGNIFICANT CHANGE UP (ref 8.4–10.5)
CHLORIDE SERPL-SCNC: 99 MMOL/L — SIGNIFICANT CHANGE UP (ref 96–108)
CO2 SERPL-SCNC: 26 MMOL/L — SIGNIFICANT CHANGE UP (ref 22–31)
CREAT SERPL-MCNC: 0.66 MG/DL — SIGNIFICANT CHANGE UP (ref 0.5–1.3)
EGFR: 104 ML/MIN/1.73M2 — SIGNIFICANT CHANGE UP
EOSINOPHIL # BLD AUTO: 0.07 K/UL — SIGNIFICANT CHANGE UP (ref 0–0.5)
EOSINOPHIL NFR BLD AUTO: 0.5 % — SIGNIFICANT CHANGE UP (ref 0–6)
GLUCOSE SERPL-MCNC: 199 MG/DL — HIGH (ref 70–99)
HCT VFR BLD CALC: 30.4 % — LOW (ref 34.5–45)
HGB BLD-MCNC: 9.9 G/DL — LOW (ref 11.5–15.5)
IMM GRANULOCYTES NFR BLD AUTO: 1.8 % — HIGH (ref 0–0.9)
LYMPHOCYTES # BLD AUTO: 2.9 K/UL — SIGNIFICANT CHANGE UP (ref 1–3.3)
LYMPHOCYTES # BLD AUTO: 21.4 % — SIGNIFICANT CHANGE UP (ref 13–44)
MAGNESIUM SERPL-MCNC: 1.7 MG/DL — SIGNIFICANT CHANGE UP (ref 1.6–2.6)
MCHC RBC-ENTMCNC: 32.2 PG — SIGNIFICANT CHANGE UP (ref 27–34)
MCHC RBC-ENTMCNC: 32.6 GM/DL — SIGNIFICANT CHANGE UP (ref 32–36)
MCV RBC AUTO: 99 FL — SIGNIFICANT CHANGE UP (ref 80–100)
MONOCYTES # BLD AUTO: 1.12 K/UL — HIGH (ref 0–0.9)
MONOCYTES NFR BLD AUTO: 8.3 % — SIGNIFICANT CHANGE UP (ref 2–14)
NEUTROPHILS # BLD AUTO: 9.17 K/UL — HIGH (ref 1.8–7.4)
NEUTROPHILS NFR BLD AUTO: 67.8 % — SIGNIFICANT CHANGE UP (ref 43–77)
NRBC # BLD: 0 /100 WBCS — SIGNIFICANT CHANGE UP (ref 0–0)
PHOSPHATE SERPL-MCNC: 2 MG/DL — LOW (ref 2.5–4.5)
PLATELET # BLD AUTO: 223 K/UL — SIGNIFICANT CHANGE UP (ref 150–400)
POTASSIUM SERPL-MCNC: 3.7 MMOL/L — SIGNIFICANT CHANGE UP (ref 3.5–5.3)
POTASSIUM SERPL-SCNC: 3.7 MMOL/L — SIGNIFICANT CHANGE UP (ref 3.5–5.3)
PROT SERPL-MCNC: 6.1 G/DL — SIGNIFICANT CHANGE UP (ref 6–8.3)
RBC # BLD: 3.07 M/UL — LOW (ref 3.8–5.2)
RBC # FLD: 13.5 % — SIGNIFICANT CHANGE UP (ref 10.3–14.5)
SODIUM SERPL-SCNC: 135 MMOL/L — SIGNIFICANT CHANGE UP (ref 135–145)
WBC # BLD: 13.53 K/UL — HIGH (ref 3.8–10.5)
WBC # FLD AUTO: 13.53 K/UL — HIGH (ref 3.8–10.5)

## 2023-04-24 PROCEDURE — 86850 RBC ANTIBODY SCREEN: CPT

## 2023-04-24 PROCEDURE — 97162 PT EVAL MOD COMPLEX 30 MIN: CPT

## 2023-04-24 PROCEDURE — 71045 X-RAY EXAM CHEST 1 VIEW: CPT

## 2023-04-24 PROCEDURE — 84132 ASSAY OF SERUM POTASSIUM: CPT

## 2023-04-24 PROCEDURE — 84100 ASSAY OF PHOSPHORUS: CPT

## 2023-04-24 PROCEDURE — C1769: CPT

## 2023-04-24 PROCEDURE — 86900 BLOOD TYPING SEROLOGIC ABO: CPT

## 2023-04-24 PROCEDURE — 84439 ASSAY OF FREE THYROXINE: CPT

## 2023-04-24 PROCEDURE — 82962 GLUCOSE BLOOD TEST: CPT

## 2023-04-24 PROCEDURE — 83036 HEMOGLOBIN GLYCOSYLATED A1C: CPT

## 2023-04-24 PROCEDURE — 81025 URINE PREGNANCY TEST: CPT

## 2023-04-24 PROCEDURE — 97112 NEUROMUSCULAR REEDUCATION: CPT

## 2023-04-24 PROCEDURE — 84443 ASSAY THYROID STIM HORMONE: CPT

## 2023-04-24 PROCEDURE — 36415 COLL VENOUS BLD VENIPUNCTURE: CPT

## 2023-04-24 PROCEDURE — 85520 HEPARIN ASSAY: CPT

## 2023-04-24 PROCEDURE — 93005 ELECTROCARDIOGRAM TRACING: CPT

## 2023-04-24 PROCEDURE — 83605 ASSAY OF LACTIC ACID: CPT

## 2023-04-24 PROCEDURE — 85014 HEMATOCRIT: CPT

## 2023-04-24 PROCEDURE — 82330 ASSAY OF CALCIUM: CPT

## 2023-04-24 PROCEDURE — P9045: CPT

## 2023-04-24 PROCEDURE — C1729: CPT

## 2023-04-24 PROCEDURE — 82435 ASSAY OF BLOOD CHLORIDE: CPT

## 2023-04-24 PROCEDURE — C9399: CPT

## 2023-04-24 PROCEDURE — 84436 ASSAY OF TOTAL THYROXINE: CPT

## 2023-04-24 PROCEDURE — 85610 PROTHROMBIN TIME: CPT

## 2023-04-24 PROCEDURE — 97116 GAIT TRAINING THERAPY: CPT

## 2023-04-24 PROCEDURE — 82550 ASSAY OF CK (CPK): CPT

## 2023-04-24 PROCEDURE — 85384 FIBRINOGEN ACTIVITY: CPT

## 2023-04-24 PROCEDURE — C1894: CPT

## 2023-04-24 PROCEDURE — 97165 OT EVAL LOW COMPLEX 30 MIN: CPT

## 2023-04-24 PROCEDURE — 84295 ASSAY OF SERUM SODIUM: CPT

## 2023-04-24 PROCEDURE — 82947 ASSAY GLUCOSE BLOOD QUANT: CPT

## 2023-04-24 PROCEDURE — 31720 CLEARANCE OF AIRWAYS: CPT

## 2023-04-24 PROCEDURE — C1751: CPT

## 2023-04-24 PROCEDURE — 85025 COMPLETE CBC W/AUTO DIFF WBC: CPT

## 2023-04-24 PROCEDURE — 84480 ASSAY TRIIODOTHYRONINE (T3): CPT

## 2023-04-24 PROCEDURE — 94002 VENT MGMT INPAT INIT DAY: CPT

## 2023-04-24 PROCEDURE — 80053 COMPREHEN METABOLIC PANEL: CPT

## 2023-04-24 PROCEDURE — 82565 ASSAY OF CREATININE: CPT

## 2023-04-24 PROCEDURE — 83735 ASSAY OF MAGNESIUM: CPT

## 2023-04-24 PROCEDURE — 85730 THROMBOPLASTIN TIME PARTIAL: CPT

## 2023-04-24 PROCEDURE — 86901 BLOOD TYPING SEROLOGIC RH(D): CPT

## 2023-04-24 PROCEDURE — 82803 BLOOD GASES ANY COMBINATION: CPT

## 2023-04-24 PROCEDURE — 85018 HEMOGLOBIN: CPT

## 2023-04-24 PROCEDURE — C1889: CPT

## 2023-04-24 PROCEDURE — 86891 AUTOLOGOUS BLOOD OP SALVAGE: CPT

## 2023-04-24 PROCEDURE — 84484 ASSAY OF TROPONIN QUANT: CPT

## 2023-04-24 PROCEDURE — 82553 CREATINE MB FRACTION: CPT

## 2023-04-24 RX ORDER — METOPROLOL TARTRATE 50 MG
50 TABLET ORAL DAILY
Refills: 0 | Status: DISCONTINUED | OUTPATIENT
Start: 2023-04-24 | End: 2023-04-24

## 2023-04-24 RX ORDER — SENNA PLUS 8.6 MG/1
2 TABLET ORAL
Qty: 60 | Refills: 0
Start: 2023-04-24 | End: 2023-05-23

## 2023-04-24 RX ORDER — PANTOPRAZOLE SODIUM 20 MG/1
1 TABLET, DELAYED RELEASE ORAL
Qty: 30 | Refills: 0
Start: 2023-04-24 | End: 2023-05-23

## 2023-04-24 RX ORDER — CLOPIDOGREL BISULFATE 75 MG/1
1 TABLET, FILM COATED ORAL
Qty: 30 | Refills: 0
Start: 2023-04-24 | End: 2023-05-23

## 2023-04-24 RX ORDER — INSULIN LISPRO 100/ML
20 VIAL (ML) SUBCUTANEOUS
Qty: 0 | Refills: 0 | DISCHARGE

## 2023-04-24 RX ORDER — GABAPENTIN 400 MG/1
1 CAPSULE ORAL
Qty: 9 | Refills: 0
Start: 2023-04-24 | End: 2023-04-26

## 2023-04-24 RX ORDER — INSULIN LISPRO 100/ML
20 VIAL (ML) SUBCUTANEOUS
Refills: 0 | Status: DISCONTINUED | OUTPATIENT
Start: 2023-04-24 | End: 2023-04-24

## 2023-04-24 RX ORDER — INSULIN GLARGINE 100 [IU]/ML
60 INJECTION, SOLUTION SUBCUTANEOUS AT BEDTIME
Refills: 0 | Status: DISCONTINUED | OUTPATIENT
Start: 2023-04-24 | End: 2023-04-24

## 2023-04-24 RX ORDER — FUROSEMIDE 40 MG
1 TABLET ORAL
Qty: 3 | Refills: 0
Start: 2023-04-24 | End: 2023-04-26

## 2023-04-24 RX ORDER — ASPIRIN/CALCIUM CARB/MAGNESIUM 324 MG
1 TABLET ORAL
Refills: 0 | DISCHARGE

## 2023-04-24 RX ORDER — SORBITOL SOLUTION 70 %
15 SOLUTION, ORAL MISCELLANEOUS ONCE
Refills: 0 | Status: COMPLETED | OUTPATIENT
Start: 2023-04-24 | End: 2023-04-24

## 2023-04-24 RX ORDER — AMLODIPINE BESYLATE 2.5 MG/1
1 TABLET ORAL
Refills: 0 | DISCHARGE

## 2023-04-24 RX ORDER — METOPROLOL TARTRATE 50 MG
1 TABLET ORAL
Refills: 0 | DISCHARGE

## 2023-04-24 RX ORDER — ASPIRIN/CALCIUM CARB/MAGNESIUM 324 MG
1 TABLET ORAL
Qty: 30 | Refills: 0
Start: 2023-04-24 | End: 2023-05-23

## 2023-04-24 RX ORDER — ATORVASTATIN CALCIUM 80 MG/1
1 TABLET, FILM COATED ORAL
Qty: 30 | Refills: 0
Start: 2023-04-24 | End: 2023-05-23

## 2023-04-24 RX ORDER — LEVOTHYROXINE SODIUM 125 MCG
1 TABLET ORAL
Qty: 0 | Refills: 0 | DISCHARGE

## 2023-04-24 RX ORDER — MAGNESIUM SULFATE 500 MG/ML
2 VIAL (ML) INJECTION ONCE
Refills: 0 | Status: COMPLETED | OUTPATIENT
Start: 2023-04-24 | End: 2023-04-24

## 2023-04-24 RX ORDER — FAMOTIDINE 10 MG/ML
1 INJECTION INTRAVENOUS
Refills: 0 | DISCHARGE

## 2023-04-24 RX ORDER — POTASSIUM CHLORIDE 20 MEQ
20 PACKET (EA) ORAL
Refills: 0 | Status: COMPLETED | OUTPATIENT
Start: 2023-04-24 | End: 2023-04-24

## 2023-04-24 RX ORDER — OXYCODONE HYDROCHLORIDE 5 MG/1
1 TABLET ORAL
Qty: 20 | Refills: 0
Start: 2023-04-24 | End: 2023-04-28

## 2023-04-24 RX ORDER — CALCIUM CARBONATE 500(1250)
1 TABLET ORAL
Refills: 0 | DISCHARGE

## 2023-04-24 RX ORDER — SPIRONOLACTONE 25 MG/1
25 TABLET, FILM COATED ORAL DAILY
Refills: 0 | Status: DISCONTINUED | OUTPATIENT
Start: 2023-04-24 | End: 2023-04-24

## 2023-04-24 RX ORDER — CLOPIDOGREL BISULFATE 75 MG/1
1 TABLET, FILM COATED ORAL
Refills: 0 | DISCHARGE

## 2023-04-24 RX ORDER — INSULIN GLARGINE 100 [IU]/ML
12 INJECTION, SOLUTION SUBCUTANEOUS
Refills: 0 | DISCHARGE

## 2023-04-24 RX ORDER — INSULIN GLARGINE 100 [IU]/ML
60 INJECTION, SOLUTION SUBCUTANEOUS
Qty: 0 | Refills: 0 | DISCHARGE

## 2023-04-24 RX ORDER — FUROSEMIDE 40 MG
20 TABLET ORAL DAILY
Refills: 0 | Status: DISCONTINUED | OUTPATIENT
Start: 2023-04-24 | End: 2023-04-24

## 2023-04-24 RX ORDER — LEVOTHYROXINE SODIUM 125 MCG
1 TABLET ORAL
Qty: 30 | Refills: 0
Start: 2023-04-24 | End: 2023-05-23

## 2023-04-24 RX ORDER — METOPROLOL TARTRATE 50 MG
1 TABLET ORAL
Qty: 30 | Refills: 0
Start: 2023-04-24 | End: 2023-05-23

## 2023-04-24 RX ORDER — ACETAMINOPHEN 500 MG
2 TABLET ORAL
Qty: 40 | Refills: 0
Start: 2023-04-24 | End: 2023-04-28

## 2023-04-24 RX ORDER — ERGOCALCIFEROL 1.25 MG/1
1 CAPSULE ORAL
Qty: 0 | Refills: 0 | DISCHARGE

## 2023-04-24 RX ORDER — ATORVASTATIN CALCIUM 80 MG/1
1 TABLET, FILM COATED ORAL
Refills: 0 | DISCHARGE

## 2023-04-24 RX ORDER — SPIRONOLACTONE 25 MG/1
1 TABLET, FILM COATED ORAL
Qty: 3 | Refills: 0
Start: 2023-04-24 | End: 2023-04-26

## 2023-04-24 RX ADMIN — Medication 81 MILLIGRAM(S): at 11:56

## 2023-04-24 RX ADMIN — Medication 20 MILLIEQUIVALENT(S): at 12:37

## 2023-04-24 RX ADMIN — ENOXAPARIN SODIUM 40 MILLIGRAM(S): 100 INJECTION SUBCUTANEOUS at 13:04

## 2023-04-24 RX ADMIN — Medication 25 GRAM(S): at 14:17

## 2023-04-24 RX ADMIN — Medication 20 UNIT(S): at 12:59

## 2023-04-24 RX ADMIN — Medication 6: at 07:33

## 2023-04-24 RX ADMIN — CLOPIDOGREL BISULFATE 75 MILLIGRAM(S): 75 TABLET, FILM COATED ORAL at 11:56

## 2023-04-24 RX ADMIN — Medication 15 MILLILITER(S): at 10:28

## 2023-04-24 RX ADMIN — Medication 50 MILLIGRAM(S): at 13:03

## 2023-04-24 RX ADMIN — Medication 500 MILLIGRAM(S): at 05:41

## 2023-04-24 RX ADMIN — Medication 20 MILLIEQUIVALENT(S): at 11:55

## 2023-04-24 RX ADMIN — PANTOPRAZOLE SODIUM 40 MILLIGRAM(S): 20 TABLET, DELAYED RELEASE ORAL at 05:41

## 2023-04-24 RX ADMIN — SPIRONOLACTONE 25 MILLIGRAM(S): 25 TABLET, FILM COATED ORAL at 05:41

## 2023-04-24 RX ADMIN — GABAPENTIN 100 MILLIGRAM(S): 400 CAPSULE ORAL at 13:03

## 2023-04-24 RX ADMIN — Medication 25 MILLIGRAM(S): at 05:40

## 2023-04-24 RX ADMIN — Medication 20 MILLIGRAM(S): at 05:41

## 2023-04-24 RX ADMIN — Medication 26 UNIT(S): at 07:33

## 2023-04-24 RX ADMIN — GABAPENTIN 100 MILLIGRAM(S): 400 CAPSULE ORAL at 05:40

## 2023-04-24 RX ADMIN — Medication 137 MICROGRAM(S): at 05:40

## 2023-04-24 NOTE — DISCHARGE NOTE PROVIDER - NSDCFUADDINST_GEN_ALL_CORE_FT
1. Daily Shower  2. Weight yourself daily and notify any weight gain greater than 2-3 pounds in 24 hours.  3. Regular diabetic  diet - low fat, low cholesterol, no added salt.  4. Cleanse Midsternal incision and leg incision daily while showering with warm water and mild soap, pat dry and maintain open to air.   5. Follow Cardiac Surgery Do's and Don'ts discharge instructions.   6. No driving until cleared by MD.   7. No heavy lifting nothing greater than 5 pounds until cleared by MD.   8. Call / Notify MD any fever greater than 101.0  9. Increase Activity as tolerated.     Check your glucose before meals and at bedtime and write it down.  Bring those results to your  endocrinology or primary care doctor appointment .

## 2023-04-24 NOTE — PROGRESS NOTE ADULT - PROBLEM SELECTOR PLAN 3
Will get full thyroid panel, Will continue current Synthroid dose, will FU.  Suggest to repeat thyroid function test in 4-6 weeks. Outpatient follow up.

## 2023-04-24 NOTE — DISCHARGE NOTE PROVIDER - CARE PROVIDERS DIRECT ADDRESSES
[email or web address]

## 2023-04-24 NOTE — PROGRESS NOTE ADULT - ASSESSMENT
Assessment  DMT1: 52y Female with DM T1 with hyperglycemia, A1C 7.2%, was on insulin at home (twice daily basal + bolus insulin), now s/p cardiac surgery. Postop requiring  insulin drip. Extubated and eating meals, being transitioned to basal bolus, sugars are running high, has high insulin requirement, she is does not agree with insulin dose and asks for adjustments,  counseled, insulin dose adjusted.   Will need tight glycemic control for postop wound healing.   CAD: on medications, stable, monitored. s/p CABG  Hypothyroidism: On Synthroid 137mcg po daily, compliant with Synthroid intake, asymptomatic. Full TFT pending, TSH WNL(1.68). Low total t4, low t3. Free thyroxine pending.       Ashwin Basurto MD  Cell: [phone]  Office: [phone]            
52 year old female with hx of IDDM  since age 7, HTN, CAD s/p  cardiac stents. Has been experiencing "heartburn, dizziness with minimal activity evaluation by cardiology referred for cardiac cath.  Results revealed triple vessel disease referred for bypass.     *Pt c/o dizziness , lightheaded at PST , we called Dr Tirado (surgeon) . He spoke with patient. Pt can go home today after PST.  We instructed if she has heartburn, dizziness, lightheaded at home call surgeon office and or go to ER.    4/20 s/p C3L   4/22 Endo consulted - IDDM - insulin gtt stopped - Moved to SDU
52 year old female with hx of IDDM  since age 7, HTN, CAD s/p  cardiac stents. Has been experiencing "heartburn, dizziness with minimal activity evaluation by cardiology referred for cardiac cath.  Results revealed triple vessel disease referred for bypass.     *Pt c/o dizziness , lightheaded at PST , we called Dr Tirado (surgeon) . He spoke with patient. Pt can go home today after PST.  We instructed if she has heartburn, dizziness, lightheaded at home call surgeon office and or go to ER.    4/20 s/p C3L   4/22 Endo consulted - IDDM - insulin gtt stopped - Moved to SDU  4/23 vss glucose well controlled - will d/c med ct today - d/c plan home  4/24  chest tubes out, pw d/c.  Glucose controll .  Endo following  Scant serous drainage from  distal incision, wbc elevated but trending down, afebrile.  The patient wants to go home today.  D/c planning as per Dr Tirado  Transition to [facility]
  Assessment  DMT1: 52y Female with DM T1 with hyperglycemia, A1C 7.2%, was on insulin at home (twice daily basal + bolus insulin), now s/p cardiac surgery. Postop requiring  insulin drip. Extubated and eating meals, being transitioned to basal bolus, sugars are running high, has high insulin requirement.   Will need tight glycemic control for postop wound healing.   CAD: on medications, stable, monitored. s/p CABG  Hypothyroidism: On Synthroid 137mcg po daily, compliant with Synthroid intake, asymptomatic. Full TFT pending, TSH WNL(1.68). Low total t4, low t3. Free thyroxine pending.       Ashwin Basurto MD  Cell: [phone]  Office: [phone]            
52 year old female with hx of IDDM  since age 7, HTN, CAD s/p  cardiac stents. Has been experiencing "heartburn, dizziness with minimal activity evaluation by cardiology referred for cardiac cath.  Results revealed triple vessel disease referred for bypass.     *Pt c/o dizziness , lightheaded at PST , we called Dr Tirado (surgeon) . He spoke with patient. Pt can go home today after PST.  We instructed if she has heartburn, dizziness, lightheaded at home call surgeon office and or go to ER.    4/20 s/p C3L   4/22 Endo consulted - IDDM - insulin gtt stopped - Moved to SDU  4/23 vss glucose well controlled - will d/c med ct today - d/c plan home
  Assessment  DMT1: 52y Female with DM T1 with hyperglycemia, A1C 7.2%, was on insulin at home (twice daily basal + bolus insulin), now s/p cardiac surgery. Postop requiring  insulin drip. Extubated and eating meals, being transitioned to basal bolus, sugars are still running high, has high insulin requirement, she is does not agree with insulin dose and asks for adjustments,  counseled.   Will need tight glycemic control for postop wound healing.   CAD: on medications, stable, monitored. s/p CABG  Hypothyroidism: On Synthroid 137mcg po daily, compliant with Synthroid intake, asymptomatic. Full TFT pending, TSH WNL(1.68). Low total t4, low t3. Free thyroxine pending.       Ashwin Basurto MD  Cell: [phone]  Office: [phone]

## 2023-04-24 NOTE — PROGRESS NOTE ADULT - SUBJECTIVE AND OBJECTIVE BOX
Chief complaint    Patient is a 53y old  Female who presents with a chief complaint of sob (23 Apr 2023 06:58)   Review of systems  Patient appears comfortable.    Labs and Fingersticks  CAPILLARY BLOOD GLUCOSE  229 (24 Apr 2023 07:15)  233 (24 Apr 2023 03:00)  280 (23 Apr 2023 22:00)  108 (23 Apr 2023 18:00)  107 (23 Apr 2023 12:00)  183 (23 Apr 2023 10:00)          Anion Gap, Serum: 9 (04-23 @ 07:01)      Calcium, Total Serum: 8.8 (04-23 @ 07:01)  Albumin, Serum: 3.8 (04-23 @ 07:01)    Alanine Aminotransferase (ALT/SGPT): 23 (04-23 @ 07:01)  Alkaline Phosphatase, Serum: 63 (04-23 @ 07:01)  Aspartate Aminotransferase (AST/SGOT): 21 (04-23 @ 07:01)        04-23    138  |  103  |  18  ----------------------------<  146<H>  4.3   |  26  |  0.55    Ca    8.8      23 Apr 2023 07:01  Phos  2.2     04-23  Mg     2.0     04-23    TPro  5.8<L>  /  Alb  3.8  /  TBili  0.3  /  DBili  x   /  AST  21  /  ALT  23  /  AlkPhos  63  04-23    Medications  MEDICATIONS  (STANDING):  ascorbic acid 500 milliGRAM(s) Oral two times a day  aspirin enteric coated 81 milliGRAM(s) Oral daily  atorvastatin 80 milliGRAM(s) Oral at bedtime  bisacodyl Suppository 10 milliGRAM(s) Rectal once  chlorhexidine 2% Cloths 1 Application(s) Topical daily  clopidogrel Tablet 75 milliGRAM(s) Oral daily  dextrose 5%. 1000 milliLiter(s) (100 mL/Hr) IV Continuous <Continuous>  dextrose 50% Injectable 50 milliLiter(s) IV Push every 15 minutes  enoxaparin Injectable 40 milliGRAM(s) SubCutaneous every 24 hours  gabapentin 100 milliGRAM(s) Oral every 8 hours  glucagon  Injectable 1 milliGRAM(s) IntraMuscular once  insulin glargine Injectable (LANTUS) 60 Unit(s) SubCutaneous at bedtime  insulin lispro (ADMELOG) corrective regimen sliding scale   SubCutaneous at bedtime  insulin lispro (ADMELOG) corrective regimen sliding scale   SubCutaneous three times a day before meals  insulin lispro Injectable (ADMELOG) 26 Unit(s) SubCutaneous three times a day before meals  levothyroxine 137 MICROGram(s) Oral daily  metoprolol tartrate 25 milliGRAM(s) Oral every 12 hours  pantoprazole    Tablet 40 milliGRAM(s) Oral before breakfast  polyethylene glycol 3350 17 Gram(s) Oral daily  senna 2 Tablet(s) Oral at bedtime  sodium chloride 0.9%. 1000 milliLiter(s) (10 mL/Hr) IV Continuous <Continuous>  sorbitol 70% Solution 15 milliLiter(s) Oral once  spironolactone 25 milliGRAM(s) Oral daily      Physical Exam  General: Patient appears comfortable.  Vital Signs Last 12 Hrs  T(F): 97.6 (04-24-23 @ 07:15), Max: 99.2 (04-23-23 @ 23:00)  HR: 86 (04-24-23 @ 07:15) (77 - 86)  BP: 112/60 (04-24-23 @ 07:15) (112/60 - 132/65)  BP(mean): 79 (04-24-23 @ 07:15) (79 - 93)  RR: 18 (04-24-23 @ 07:15) (18 - 18)  SpO2: 95% (04-24-23 @ 07:15) (95% - 97%)  Neck: No palpable thyroid nodules.  CVS: S1S2, No murmurs  Respiratory: No wheezing, no crepitations  GI: Abdomen soft, non tender.    Diagnostics        Radiology:

## 2023-04-24 NOTE — DISCHARGE NOTE PROVIDER - PROVIDER TOKENS
PROVIDER:[TOKEN:[[id number]],SCHEDULEDAPPT:[05/01/2023],SCHEDULEDAPPTTIME:[01:45 PM]],PROVIDER:[TOKEN:[[id number]],FOLLOWUP:[2 weeks]],PROVIDER:[TOKEN:[[id number]],FOLLOWUP:[2 weeks]]

## 2023-04-24 NOTE — OCCUPATIONAL THERAPY INITIAL EVALUATION ADULT - ADDITIONAL COMMENTS
Pt reports that she resides with spouse in , 2 abena, first floor set up, +walk in shower, daughter has a seat for pt to utilize. PTA pt was independent in all adl's and functional mobility without ad/ae

## 2023-04-24 NOTE — OCCUPATIONAL THERAPY INITIAL EVALUATION ADULT - DIAGNOSIS, OT EVAL
Patient presents with decreased balance, strength, endurance impacting ability to perform ADLs and functional mobility
Admission Reconciliation is Completed  Discharge Reconciliation is Completed

## 2023-04-24 NOTE — PROGRESS NOTE ADULT - PROBLEM SELECTOR PROBLEM 2
CAD (coronary artery disease)
Insulin dependent type 1 diabetes mellitus

## 2023-04-24 NOTE — DISCHARGE NOTE PROVIDER - NSDCHHNEEDSERVICE_GEN_ALL_CORE
Medication teaching and assessment/Ostomy care and management/Rehabilitation services/Wound care and assessment

## 2023-04-24 NOTE — DISCHARGE NOTE NURSING/CASE MANAGEMENT/SOCIAL WORK - NSDCPEFALRISK_GEN_ALL_CORE
For information on Fall & Injury Prevention, visit: [email or web address] OR  [email or web address] OR  https://www.cdc.gov/steadi/patient.html

## 2023-04-24 NOTE — DISCHARGE NOTE PROVIDER - NSDCFUADDAPPT_GEN_ALL_CORE_FT
YOU HAVE AN APPOINTMENT WITH DR GONZALEZ ON MONDAY 5/1 AT 1:45 PM    CALL DR BRAUN AND SEE HIM IN 2 WEEKS    CALL DR OSMAN AND SEE HIM IN 2 WEEKS  Check your glucose before meals and at bedtime and write it down.  Bring those results to your  endocrinology or primary care doctor appointment .

## 2023-04-24 NOTE — PROGRESS NOTE ADULT - PROBLEM SELECTOR PLAN 2
On medications,  no chest pain, stable, monitored and followed up by primary cardiothoracic team/cardiology team
Endo following   f/u RECs  Off insulin gtt   Continue with Lantus 60U and Adm premeal 23U TID   Carb consistent diet   Patient was refusing FS   Blood sugar check via RIJ
Endo following   f/u RECs  Off insulin gtt   Continue with Lantus 60U and Adm premeal 23U TID   Carb consistent diet   Patient was refusing FS   Blood sugar check via RIJ
On medications,  no chest pain, stable, monitored and followed up by primary cardiothoracic team/cardiology team
Endo following   f/u RECs  Off insulin gtt   Continue with Lantus 60U and Adm premeal 23U TID   Carb consistent diet   Patient was refusing FS   Blood sugar check via RIJ
On medications,  no chest pain, stable, monitored and followed up by primary cardiothoracic team/cardiology team

## 2023-04-24 NOTE — PROGRESS NOTE ADULT - SUBJECTIVE AND OBJECTIVE BOX
VITAL SIGNS    Telemetry:      Vital Signs Last 24 Hrs  T(C): 36.9 (23 @ 10:56), Max: 37.3 (23 @ 23:00)  T(F): 98.4 (23 @ 10:56), Max: 99.2 (23 @ 23:00)  HR: 105 (23 @ 13:03) (77 - 105)  BP: 125/58 (23 @ 13:03) (101/57 - 132/65)  RR: 18 (23 @ 10:56) (18 - 18)  SpO2: 96% (23 @ 10:56) (94% - 97%)                    @ 07:01  -   @ 07:00  --------------------------------------------------------  IN: 840 mL / OUT: 2330 mL / NET: -1490 mL     @ 07:01  -   @ 13:40  --------------------------------------------------------  IN: 240 mL / OUT: 1300 mL / NET: -1060 mL          Daily     Daily Weight in k.8 (2023 08:00)            CAPILLARY BLOOD GLUCOSE  102 (2023 13:00)  78 (2023 12:32)  229 (2023 07:15)  233 (2023 03:00)  280 (2023 22:00)  108 (2023 18:00)                Drains:     MS         [  ] Drainage:                 L Pleural  [  ]  Drainage:                R Pleural  [  ]  Drainage:    Pacing Wires        [  ]   Settings:                                  Isolated  [  ]    Coumadin    [ ] YES          [  ]      NO                                   PHYSICAL EXAM        Neurology: alert and oriented x 3, nonfocal, no gross deficits  CV : s1 s2 RRR  Sternal Wound :  CDI , Stable  Lungs: cta  Abdomen: soft, nontender, nondistended, positive bowel sounds, last bowel movement no                      :    voiding       Extremities:    -  edema   /  -   calve tenderness ,    R leg  incisions cdi          acetaminophen     Tablet .. 650 milliGRAM(s) Oral every 6 hours PRN  ascorbic acid 500 milliGRAM(s) Oral two times a day  aspirin enteric coated 81 milliGRAM(s) Oral daily  atorvastatin 80 milliGRAM(s) Oral at bedtime  bisacodyl Suppository 10 milliGRAM(s) Rectal once  chlorhexidine 2% Cloths 1 Application(s) Topical daily  clopidogrel Tablet 75 milliGRAM(s) Oral daily  dextrose 5%. 1000 milliLiter(s) IV Continuous <Continuous>  dextrose 50% Injectable 50 milliLiter(s) IV Push every 15 minutes  enoxaparin Injectable 40 milliGRAM(s) SubCutaneous every 24 hours  gabapentin 100 milliGRAM(s) Oral every 8 hours  glucagon  Injectable 1 milliGRAM(s) IntraMuscular once  insulin glargine Injectable (LANTUS) 60 Unit(s) SubCutaneous at bedtime  insulin lispro (ADMELOG) corrective regimen sliding scale   SubCutaneous at bedtime  insulin lispro (ADMELOG) corrective regimen sliding scale   SubCutaneous three times a day before meals  insulin lispro Injectable (ADMELOG) 20 Unit(s) SubCutaneous three times a day before meals  levothyroxine 137 MICROGram(s) Oral daily  magnesium sulfate  IVPB 2 Gram(s) IV Intermittent once  metoprolol succinate ER 50 milliGRAM(s) Oral daily  oxyCODONE    IR 5 milliGRAM(s) Oral every 4 hours PRN  oxyCODONE    IR 10 milliGRAM(s) Oral every 4 hours PRN  pantoprazole    Tablet 40 milliGRAM(s) Oral before breakfast  polyethylene glycol 3350 17 Gram(s) Oral daily  senna 2 Tablet(s) Oral at bedtime  sodium chloride 0.9%. 1000 milliLiter(s) IV Continuous <Continuous>                    Physical Therapy Rec:   Home  [  ]   Home w/ PT  [  ]  Rehab  [  ]  Discussed with Cardiothoracic Team at AM rounds.

## 2023-04-24 NOTE — PROGRESS NOTE ADULT - PROVIDER SPECIALTY LIST ADULT
CT Surgery
Critical Care
Endocrinology
CT Surgery
CT Surgery
Endocrinology
Endocrinology

## 2023-04-24 NOTE — DISCHARGE NOTE PROVIDER - NSDCMRMEDTOKEN_GEN_ALL_CORE_FT
acetaminophen 325 mg oral tablet: 2 tab(s) orally every 6 hours as needed for  mild pain  Admelog 100 units/mL injectable solution: 20 unit(s) injectable 3 times a day based on fingerstick  aspirin 81 mg oral delayed release tablet: 1 tab(s) orally once a day  atorvastatin 80 mg oral tablet: 1 tab(s) orally once a day (at bedtime)  Basaglar KwikPen 100 units/mL subcutaneous solution: 60 unit(s) subcutaneous once a day (at bedtime)  clopidogrel 75 mg oral tablet: 1 tab(s) orally once a day  ferrous sulfate 325 mg (65 mg elemental iron) oral delayed release tablet: 1 tab(s) orally once a day  furosemide 20 mg oral tablet: 1 tab(s) orally once a day  gabapentin 100 mg oral capsule: 1 cap(s) orally every 8 hours  levothyroxine 137 mcg (0.137 mg) oral tablet: 1 tab(s) orally once a day  metoprolol succinate 50 mg oral tablet, extended release: 1 tab(s) orally once a day  oxyCODONE 5 mg oral tablet: 1 tab(s) orally every 6 hours as needed for  severe pain MDD: 4  pantoprazole 40 mg oral delayed release tablet: 1 tab(s) orally once a day (before a meal)  senna leaf extract oral tablet: 2 tab(s) orally once a day (at bedtime)  spironolactone 25 mg oral tablet: 1 tab(s) orally once a day

## 2023-04-24 NOTE — DISCHARGE NOTE PROVIDER - CARE PROVIDER_API CALL
Kam Tirado)  Thoracic and Cardiac Surgery  [address] [place] [address]  Phone: [phone]  Fax: [phone]  Scheduled Appointment: 05/01/2023 01:45 PM    Mike Shipman)  Cardiology; Internal Medicine; Interventional Cardiology  [address] [place] [address]  Phone: [phone]  Fax: [phone]  Follow Up Time: 2 weeks    Lemuel Infante (DO)  Internal Medicine  [address], [address]  [place] [address]  Phone: [phone]  Fax: [phone]  Follow Up Time: 2 weeks

## 2023-04-24 NOTE — DISCHARGE NOTE NURSING/CASE MANAGEMENT/SOCIAL WORK - PATIENT PORTAL LINK FT
You can access the FollowMyHealth Patient Portal offered by [facility] by registering at the following website: [email or web address]. By joining [employer]’s FollowMyHealth portal, you will also be able to view your health information using other applications (apps) compatible with our system.

## 2023-04-24 NOTE — OCCUPATIONAL THERAPY INITIAL EVALUATION ADULT - WEIGHT-BEARING RESTRICTIONS: STAND/SIT, REHAB EVAL
Care Suites Post Procedure Note    Patient Information  Name: Vikash Burgos  Age: 61 year old    Post Procedure  Time patient returned to Care Suites: 1045  Concerns/abnormal assessment: NA  If abnormal assessment, provider notified: N/A  Plan/Other: post care as ordered.  Right groin with stopcock/stitch in place, denies pain.     Katt Narvaez RN      weight-bearing as tolerated

## 2023-04-24 NOTE — DISCHARGE NOTE PROVIDER - HOSPITAL COURSE
52 year old female with hx of IDDM  since age 7, HTN, CAD s/p  cardiac stents. Has been experiencing "heartburn, dizziness with minimal activity evaluation by cardiology referred for cardiac cath.  Results revealed triple vessel disease referred for bypass.     *Pt c/o dizziness , lightheaded at PST , we called Dr Tirado (surgeon) . He spoke with patient. Pt can go home today after PST.  We instructed if she has heartburn, dizziness, lightheaded at home call surgeon office and or go to ER.    4/20 s/p C3L   4/22 Endo consulted - IDDM - insulin gtt stopped - Moved to SDU  4/23 vss glucose well controlled - will d/c med ct today - d/c plan home  4/24  chest tubes out, pw d/c.  Glucose controll .  Endo following  Scant serous drainage from  distal incision, wbc elevated but trending down, afebrile.  The patient wants to go home today.  D/c planning as per Dr Tirado  Transition to [facility]

## 2023-04-24 NOTE — OCCUPATIONAL THERAPY INITIAL EVALUATION ADULT - PERTINENT HX OF CURRENT PROBLEM, REHAB EVAL
52 year old female with hx of IDDM  since age 7, HTN, CAD s/p  cardiac stents. Has been experiencing "heartburn, dizziness with minimal activity evaluation by cardiology referred for cardiac cath.  Results revealed triple vessel disease referred for bypass. Pt c/o dizziness , lightheaded at PST , we called Dr Tirado (surgeon) . He spoke with patient. Pt can go home today after PST.  We instructed if she has heartburn, dizziness, lightheaded at home call surgeon office and or go to ER. s/p CABG 4/20
52 year old female with hx of IDDM  since age 7, HTN, CAD s/p  cardiac stents. Has been experiencing "heartburn, dizziness with minimal activity evaluation by cardiology referred for cardiac cath.  Results revealed triple vessel disease referred for bypass. POD 1 C3L- LIMA to LAD, R SVG to OM1 and OM2

## 2023-04-24 NOTE — PROGRESS NOTE ADULT - PROBLEM SELECTOR PLAN 1
Continue with ASA81/Plavix 75mg daily   Continue with [id number]   Titrate up as tolerated   +PW VVI / RIJ in place    Cough and deep breathe, Incentive Spirometry Q1h, Chest PT.  Ambulate 4x daily as tolerated and with PT     C/W GI prophylaxis on protonix and DVT prophylaxis on SQ LVX
Will continue current insulin regimen for now. Will continue monitoring  blood sugars, will Follow up.  Patient counseled for compliance with consistent low carb diet.
Continue with ASA81/Plavix 75mg daily   Continue with [id number]   Titrate up as tolerated   +PW VVI / RIJ in place   Med CT in place - remove Sunday if min drainage   Cough and deep breathe, Incentive Spirometry Q1h, Chest PT.  Ambulate 4x daily as tolerated and with PT     C/W GI prophylaxis on protonix and DVT prophylaxis on SQ LVX
Will increase Lantus to 60 units at bed time.  Patient counseled for compliance with consistent low carb diet.  .
Continue with ASA81/Plavix 75mg daily   Continue with [id number]   Titrate up as tolerated   +PW VVI / RIJ in place    Cough and deep breathe, Incentive Spirometry Q1h, Chest PT.  Ambulate 4x daily as tolerated and with PT     C/W GI prophylaxis on protonix and DVT prophylaxis on SQ LVX
Will increase Lantus to 65 units at bed time.  Will increase Admelog to 26 units before each meal in addition to Admelog correction scale coverage.  Patient counseled for compliance with consistent low carb diet.  .

## 2023-04-24 NOTE — PROGRESS NOTE ADULT - PROBLEM SELECTOR PROBLEM 1
S/P CABG x 3
S/P CABG x 3
Insulin dependent type 1 diabetes mellitus
S/P CABG x 3

## 2023-04-25 ENCOUNTER — NON-APPOINTMENT (OUTPATIENT)
Age: 53
End: 2023-04-25

## 2023-04-25 PROBLEM — R42 DIZZINESS AND GIDDINESS: Chronic | Status: ACTIVE | Noted: 2023-04-17

## 2023-04-25 RX ORDER — ADHESIVE TAPE 3"X 2.3 YD
50 MCG TAPE, NON-MEDICATED TOPICAL
Qty: 30 | Refills: 2 | Status: DISCONTINUED | COMMUNITY
Start: 2021-02-03 | End: 2023-04-25

## 2023-04-25 RX ORDER — INSULIN LISPRO 100 U/ML
100 INJECTION, SOLUTION SUBCUTANEOUS
Qty: 4 | Refills: 1 | Status: DISCONTINUED | COMMUNITY
Start: 2018-12-06 | End: 2023-04-25

## 2023-04-25 RX ORDER — FAMOTIDINE 40 MG/1
40 TABLET, FILM COATED ORAL DAILY
Qty: 90 | Refills: 3 | Status: DISCONTINUED | COMMUNITY
Start: 2020-04-13 | End: 2023-04-25

## 2023-04-25 RX ORDER — AZELASTINE HYDROCHLORIDE 137 UG/1
137 SPRAY, METERED NASAL TWICE DAILY
Qty: 1 | Refills: 3 | Status: DISCONTINUED | COMMUNITY
Start: 2019-08-30 | End: 2023-04-25

## 2023-04-25 RX ORDER — ENALAPRIL MALEATE 20 MG/1
20 TABLET ORAL
Qty: 90 | Refills: 2 | Status: DISCONTINUED | COMMUNITY
Start: 2019-07-31 | End: 2023-04-25

## 2023-04-25 RX ORDER — BUPROPION HYDROCHLORIDE 150 MG/1
1 TABLET, EXTENDED RELEASE ORAL
Qty: 60 | Refills: 0
Start: 2023-04-25 | End: 2023-05-24

## 2023-04-25 RX ORDER — B-COMPLEX WITH VITAMIN C
TABLET ORAL DAILY
Qty: 90 | Refills: 0 | Status: DISCONTINUED | COMMUNITY
Start: 2022-03-11 | End: 2023-04-25

## 2023-04-25 RX ORDER — AMLODIPINE BESYLATE 5 MG/1
5 TABLET ORAL
Qty: 90 | Refills: 3 | Status: DISCONTINUED | COMMUNITY
Start: 2018-02-06 | End: 2023-04-25

## 2023-04-25 RX ORDER — INSULIN GLARGINE 100 [IU]/ML
100 INJECTION, SOLUTION SUBCUTANEOUS
Qty: 3 | Refills: 2 | Status: DISCONTINUED | COMMUNITY
Start: 2019-10-22 | End: 2023-04-25

## 2023-04-25 RX ORDER — FEXOFENADINE HYDROCHLORIDE 180 MG/1
180 TABLET ORAL
Qty: 30 | Refills: 3 | Status: DISCONTINUED | COMMUNITY
Start: 2019-08-30 | End: 2023-04-25

## 2023-04-25 RX ORDER — HYDROCHLOROTHIAZIDE 12.5 MG/1
12.5 TABLET ORAL DAILY
Qty: 90 | Refills: 3 | Status: DISCONTINUED | COMMUNITY
Start: 2022-08-25 | End: 2023-04-25

## 2023-04-25 RX ORDER — CETIRIZINE HCL 10 MG
TABLET ORAL
Refills: 0 | Status: DISCONTINUED | COMMUNITY
End: 2023-04-25

## 2023-04-26 LAB — TROPONIN I SERPL-MCNC: 1.99 NG/ML

## 2023-04-28 ENCOUNTER — APPOINTMENT (OUTPATIENT)
Dept: CARE COORDINATION | Facility: HOME HEALTH | Age: 53
End: 2023-04-28
Payer: MEDICAID

## 2023-04-28 VITALS
SYSTOLIC BLOOD PRESSURE: 106 MMHG | RESPIRATION RATE: 12 BRPM | HEART RATE: 78 BPM | OXYGEN SATURATION: 96 % | DIASTOLIC BLOOD PRESSURE: 58 MMHG

## 2023-04-28 PROCEDURE — 99024 POSTOP FOLLOW-UP VISIT: CPT

## 2023-04-28 NOTE — HISTORY OF PRESENT ILLNESS
[FreeTextEntry1] : 52 year old female with hx of IDDM since age 7, HTN, CAD s/p  cardiac stents.\par Has been experiencing "heartburn, dizziness with minimal activity evaluation by\[facility] cardiology referred for cardiac cath.  Results revealed triple vessel disease referred for bypass.\par 4/20 s/p C3L\par 4/22 Endo consulted - IDDM - insulin gtt stopped - Moved to SDU\par 4/23 vss glucose well controlled - will d/c med ct today - d/c plan home\par 4/24  chest tubes out, pw d/c.  Scant serous drainage from  distal incision, wbc elevated but trending down, afebrile. The patient wants to go home today.  Cleared for DC Home by Dr Tirado\par 4/28- Seen by [facility] NP for a f/u home visit. Emotional support and education provided. All questions answered. Pt overall is recovering well. She is trying to regulate her blood glucoses. During day her FS is  but overnight her FS drops to 56. She is decreasing long acting insulin dose nightly. She is aware to see her Endo MD ASAP.\par

## 2023-04-28 NOTE — REASON FOR VISIT
[Post Hospitalization] : a post hospitalization visit [FreeTextEntry1] : FOLLOW YOUR HEART - Transitional Care Management Program - [facility]

## 2023-04-28 NOTE — ASSESSMENT
[FreeTextEntry1] : Pt recovering well at home s/p cardiac surgery. Pt has 3 grown children. Pt's  has Thyroid cancer since 2017, he is taking oral chemo. Pt is notably upset in regards to husbands health. Offered  consult but pt has denied need for it. Pt aware she can let me know if she decides she would want one in future. Reviewed all medications and dosages with pt understanding. Pt has all medications in home and is taking as prescribed. Pt is aware of F/U appts scheduled and that need to be scheduled as listed below.\par

## 2023-04-28 NOTE — PHYSICAL EXAM
[Sclera] : the sclera and conjunctiva were normal [Neck Appearance] : the appearance of the neck was normal [] : no respiratory distress [Respiration, Rhythm And Depth] : normal respiratory rhythm and effort [Auscultation Breath Sounds / Voice Sounds] : lungs were clear to auscultation bilaterally [Exaggerated Use Of Accessory Muscles For Inspiration] : no accessory muscle use [Apical Impulse] : the apical impulse was normal [Heart Rate And Rhythm] : heart rate was normal and rhythm regular [Heart Sounds] : normal S1 and S2 [Heart Sounds Gallop] : no gallops [Murmurs] : no murmurs [Heart Sounds Pericardial Friction Rub] : no pericardial rub [FreeTextEntry1] : MSI & CT sites without erythema, drainage or warmth, with edges well approximated. R leg SVG Site with scant sanguineous drainage, w/o erythema, swelling, or warmth.  Sternum stable. BL LE - minimal edema. [Examination Of The Chest] : the chest was normal in appearance [Diminished Respiratory Excursion] : normal chest expansion [Chest Visual Inspection Thoracic Asymmetry] : no chest asymmetry [Bowel Sounds] : normal bowel sounds [Abdomen Soft] : soft [Abdomen Tenderness] : non-tender [Abnormal Walk] : normal gait [Skin Color & Pigmentation] : normal skin color and pigmentation [Sensation] : the sensory exam was normal to light touch and pinprick [Motor Exam] : the motor exam was normal [No Focal Deficits] : no focal deficits [Oriented To Time, Place, And Person] : oriented to person, place, and time [Impaired Insight] : insight and judgment were intact [Affect] : the affect was normal [Mood] : the mood was normal

## 2023-05-01 PROBLEM — Z09 POSTOPERATIVE FOLLOW-UP: Status: ACTIVE | Noted: 2023-05-01

## 2023-05-01 PROBLEM — Z95.1 S/P CABG X 3: Status: RESOLVED | Noted: 2023-05-01 | Resolved: 2023-05-01

## 2023-05-03 ENCOUNTER — APPOINTMENT (OUTPATIENT)
Dept: CARDIOTHORACIC SURGERY | Facility: CLINIC | Age: 53
End: 2023-05-03
Payer: MEDICAID

## 2023-05-03 VITALS — WEIGHT: 180 LBS | BODY MASS INDEX: 29.05 KG/M2

## 2023-05-03 VITALS
SYSTOLIC BLOOD PRESSURE: 143 MMHG | RESPIRATION RATE: 6 BRPM | OXYGEN SATURATION: 98 % | HEART RATE: 67 BPM | TEMPERATURE: 99.3 F | HEIGHT: 66 IN | DIASTOLIC BLOOD PRESSURE: 80 MMHG

## 2023-05-03 DIAGNOSIS — Z95.1 PRESENCE OF AORTOCORONARY BYPASS GRAFT: ICD-10-CM

## 2023-05-03 DIAGNOSIS — Z09 ENCOUNTER FOR FOLLOW-UP EXAMINATION AFTER COMPLETED TREATMENT FOR CONDITIONS OTHER THAN MALIGNANT NEOPLASM: ICD-10-CM

## 2023-05-03 PROCEDURE — 99024 POSTOP FOLLOW-UP VISIT: CPT

## 2023-05-03 RX ORDER — FUROSEMIDE 20 MG/1
20 TABLET ORAL
Qty: 90 | Refills: 3 | Status: COMPLETED | COMMUNITY
Start: 2023-04-25 | End: 2023-05-03

## 2023-05-03 RX ORDER — GABAPENTIN 100 MG/1
100 CAPSULE ORAL 3 TIMES DAILY
Qty: 90 | Refills: 0 | Status: COMPLETED | COMMUNITY
Start: 2023-04-25 | End: 2023-05-03

## 2023-05-03 RX ORDER — SENNA 8.6 MG/1
8.6 TABLET, FILM COATED ORAL
Qty: 40 | Refills: 0 | Status: COMPLETED | COMMUNITY
Start: 2023-04-25 | End: 2023-05-03

## 2023-05-03 RX ORDER — SPIRONOLACTONE 25 MG/1
25 TABLET ORAL DAILY
Qty: 5 | Refills: 0 | Status: COMPLETED | COMMUNITY
Start: 2023-04-25 | End: 2023-05-03

## 2023-05-11 ENCOUNTER — NON-APPOINTMENT (OUTPATIENT)
Age: 53
End: 2023-05-11

## 2023-05-16 ENCOUNTER — NON-APPOINTMENT (OUTPATIENT)
Age: 53
End: 2023-05-16

## 2023-05-16 DIAGNOSIS — E53.8 DEFICIENCY OF OTHER SPECIFIED B GROUP VITAMINS: ICD-10-CM

## 2023-05-16 LAB
25(OH)D3 SERPL-MCNC: 25.7 NG/ML
ALBUMIN SERPL ELPH-MCNC: 4.1 G/DL
ALP BLD-CCNC: 133 U/L
ALT SERPL-CCNC: 20 U/L
ANION GAP SERPL CALC-SCNC: 13 MMOL/L
AST SERPL-CCNC: 18 U/L
BILIRUB SERPL-MCNC: 0.4 MG/DL
BUN SERPL-MCNC: 11 MG/DL
CALCIUM SERPL-MCNC: 9.5 MG/DL
CHLORIDE SERPL-SCNC: 104 MMOL/L
CHOLEST SERPL-MCNC: 138 MG/DL
CO2 SERPL-SCNC: 24 MMOL/L
CREAT SERPL-MCNC: 0.71 MG/DL
CREAT SPEC-SCNC: 244 MG/DL
EGFR: 101 ML/MIN/1.73M2
ESTIMATED AVERAGE GLUCOSE: 126 MG/DL
FOLATE SERPL-MCNC: >20 NG/ML
FRUCTOSAMINE SERPL-MCNC: 263 UMOL/L
GLUCOSE SERPL-MCNC: 331 MG/DL
GLYCOMARK.: 9.6 UG/ML
HBA1C MFR BLD HPLC: 6 %
HDLC SERPL-MCNC: 43 MG/DL
LDLC SERPL CALC-MCNC: 53 MG/DL
LDLC SERPL DIRECT ASSAY-MCNC: 68 MG/DL
MAGNESIUM SERPL-MCNC: 1.8 MG/DL
MICROALBUMIN 24H UR DL<=1MG/L-MCNC: 4.5 MG/DL
MICROALBUMIN/CREAT 24H UR-RTO: 18 MG/G
NONHDLC SERPL-MCNC: 96 MG/DL
POTASSIUM SERPL-SCNC: 5.1 MMOL/L
PROT SERPL-MCNC: 6.4 G/DL
SODIUM SERPL-SCNC: 140 MMOL/L
T3FREE SERPL-MCNC: 1.67 PG/ML
T4 FREE SERPL-MCNC: 1.5 NG/DL
TRIGL SERPL-MCNC: 215 MG/DL
TSH SERPL-ACNC: 2.65 UIU/ML
VIT B12 SERPL-MCNC: 669 PG/ML

## 2023-05-18 NOTE — CONSULT LETTER
[Dear  ___] : Dear  [unfilled], [FreeTextEntry2] : Dr.Lawrence Shipman [FreeTextEntry1] : I had the pleasure of seeing your patient, GORDON BLANCO, in my office today. \par \par We take a multidisciplinary team approach to patient care and consider you, the referring physician, an extension of our team. We will maintain an open line of communication with you throughout your patient's treatment course.  \par \par As you recall, she is a 53 year old female status post CABG X 3 (LIMA to LAD, SVG to OM1,OM2)  on 4/28/23.The patient presents to the office today for a routine postop visit . I have enclosed a copy for your records.\par \par Today on exam patient's lungs clear bilaterally, normal sinus rhythm, sternum stable, incision clean, dry and intact. RT SVG site is clean, dry and intact.  No peripheral edema noted . Therefore, I have recommended that the patient to follow up with Cardiologist and PCP. \par \par I appreciate the opportunity to care for your patient at [facility] . If there are any questions or concerns, please call me at [phone]. \par \par Please see my note below. \par \par Sincerely, \par \par \par \par \par \par Kam Tirado MD\par Director\par [facility]\par Professor \par Cardiovascular & Thoracic Surgery\par [facility]\[employer].\par \par \par [facility]:\par Department of Cardiovascular and Thoracic Surgery\[address], [address]\par Office: [phone]\par Fax: [phone]\par \par Zeinab Bess\par  \[facility] Department of Cardiovascular and Thoracic Surgery\[address], [address]\par Phone: [phone]\par Office: [phone]\par

## 2023-05-18 NOTE — REASON FOR VISIT
[Family Member] : family member [de-identified] : CABG X 3 (LIMA to LAD, SVG to OM1,OM2)  [de-identified] : 4/28/23

## 2023-05-18 NOTE — PHYSICAL EXAM
[] : no respiratory distress [Respiration, Rhythm And Depth] : normal respiratory rhythm and effort [Auscultation Breath Sounds / Voice Sounds] : lungs were clear to auscultation bilaterally [Apical Impulse] : the apical impulse was normal [Heart Rate And Rhythm] : heart rate was normal and rhythm regular [Heart Sounds] : normal S1 and S2 [Murmurs] : no murmurs [Clean] : clean [Dry] : dry [Healing Well] : healing well [No Edema] : no edema [FreeTextEntry5] : RT SVG site , mild oozing

## 2023-05-18 NOTE — END OF VISIT
[FreeTextEntry3] : \par I personally scribed for ROSANNA GONZALEZ on May  3 2023  1:45PM . \par \par \par \par \par Physician Attestation:\par Documented by ORION CHANDLER acting as a scribe for ROSANNA GONZALEZ 05/03/2023 . \par                 All medical record entries made by the Scribe were at my, ROSANNA GONZALEZ , direction and personally dictated by me on 05/03/2023 . I have reviewed the chart and agree that the record accurately reflects my personal performance of the history, physical exam, assessment and plan\par \par

## 2023-05-18 NOTE — ASSESSMENT
[FreeTextEntry1] : Ms. BLANCO is a 53 year old female with  past medical history of IDDM  since age 7, HTN, CAD s/p  cardiac stents. Has been experiencing "heartburn, dizziness with minimal activity evaluation by cardiology referred for cardiac cath.  Results revealed triple vessel disease referred for bypass. \par \par She is S/P CABG X 3 (LIMA to LAD, SVG to OM1,OM2) on 4/28/23. Post op course unremarkable. Discharged to Home. She is here for post op visit. \par \par \par Today he presents and reports that she has shortness of breath with walking, LT hand numbness  and dizziness. She also reported that she had Nausea and dizziness yesterday. Denies any chest pain, palpitations, syncope or pedal edema.\par \par Today on exam patient's lungs clear bilaterally, normal sinus rhythm, sternum stable, incision clean, dry and intact. RT SVG site is clean, dry and intact.  No peripheral edema noted. \par \par  Instructed patient on importance of optimal glycemic control, daily showering, daily weights, any signs of fever (temperature greater than 101F, chills,  incentive spirometer use, and increase ambulation as tolerated. Instructed to call office with any signs or symptoms of infection or weight gain of 2 or more pounds in 1 day or 3 or more pounds in 1 week.  \par \par Discussed intake of plant based foods, including vegetables, fruits, and whole grain foods: legumes, nuts and seeds, fish or seafood, lean meats, and non-fat or low-fat diary foods. Plant based oils (non-tropical) in place of solid fats. Instructed patient to limit intake of high fat meats and processed meats, high-fat diary foods, dietary cholesterol and sodium, foods and beverages with added sugars. \par \par Plan:\par 1) Continue current medication regimen\par 2) Follow up with cardiologist ( Dr.L Shipman on 5/25/23) and PCP \par 3) May return on as needed basis \par 4) Ambulate as tolerated \par 5) Virtual Cardiac Rehab referral \par 6) Continue to increase activity and walk daily as tolerated. Continue to use incentive spirometer. \par 7) Keep legs elevated above heart when resting/sitting/sleeping. \par 8) Call MD if you experience fever, fatigue, dizziness, confusion, syncope, shortness of breath, chest pain not relieved with analgesics, increased redness/drainage from the surgical  incision site\par 9) Follow up after 2 weeks if the SVG site drainage doesn't stop \par

## 2023-05-21 ENCOUNTER — TRANSCRIPTION ENCOUNTER (OUTPATIENT)
Age: 53
End: 2023-05-21

## 2023-05-22 ENCOUNTER — TRANSCRIPTION ENCOUNTER (OUTPATIENT)
Age: 53
End: 2023-05-22

## 2023-05-23 ENCOUNTER — NON-APPOINTMENT (OUTPATIENT)
Age: 53
End: 2023-05-23

## 2023-05-25 ENCOUNTER — APPOINTMENT (OUTPATIENT)
Dept: CARDIOLOGY | Facility: CLINIC | Age: 53
End: 2023-05-25
Payer: MEDICAID

## 2023-05-25 ENCOUNTER — NON-APPOINTMENT (OUTPATIENT)
Age: 53
End: 2023-05-25

## 2023-05-25 VITALS
HEIGHT: 65 IN | HEART RATE: 71 BPM | DIASTOLIC BLOOD PRESSURE: 78 MMHG | OXYGEN SATURATION: 98 % | BODY MASS INDEX: 29.16 KG/M2 | WEIGHT: 175 LBS | SYSTOLIC BLOOD PRESSURE: 131 MMHG

## 2023-05-25 PROCEDURE — 99213 OFFICE O/P EST LOW 20 MIN: CPT | Mod: 25

## 2023-05-25 PROCEDURE — 93000 ELECTROCARDIOGRAM COMPLETE: CPT

## 2023-06-07 NOTE — HISTORY OF PRESENT ILLNESS
[FreeTextEntry1] : Patient with Type 1 diabetes, early onset CAD.  Recent 3 vessel CABG which was uneventful.  Patient still has some lack of energy but no chest discomfort or dyspnea.

## 2023-06-07 NOTE — PHYSICAL EXAM
[Well Developed] : well developed [Well Nourished] : well nourished [No Acute Distress] : no acute distress [Normal Conjunctiva] : normal conjunctiva [Normal Venous Pressure] : normal venous pressure [No Carotid Bruit] : no carotid bruit [Normal S1, S2] : normal S1, S2 [No Murmur] : no murmur [No Rub] : no rub [No Gallop] : no gallop [Good Air Entry] : good air entry [No Respiratory Distress] : no respiratory distress  [Soft] : abdomen soft [Non Tender] : non-tender [No Masses/organomegaly] : no masses/organomegaly [Normal Bowel Sounds] : normal bowel sounds [Normal Gait] : normal gait [No Edema] : no edema [No Cyanosis] : no cyanosis [No Clubbing] : no clubbing [No Varicosities] : no varicosities [No Rash] : no rash [No Skin Lesions] : no skin lesions [Moves all extremities] : moves all extremities [No Focal Deficits] : no focal deficits [Normal Speech] : normal speech [Alert and Oriented] : alert and oriented [Normal memory] : normal memory [de-identified] : diminished BS on the left

## 2023-06-07 NOTE — DISCUSSION/SUMMARY
[FreeTextEntry1] : Satisfactory recuperation from 3 vessel bypass surgery.  No recurrent ischemic symptoms. [EKG obtained to assist in diagnosis and management of assessed problem(s)] : EKG obtained to assist in diagnosis and management of assessed problem(s)

## 2023-06-19 RX ORDER — METOPROLOL SUCCINATE 50 MG/1
50 TABLET, EXTENDED RELEASE ORAL
Qty: 90 | Refills: 3 | Status: ACTIVE | COMMUNITY
Start: 2022-08-25 | End: 1900-01-01

## 2023-06-26 ENCOUNTER — APPOINTMENT (OUTPATIENT)
Dept: ENDOCRINOLOGY | Facility: CLINIC | Age: 53
End: 2023-06-26
Payer: MEDICAID

## 2023-06-26 ENCOUNTER — NON-APPOINTMENT (OUTPATIENT)
Age: 53
End: 2023-06-26

## 2023-06-26 PROCEDURE — 95251 CONT GLUC MNTR ANALYSIS I&R: CPT

## 2023-07-10 ENCOUNTER — NON-APPOINTMENT (OUTPATIENT)
Age: 53
End: 2023-07-10

## 2023-07-11 NOTE — ASU PREOP CHECKLIST - NS PREOP CHK CHLOROHEX WASH
N/A Cosentyx Counseling:  I discussed with the patient the risks of Cosentyx including but not limited to worsening of Crohn's disease, immunosuppression, allergic reactions and infections.  The patient understands that monitoring is required including a PPD at baseline and must alert us or the primary physician if symptoms of infection or other concerning signs are noted.

## 2023-07-27 RX ORDER — BLOOD SUGAR DIAGNOSTIC
STRIP MISCELLANEOUS DAILY
Qty: 2 | Refills: 3 | Status: ACTIVE | COMMUNITY
Start: 2023-07-27 | End: 1900-01-01

## 2023-08-24 ENCOUNTER — NON-APPOINTMENT (OUTPATIENT)
Age: 53
End: 2023-08-24

## 2023-08-24 ENCOUNTER — APPOINTMENT (OUTPATIENT)
Dept: CARDIOLOGY | Facility: CLINIC | Age: 53
End: 2023-08-24
Payer: MEDICAID

## 2023-08-24 VITALS
HEART RATE: 71 BPM | SYSTOLIC BLOOD PRESSURE: 119 MMHG | BODY MASS INDEX: 30.99 KG/M2 | HEIGHT: 65 IN | WEIGHT: 186 LBS | OXYGEN SATURATION: 99 % | DIASTOLIC BLOOD PRESSURE: 74 MMHG

## 2023-08-24 DIAGNOSIS — I25.10 ATHEROSCLEROTIC HEART DISEASE OF NATIVE CORONARY ARTERY W/OUT ANGINA PECTORIS: ICD-10-CM

## 2023-08-24 DIAGNOSIS — I70.219 ATHEROSCLEROSIS OF NATIVE ARTERIES OF EXTREMITIES WITH INTERMITTENT CLAUDICATION, UNSPECIFIED EXTREMITY: ICD-10-CM

## 2023-08-24 PROCEDURE — 99213 OFFICE O/P EST LOW 20 MIN: CPT | Mod: 25

## 2023-08-24 PROCEDURE — 93000 ELECTROCARDIOGRAM COMPLETE: CPT

## 2023-08-30 NOTE — DISCUSSION/SUMMARY
[FreeTextEntry1] : CAD.  S/P CABG with recurrent ischemic symptoms.  Possible claudication symptoms.  EDISON ordered. [EKG obtained to assist in diagnosis and management of assessed problem(s)] : EKG obtained to assist in diagnosis and management of assessed problem(s)

## 2023-08-30 NOTE — HISTORY OF PRESENT ILLNESS
[FreeTextEntry1] : 53 year old woman with Type 1 DM and early onset CAD.  S/P MI and mu;tiple stents.  Underwent mu;ltivessel CABG 4/2023.  She has mild musculoskeletal  chest wall complaints.  She has no angina or GIRALDO.  She recently has noted leg pain with walking.

## 2023-08-30 NOTE — PHYSICAL EXAM
[Well Developed] : well developed [Well Nourished] : well nourished [No Acute Distress] : no acute distress [Normal Conjunctiva] : normal conjunctiva [Normal Venous Pressure] : normal venous pressure [No Carotid Bruit] : no carotid bruit [Normal S1, S2] : normal S1, S2 [No Murmur] : no murmur [No Rub] : no rub [No Gallop] : no gallop [Good Air Entry] : good air entry [No Respiratory Distress] : no respiratory distress  [Soft] : abdomen soft [Non Tender] : non-tender [No Masses/organomegaly] : no masses/organomegaly [Normal Bowel Sounds] : normal bowel sounds [Normal Gait] : normal gait [No Edema] : no edema [No Cyanosis] : no cyanosis [No Clubbing] : no clubbing [No Varicosities] : no varicosities [No Rash] : no rash [No Skin Lesions] : no skin lesions [Moves all extremities] : moves all extremities [No Focal Deficits] : no focal deficits [Normal Speech] : normal speech [Alert and Oriented] : alert and oriented [Normal memory] : normal memory [de-identified] : diminished BS on the left

## 2023-09-08 NOTE — ED ADULT NURSE NOTE - CHIEF COMPLAINT QUOTE
Pt presents after an episode of palpatation and sob. Episode lasted 2-3 minutes. States she does have anxiety and has had some increased anxiety recently.
chest pain upper epigastric area stared since 8pm feels better after Vilma given by EMS , smoke and hx 2 stent 10 years ago

## 2023-10-11 ENCOUNTER — APPOINTMENT (OUTPATIENT)
Dept: ENDOCRINOLOGY | Facility: CLINIC | Age: 53
End: 2023-10-11

## 2023-12-08 DIAGNOSIS — E53.8 DEFICIENCY OF OTHER SPECIFIED B GROUP VITAMINS: ICD-10-CM

## 2023-12-08 RX ORDER — FLASH GLUCOSE SCANNING READER
EACH MISCELLANEOUS
Qty: 1 | Refills: 0 | Status: DISCONTINUED | COMMUNITY
Start: 2023-05-05 | End: 2023-12-08

## 2024-02-07 ENCOUNTER — APPOINTMENT (OUTPATIENT)
Dept: ENDOCRINOLOGY | Facility: CLINIC | Age: 54
End: 2024-02-07
Payer: MEDICAID

## 2024-02-07 VITALS
HEART RATE: 82 BPM | OXYGEN SATURATION: 94 % | HEIGHT: 65 IN | DIASTOLIC BLOOD PRESSURE: 60 MMHG | SYSTOLIC BLOOD PRESSURE: 120 MMHG | TEMPERATURE: 98.6 F | WEIGHT: 191.25 LBS | BODY MASS INDEX: 31.86 KG/M2

## 2024-02-07 LAB
GLUCOSE BLDC GLUCOMTR-MCNC: 139
HBA1C MFR BLD HPLC: 7.9

## 2024-02-07 PROCEDURE — 83036 HEMOGLOBIN GLYCOSYLATED A1C: CPT | Mod: QW

## 2024-02-07 PROCEDURE — 82962 GLUCOSE BLOOD TEST: CPT

## 2024-02-07 PROCEDURE — 99214 OFFICE O/P EST MOD 30 MIN: CPT | Mod: 25

## 2024-02-08 ENCOUNTER — NON-APPOINTMENT (OUTPATIENT)
Age: 54
End: 2024-02-08

## 2024-02-08 DIAGNOSIS — D72.829 ELEVATED WHITE BLOOD CELL COUNT, UNSPECIFIED: ICD-10-CM

## 2024-02-08 LAB
25(OH)D3 SERPL-MCNC: 45.1 NG/ML
ALBUMIN SERPL ELPH-MCNC: 4.5 G/DL
ALP BLD-CCNC: 121 U/L
ALT SERPL-CCNC: 28 U/L
ANION GAP SERPL CALC-SCNC: 13 MMOL/L
AST SERPL-CCNC: 22 U/L
BASOPHILS # BLD AUTO: 0.03 K/UL
BASOPHILS NFR BLD AUTO: 0.3 %
BILIRUB SERPL-MCNC: 0.4 MG/DL
BUN SERPL-MCNC: 11 MG/DL
CALCIUM SERPL-MCNC: 9.8 MG/DL
CHLORIDE SERPL-SCNC: 101 MMOL/L
CHOLEST SERPL-MCNC: 194 MG/DL
CO2 SERPL-SCNC: 27 MMOL/L
CREAT SERPL-MCNC: 0.55 MG/DL
CREAT SPEC-SCNC: 30 MG/DL
EGFR: 109 ML/MIN/1.73M2
EOSINOPHIL # BLD AUTO: 0.12 K/UL
EOSINOPHIL NFR BLD AUTO: 1.1 %
ESTIMATED AVERAGE GLUCOSE: 183 MG/DL
FRUCTOSAMINE SERPL-MCNC: 290 UMOL/L
GLUCOSE SERPL-MCNC: 93 MG/DL
GLYCOMARK.: 4.4 UG/ML
HBA1C MFR BLD HPLC: 8 %
HCT VFR BLD CALC: 41.9 %
HDLC SERPL-MCNC: 52 MG/DL
HGB BLD-MCNC: 13.5 G/DL
IMM GRANULOCYTES NFR BLD AUTO: 0.5 %
LDLC SERPL DIRECT ASSAY-MCNC: 102 MG/DL
LYMPHOCYTES # BLD AUTO: 2.07 K/UL
LYMPHOCYTES NFR BLD AUTO: 19.4 %
MAGNESIUM SERPL-MCNC: 2.1 MG/DL
MAN DIFF?: NORMAL
MCHC RBC-ENTMCNC: 32.2 GM/DL
MCHC RBC-ENTMCNC: 32.5 PG
MCV RBC AUTO: 100.7 FL
MICROALBUMIN 24H UR DL<=1MG/L-MCNC: 1.4 MG/DL
MICROALBUMIN/CREAT 24H UR-RTO: 47 MG/G
MONOCYTES # BLD AUTO: 0.76 K/UL
MONOCYTES NFR BLD AUTO: 7.1 %
NEUTROPHILS # BLD AUTO: 7.64 K/UL
NEUTROPHILS NFR BLD AUTO: 71.6 %
PLATELET # BLD AUTO: 257 K/UL
POTASSIUM SERPL-SCNC: 3.8 MMOL/L
PROT SERPL-MCNC: 7 G/DL
RBC # BLD: 4.16 M/UL
RBC # FLD: 12.8 %
SODIUM SERPL-SCNC: 141 MMOL/L
T3FREE SERPL-MCNC: 2.09 PG/ML
T4 FREE SERPL-MCNC: 1.1 NG/DL
TRIGL SERPL-MCNC: 237 MG/DL
TSH SERPL-ACNC: 6.2 UIU/ML
WBC # FLD AUTO: 10.67 K/UL

## 2024-02-08 RX ORDER — AMMONIUM LACTATE 5 %
5 LOTION (GRAM) TOPICAL TWICE DAILY
Qty: 1 | Refills: 0 | Status: ACTIVE | COMMUNITY
Start: 2024-02-08 | End: 1900-01-01

## 2024-02-08 RX ORDER — INSULIN LISPRO 100 U/ML
100 INJECTION, SOLUTION SUBCUTANEOUS
Qty: 4 | Refills: 1 | Status: ACTIVE | COMMUNITY
Start: 2023-04-25 | End: 1900-01-01

## 2024-02-08 RX ORDER — ELECTROLYTES/DEXTROSE
32G X 4 MM SOLUTION, ORAL ORAL
Qty: 5 | Refills: 3 | Status: ACTIVE | COMMUNITY
Start: 2024-02-08 | End: 1900-01-01

## 2024-02-08 RX ORDER — INSULIN GLARGINE 100 [IU]/ML
100 INJECTION, SOLUTION SUBCUTANEOUS
Qty: 3 | Refills: 1 | Status: ACTIVE | COMMUNITY
Start: 2023-04-25 | End: 1900-01-01

## 2024-02-11 NOTE — PHYSICAL EXAM
[Well Nourished] : well nourished [Alert] : alert [Well Developed] : well developed [No Acute Distress] : no acute distress [Normal Sclera/Conjunctiva] : normal sclera/conjunctiva [EOMI] : extra ocular movement intact [Normal Oropharynx] : the oropharynx was normal [No Proptosis] : no proptosis [Thyroid Not Enlarged] : the thyroid was not enlarged [No Respiratory Distress] : no respiratory distress [No Thyroid Nodules] : no palpable thyroid nodules [No Accessory Muscle Use] : no accessory muscle use [Clear to Auscultation] : lungs were clear to auscultation bilaterally [Regular Rhythm] : with a regular rhythm [Normal S1, S2] : normal S1 and S2 [Normal Rate] : heart rate was normal [Pedal Pulses Normal] : the pedal pulses are present [No Edema] : no peripheral edema [Not Tender] : non-tender [Normal Bowel Sounds] : normal bowel sounds [Normal Anterior Cervical Nodes] : no anterior cervical lymphadenopathy [Soft] : abdomen soft [Not Distended] : not distended [No Spinal Tenderness] : no spinal tenderness [Normal Posterior Cervical Nodes] : no posterior cervical lymphadenopathy [Spine Straight] : spine straight [No Stigmata of Cushings Syndrome] : no stigmata of Cushings Syndrome [No Rash] : no rash [Normal Strength/Tone] : muscle strength and tone were normal [Normal Gait] : normal gait [Normal Reflexes] : deep tendon reflexes were 2+ and symmetric [Acanthosis Nigricans] : no acanthosis nigricans [Oriented x3] : oriented to person, place, and time [No Tremors] : no tremors

## 2024-02-11 NOTE — HISTORY OF PRESENT ILLNESS
[FreeTextEntry1] : Ms. GORDON NOGUERA is a 52 year old female who returns with regard to a hx of type 1 dm. The diabetes has been present since age 8-9. She denies any history of retinopathy or nephropathy. With regard to neuropathy, she denies neurologic s/s.  Hx of 3 vessel cabg Apil 2023.  Has significant LE neuropathy-numbess adn tingling feet up toknee bilat.  For the diabetes, She is currently taking Basaglar 20 in am and usually 16  units hs and Admelog up to 10 units pre-meals and occasionally in between meals through "experience." Does take he radmelog post measl -tight after. Cut HS from 18-16 in light of a, low bg.  Bgup by 12 MN then come down to 70's to 80's   She is also taking Ozempic 0.5 mg weekly. She has been tolerating Ozempic very well.   passed awasy 09/25/2023.  Had been sick for seven yers,  She denies polyuria, polydipsia, or any visual changes. She too denies any skin lesions, skin breakdown or non-healing areas of skin. She too denies any podiatric concerns. Ophthalmologic evaluation is up to date. With regards to neuropathy, she does note a numbing sensation in her legs and a pain in her left arm.  Home glucose monitoring via [facility]ilda has shown values to be  POCT A1C returned today  7.9%   POCT glucose returned today at 139  Last visit had noted weight gain and some LE swelling. Placed on HCTZ as 12.5 mg as per her cardiologist. Additional medical history includes that of HTN, HLD, hypothyroidism, Hx of stent placement. She is taking LT4 137 mcg,  Daughter [facility] with feloides [facility]   PMD: Dr. Edwige PETER. Cardiologist Dr. Shipman

## 2024-02-13 RX ORDER — CLOTRIMAZOLE 10 MG/G
1 CREAM TOPICAL
Qty: 1 | Refills: 0 | Status: ACTIVE | COMMUNITY
Start: 2024-02-08 | End: 1900-01-01

## 2024-03-13 ENCOUNTER — RX RENEWAL (OUTPATIENT)
Age: 54
End: 2024-03-13

## 2024-03-28 NOTE — PRE-ANESTHESIA EVALUATION ADULT - NSANTHGENDERRD_ENT_A_CORE
No Render Risk Assessment In Note?: no Detail Level: Zone Additional Notes: Suggested extractions for blackheads and whiteheads in [place] with Gina Baker while continuing acne treatment.

## 2024-04-03 ENCOUNTER — APPOINTMENT (OUTPATIENT)
Dept: CARDIOLOGY | Facility: CLINIC | Age: 54
End: 2024-04-03
Payer: MEDICAID

## 2024-04-03 PROCEDURE — 93880 EXTRACRANIAL BILAT STUDY: CPT

## 2024-04-08 RX ORDER — PANTOPRAZOLE 40 MG/1
40 TABLET, DELAYED RELEASE ORAL DAILY
Qty: 90 | Refills: 3 | Status: ACTIVE | COMMUNITY
Start: 2023-04-25 | End: 1900-01-01

## 2024-04-17 ENCOUNTER — NON-APPOINTMENT (OUTPATIENT)
Age: 54
End: 2024-04-17

## 2024-04-17 ENCOUNTER — APPOINTMENT (OUTPATIENT)
Dept: INTERNAL MEDICINE | Facility: CLINIC | Age: 54
End: 2024-04-17
Payer: MEDICAID

## 2024-04-17 VITALS
BODY MASS INDEX: 32.65 KG/M2 | SYSTOLIC BLOOD PRESSURE: 157 MMHG | DIASTOLIC BLOOD PRESSURE: 77 MMHG | RESPIRATION RATE: 16 BRPM | WEIGHT: 196 LBS | TEMPERATURE: 97.9 F | HEART RATE: 81 BPM | OXYGEN SATURATION: 98 % | HEIGHT: 65 IN

## 2024-04-17 VITALS — SYSTOLIC BLOOD PRESSURE: 136 MMHG | DIASTOLIC BLOOD PRESSURE: 80 MMHG

## 2024-04-17 DIAGNOSIS — Z00.00 ENCOUNTER FOR GENERAL ADULT MEDICAL EXAMINATION W/OUT ABNORMAL FINDINGS: ICD-10-CM

## 2024-04-17 DIAGNOSIS — Z95.1 PRESENCE OF AORTOCORONARY BYPASS GRAFT: ICD-10-CM

## 2024-04-17 DIAGNOSIS — R39.15 URGENCY OF URINATION: ICD-10-CM

## 2024-04-17 PROCEDURE — G0296 VISIT TO DETERM LDCT ELIG: CPT

## 2024-04-17 PROCEDURE — 99386 PREV VISIT NEW AGE 40-64: CPT | Mod: 25

## 2024-04-17 PROCEDURE — 81003 URINALYSIS AUTO W/O SCOPE: CPT | Mod: QW,59

## 2024-04-17 RX ORDER — BUPROPION HYDROCHLORIDE 100 MG/1
TABLET, FILM COATED ORAL
Refills: 0 | Status: COMPLETED | COMMUNITY
End: 2024-04-17

## 2024-04-17 RX ORDER — GABAPENTIN 100 MG/1
100 CAPSULE ORAL EVERY 8 HOURS
Qty: 30 | Refills: 0 | Status: DISCONTINUED | COMMUNITY
Start: 2023-05-19 | End: 2024-04-17

## 2024-04-17 RX ORDER — DOXYCYCLINE HYCLATE 100 MG/1
100 CAPSULE ORAL
Qty: 8 | Refills: 0 | Status: COMPLETED | COMMUNITY
Start: 2023-05-23 | End: 2024-04-17

## 2024-04-17 RX ORDER — LORATADINE 10 MG/1
10 TABLET ORAL
Refills: 0 | Status: ACTIVE | COMMUNITY
Start: 2024-04-17

## 2024-04-19 LAB
25(OH)D3 SERPL-MCNC: 40.3 NG/ML
ALBUMIN SERPL ELPH-MCNC: 4.4 G/DL
ALP BLD-CCNC: 107 U/L
ALT SERPL-CCNC: 22 U/L
ANION GAP SERPL CALC-SCNC: 13 MMOL/L
APPEARANCE: CLEAR
AST SERPL-CCNC: 22 U/L
BACTERIA UR CULT: NORMAL
BACTERIA: NEGATIVE /HPF
BILIRUB SERPL-MCNC: 0.3 MG/DL
BILIRUBIN URINE: NEGATIVE
BLOOD URINE: NEGATIVE
BUN SERPL-MCNC: 13 MG/DL
CALCIUM SERPL-MCNC: 9.7 MG/DL
CAST: 0 /LPF
CHLORIDE SERPL-SCNC: 102 MMOL/L
CHOLEST SERPL-MCNC: 163 MG/DL
CO2 SERPL-SCNC: 25 MMOL/L
COLOR: YELLOW
CREAT SERPL-MCNC: 0.65 MG/DL
EGFR: 105 ML/MIN/1.73M2
EPITHELIAL CELLS: 1 /HPF
ESTIMATED AVERAGE GLUCOSE: 166 MG/DL
GLUCOSE QUALITATIVE U: NEGATIVE MG/DL
GLUCOSE SERPL-MCNC: 182 MG/DL
HBA1C MFR BLD HPLC: 7.4 %
HCT VFR BLD CALC: 39.6 %
HDLC SERPL-MCNC: 49 MG/DL
HGB BLD-MCNC: 13 G/DL
KETONES URINE: NEGATIVE MG/DL
LDLC SERPL CALC-MCNC: 74 MG/DL
LEUKOCYTE ESTERASE URINE: ABNORMAL
MCHC RBC-ENTMCNC: 32.3 PG
MCHC RBC-ENTMCNC: 32.8 GM/DL
MCV RBC AUTO: 98.5 FL
MICROSCOPIC-UA: NORMAL
NITRITE URINE: NEGATIVE
NONHDLC SERPL-MCNC: 115 MG/DL
PH URINE: 6.5
PLATELET # BLD AUTO: 233 K/UL
POTASSIUM SERPL-SCNC: 4.3 MMOL/L
PROT SERPL-MCNC: 6.4 G/DL
PROTEIN URINE: NORMAL MG/DL
RBC # BLD: 4.02 M/UL
RBC # FLD: 13 %
RED BLOOD CELLS URINE: 4 /HPF
SODIUM SERPL-SCNC: 140 MMOL/L
SPECIFIC GRAVITY URINE: 1.02
T3 SERPL-MCNC: 57 NG/DL
T4 FREE SERPL-MCNC: 1.1 NG/DL
TRIGL SERPL-MCNC: 251 MG/DL
TSH SERPL-ACNC: 9.32 UIU/ML
UROBILINOGEN URINE: 0.2 MG/DL
WBC # FLD AUTO: 8.64 K/UL
WHITE BLOOD CELLS URINE: 8 /HPF

## 2024-04-21 PROBLEM — Z95.1 S/P CABG X 3: Status: ACTIVE | Noted: 2023-05-01

## 2024-04-21 PROBLEM — R39.15 URINARY URGENCY: Status: ACTIVE | Noted: 2024-04-21

## 2024-04-21 NOTE — HEALTH RISK ASSESSMENT
[Yes] : Yes [2 - 4 times a month (2 pts)] : 2-4 times a month (2 points) [1 or 2 (0 pts)] : 1 or 2 (0 points) [Never (0 pts)] : Never (0 points) [No] : In the past 12 months have you used drugs other than those required for medical reasons? No [3] : 2) Feeling down, depressed, or hopeless for nearly every day (3) [Nearly Every Day (3)] : 7.) Trouble concentrating on things, such as reading a newspaper or watching television? Nearly every day [Not at All (0)] : 9.) Thoughts that you would be off dead or of hurting yourself in some way? Not at all [] :  [# Of Children ___] : has [unfilled] children [Feels Safe at Home] : Feels safe at home [Fully functional (bathing, dressing, toileting, transferring, walking, feeding)] : Fully functional (bathing, dressing, toileting, transferring, walking, feeding) [Fully functional (using the telephone, shopping, preparing meals, housekeeping, doing laundry, using] : Fully functional and needs no help or supervision to perform IADLs (using the telephone, shopping, preparing meals, housekeeping, doing laundry, using transportation, managing medications and managing finances) [Current] : Current [20 or more] : 20 or more [Audit-CScore] : 2 [[id number]] : 6 [[id number]] : 18 [MammogramDate] : due  [PapSmearDate] : due  [ColonoscopyDate] : never  [de-identified] : 3 daughters [de-identified] : in the process of applying for disability  [de-identified] : 2-3 cigs currently, but smoked since her teen years

## 2024-04-21 NOTE — HISTORY OF PRESENT ILLNESS
[FreeTextEntry1] : cpe/est care [de-identified] : GORDON BLANCO is a 53 year F who presents for CPE/est care PMHx DM1 with neuropathy, CAD s/p stents -> 3-vessel CABG 4/2023, Hashimoto's thyroiditis, HLD, HTN, obesity, Vit D deficiency  Reports she had a very stressful year. She is s/p 3-vessel CABG 4/2023. Her  then passed away in September. ]She is struggling with depressed mood and anxiety. Not currently getting any treatment.   States she had UTI 2 months s/p abx at . But since then, reports frequent suprapubic pressure and urge to urinate.   Cardio Dr. Shipman  Endo Dr. Infante

## 2024-04-21 NOTE — REVIEW OF SYSTEMS
[Anxiety] : anxiety [Depression] : depression [Negative] : Heme/Lymph [Suicidal] : not suicidal [FreeTextEntry8] : per hpi

## 2024-04-21 NOTE — ASSESSMENT
[FreeTextEntry1] : Health Care Maintenance - well visit - routine labs ordered - depression screen negative - ekg- follows with cardio, appt with Umberto Mancilla next week  - pap smear due, gyn referral given - mammogram due, script given - colonoscopy- never, GI referral given  - LD-CT lung- eligible, ordered - does not receive vaccines  - advised to get annual eye exams with optometry/ophthalmology, skin exams with dermatology, and dental exams  Depression screen positive  Anxiety -start lexapro 10mg qd -advised that SSRI take several weeks to reach maximum effect; AE discussed  -patient contracts for safety at this time; given number for [facility] crisis center should that change -referral given for therapist and psychiatrust  -I spent more than 5 minutes with the patient conducting a PHQ-9 screen and discussing results with patient during this encounter.  CAD S/p CABG 4/2023  -c/w asa, plavix, statin and BB -follows with cardio Dr. Shipman  DM1 -c/w basaglar 23u qam and 13u qhs -c/w admelog sliding scale -follows with endo Dr. Infante  HTN -bp elevated but improved on repeat -per chart review, she is prescribed enalapril 10mg qd but patient denies and states she is taking amlodipine 5mg, advised she check her bottle at home as well as confirm with cardio -c/w BB -lifestyle modifications including limiting alcohol/dietary sodium and increasing physical activity -discussed parameters of normal bp -advised to check bps at home and call/return if high or low  Hashimoto's -c/w synthroid  Urinary symptoms -s/p UTI 2 months ago, treated with abx, now has lingering suprapubic pressure and urge to urinate -ua/ucx ordered -if urine studies negative, see uro/gyn  RTC in 3 months

## 2024-04-25 ENCOUNTER — APPOINTMENT (OUTPATIENT)
Dept: CARDIOLOGY | Facility: CLINIC | Age: 54
End: 2024-04-25
Payer: MEDICAID

## 2024-04-25 ENCOUNTER — NON-APPOINTMENT (OUTPATIENT)
Age: 54
End: 2024-04-25

## 2024-04-25 VITALS
HEIGHT: 65 IN | WEIGHT: 188 LBS | OXYGEN SATURATION: 99 % | DIASTOLIC BLOOD PRESSURE: 81 MMHG | SYSTOLIC BLOOD PRESSURE: 137 MMHG | BODY MASS INDEX: 31.32 KG/M2 | HEART RATE: 65 BPM

## 2024-04-25 PROCEDURE — G2211 COMPLEX E/M VISIT ADD ON: CPT | Mod: NC,1L

## 2024-04-25 PROCEDURE — 99213 OFFICE O/P EST LOW 20 MIN: CPT | Mod: 25

## 2024-04-25 PROCEDURE — 93000 ELECTROCARDIOGRAM COMPLETE: CPT

## 2024-04-25 NOTE — PHYSICAL EXAM
[Well Developed] : well developed [Well Nourished] : well nourished [No Acute Distress] : no acute distress [Normal Conjunctiva] : normal conjunctiva [Normal Venous Pressure] : normal venous pressure [No Carotid Bruit] : no carotid bruit [Normal S1, S2] : normal S1, S2 [No Murmur] : no murmur [No Rub] : no rub [No Gallop] : no gallop [Good Air Entry] : good air entry [No Respiratory Distress] : no respiratory distress  [Soft] : abdomen soft [Non Tender] : non-tender [No Masses/organomegaly] : no masses/organomegaly [Normal Bowel Sounds] : normal bowel sounds [Normal Gait] : normal gait [No Edema] : no edema [No Cyanosis] : no cyanosis [No Clubbing] : no clubbing [No Varicosities] : no varicosities [No Rash] : no rash [No Skin Lesions] : no skin lesions [Moves all extremities] : moves all extremities [No Focal Deficits] : no focal deficits [Normal Speech] : normal speech [Alert and Oriented] : alert and oriented [Normal memory] : normal memory [de-identified] : diminished BS on the left

## 2024-04-25 NOTE — DISCUSSION/SUMMARY
[FreeTextEntry1] : CAD, s/p CABG.  No ischemic symptoms.  LDL in the 70's continue atorvastatin 80 ,mg [EKG obtained to assist in diagnosis and management of assessed problem(s)] : EKG obtained to assist in diagnosis and management of assessed problem(s)

## 2024-05-03 ENCOUNTER — APPOINTMENT (OUTPATIENT)
Dept: CARDIOLOGY | Facility: CLINIC | Age: 54
End: 2024-05-03
Payer: MEDICAID

## 2024-05-03 PROCEDURE — 93970 EXTREMITY STUDY: CPT

## 2024-06-03 RX ORDER — BLOOD-GLUCOSE SENSOR
EACH MISCELLANEOUS
Qty: 6 | Refills: 3 | Status: ACTIVE | COMMUNITY
Start: 2023-05-04 | End: 1900-01-01

## 2024-06-03 RX ORDER — ERGOCALCIFEROL 1.25 MG/1
1.25 MG CAPSULE, LIQUID FILLED ORAL
Qty: 4 | Refills: 0 | Status: ACTIVE | COMMUNITY
Start: 2023-05-16 | End: 1900-01-01

## 2024-06-03 RX ORDER — ESCITALOPRAM OXALATE 10 MG/1
10 TABLET ORAL
Qty: 90 | Refills: 0 | Status: ACTIVE | COMMUNITY
Start: 2024-04-17 | End: 1900-01-01

## 2024-06-03 RX ORDER — CHLORHEXIDINE GLUCONATE 4 %
1000 LIQUID (ML) TOPICAL DAILY
Qty: 90 | Refills: 0 | Status: ACTIVE | COMMUNITY
Start: 2023-07-27 | End: 1900-01-01

## 2024-06-13 RX ORDER — ENALAPRIL MALEATE 20 MG/1
20 TABLET ORAL DAILY
Qty: 135 | Refills: 3 | Status: ACTIVE | COMMUNITY
Start: 2017-12-20 | End: 1900-01-01

## 2024-06-18 ENCOUNTER — APPOINTMENT (OUTPATIENT)
Dept: INTERNAL MEDICINE | Facility: CLINIC | Age: 54
End: 2024-06-18
Payer: MEDICAID

## 2024-06-18 VITALS
DIASTOLIC BLOOD PRESSURE: 83 MMHG | HEIGHT: 65 IN | SYSTOLIC BLOOD PRESSURE: 167 MMHG | HEART RATE: 66 BPM | WEIGHT: 190 LBS | TEMPERATURE: 98.4 F | BODY MASS INDEX: 31.65 KG/M2 | OXYGEN SATURATION: 96 %

## 2024-06-18 VITALS — DIASTOLIC BLOOD PRESSURE: 72 MMHG | SYSTOLIC BLOOD PRESSURE: 140 MMHG

## 2024-06-18 DIAGNOSIS — F32.A ANXIETY DISORDER, UNSPECIFIED: ICD-10-CM

## 2024-06-18 DIAGNOSIS — I25.10 ATHEROSCLEROTIC HEART DISEASE OF NATIVE CORONARY ARTERY W/OUT ANGINA PECTORIS: ICD-10-CM

## 2024-06-18 DIAGNOSIS — F41.9 ANXIETY DISORDER, UNSPECIFIED: ICD-10-CM

## 2024-06-18 PROCEDURE — G2211 COMPLEX E/M VISIT ADD ON: CPT | Mod: NC

## 2024-06-18 PROCEDURE — 99214 OFFICE O/P EST MOD 30 MIN: CPT

## 2024-06-18 RX ORDER — BLOOD PRESSURE TEST KIT-MEDIUM
KIT MISCELLANEOUS
Qty: 1 | Refills: 0 | Status: ACTIVE | COMMUNITY
Start: 2024-06-18 | End: 1900-01-01

## 2024-06-22 PROBLEM — F41.9 ANXIETY AND DEPRESSION: Status: ACTIVE | Noted: 2024-04-17

## 2024-06-22 PROBLEM — I25.10 3-VESSEL CAD: Status: ACTIVE | Noted: 2023-04-14

## 2024-06-22 NOTE — ASSESSMENT
[FreeTextEntry1] : Depression  Anxiety -c/w lexapro 10mg qd -patient contracts for safety at this time; given number for [facility] crisis center should that change -referral given for therapist and psychiatrist   CAD S/p CABG 4/2023  -c/w asa, plavix, statin and BB -follows with cardio Dr. Shipman  DM1 -c/w basaglar 25u qam and 12u qhs -c/w admelog sliding scale -follows with endo Dr. Infante -follows ophthalmology Dr. López  HTN -bp elevated but improved on repeat -enalapril increased to 30mg qd last week by cardiology  -lifestyle modifications including limiting alcohol/dietary sodium and increasing physical activity -discussed parameters of normal bp -advised to check bps at home and call/return if high or low  Hashimoto's -c/w synthroid  Urinary symptoms -previously discussed symptoms resolved   RTC in 3 months

## 2024-06-22 NOTE — HISTORY OF PRESENT ILLNESS
[FreeTextEntry1] : follow up [de-identified] : GORDON BLANCO is a 54 year F who presents for follow up PMHx DM1 with neuropathy, CAD s/p stents -> 3-vessel CABG 4/2023, Hashimoto's thyroiditis, HLD, HTN, obesity, Vit D deficiency  Feels well overall Reports some improvement in her mood after starting lexapro but still has episodes of feeling "bleh." She was taking lexapro 5mg qd but increased to 10mg last week. Denies SI/HI. States she called cardiology last week for elevated BP and was advised to increase enalapril to 30mg qd   Cardio Dr. Shipman Endo Dr. Infante

## 2024-06-26 ENCOUNTER — APPOINTMENT (OUTPATIENT)
Dept: ENDOCRINOLOGY | Facility: CLINIC | Age: 54
End: 2024-06-26
Payer: MEDICAID

## 2024-06-26 VITALS
HEIGHT: 65 IN | BODY MASS INDEX: 31.22 KG/M2 | HEART RATE: 67 BPM | SYSTOLIC BLOOD PRESSURE: 130 MMHG | DIASTOLIC BLOOD PRESSURE: 90 MMHG | TEMPERATURE: 99.1 F | WEIGHT: 187.38 LBS | OXYGEN SATURATION: 96 %

## 2024-06-26 VITALS — SYSTOLIC BLOOD PRESSURE: 126 MMHG | DIASTOLIC BLOOD PRESSURE: 76 MMHG

## 2024-06-26 DIAGNOSIS — E06.3 AUTOIMMUNE THYROIDITIS: ICD-10-CM

## 2024-06-26 DIAGNOSIS — E10.9 TYPE 1 DIABETES MELLITUS W/OUT COMPLICATIONS: ICD-10-CM

## 2024-06-26 DIAGNOSIS — E03.9 HYPOTHYROIDISM, UNSPECIFIED: ICD-10-CM

## 2024-06-26 DIAGNOSIS — E66.9 OBESITY, UNSPECIFIED: ICD-10-CM

## 2024-06-26 DIAGNOSIS — I10 ESSENTIAL (PRIMARY) HYPERTENSION: ICD-10-CM

## 2024-06-26 DIAGNOSIS — E78.00 PURE HYPERCHOLESTEROLEMIA, UNSPECIFIED: ICD-10-CM

## 2024-06-26 DIAGNOSIS — E55.9 VITAMIN D DEFICIENCY, UNSPECIFIED: ICD-10-CM

## 2024-06-26 LAB
GLUCOSE BLDC GLUCOMTR-MCNC: 153
HBA1C MFR BLD HPLC: 7.6

## 2024-06-26 PROCEDURE — 95251 CONT GLUC MNTR ANALYSIS I&R: CPT

## 2024-06-26 PROCEDURE — 83036 HEMOGLOBIN GLYCOSYLATED A1C: CPT | Mod: QW

## 2024-06-26 PROCEDURE — 99214 OFFICE O/P EST MOD 30 MIN: CPT | Mod: 25

## 2024-06-27 LAB
BASOPHILS # BLD AUTO: 0.02 K/UL
BASOPHILS NFR BLD AUTO: 0.2 %
CREAT SPEC-SCNC: 40 MG/DL
EOSINOPHIL # BLD AUTO: 0.09 K/UL
EOSINOPHIL NFR BLD AUTO: 1 %
ESTIMATED AVERAGE GLUCOSE: 174 MG/DL
GLYCOMARK.: 5.8 UG/ML
HBA1C MFR BLD HPLC: 7.7 %
HCT VFR BLD CALC: 41.9 %
HGB BLD-MCNC: 13.7 G/DL
IMM GRANULOCYTES NFR BLD AUTO: 0.1 %
IRON SERPL-MCNC: 112 UG/DL
LYMPHOCYTES # BLD AUTO: 1.99 K/UL
LYMPHOCYTES NFR BLD AUTO: 22.2 %
MAN DIFF?: NORMAL
MCHC RBC-ENTMCNC: 32.2 PG
MCHC RBC-ENTMCNC: 32.7 GM/DL
MCV RBC AUTO: 98.4 FL
MICROALBUMIN 24H UR DL<=1MG/L-MCNC: <1.2 MG/DL
MICROALBUMIN/CREAT 24H UR-RTO: NORMAL MG/G
MONOCYTES # BLD AUTO: 0.75 K/UL
MONOCYTES NFR BLD AUTO: 8.4 %
NEUTROPHILS # BLD AUTO: 6.09 K/UL
NEUTROPHILS NFR BLD AUTO: 68.1 %
PLATELET # BLD AUTO: 231 K/UL
RBC # BLD: 4.26 M/UL
RBC # FLD: 12.7 %
T3FREE SERPL-MCNC: 2.59 PG/ML
T4 FREE SERPL-MCNC: 1.7 NG/DL
TSH SERPL-ACNC: 0.04 UIU/ML
WBC # FLD AUTO: 8.95 K/UL

## 2024-08-20 ENCOUNTER — NON-APPOINTMENT (OUTPATIENT)
Age: 54
End: 2024-08-20

## 2024-08-22 ENCOUNTER — NON-APPOINTMENT (OUTPATIENT)
Age: 54
End: 2024-08-22

## 2024-08-22 ENCOUNTER — APPOINTMENT (OUTPATIENT)
Dept: CARDIOLOGY | Facility: CLINIC | Age: 54
End: 2024-08-22
Payer: MEDICAID

## 2024-08-22 VITALS
WEIGHT: 182 LBS | OXYGEN SATURATION: 98 % | SYSTOLIC BLOOD PRESSURE: 151 MMHG | DIASTOLIC BLOOD PRESSURE: 86 MMHG | BODY MASS INDEX: 30.32 KG/M2 | HEART RATE: 68 BPM | HEIGHT: 65 IN

## 2024-08-22 DIAGNOSIS — I10 ESSENTIAL (PRIMARY) HYPERTENSION: ICD-10-CM

## 2024-08-22 DIAGNOSIS — I25.10 ATHEROSCLEROTIC HEART DISEASE OF NATIVE CORONARY ARTERY W/OUT ANGINA PECTORIS: ICD-10-CM

## 2024-08-22 PROCEDURE — G2211 COMPLEX E/M VISIT ADD ON: CPT | Mod: NC

## 2024-08-22 PROCEDURE — 99214 OFFICE O/P EST MOD 30 MIN: CPT | Mod: 25

## 2024-08-22 PROCEDURE — 93000 ELECTROCARDIOGRAM COMPLETE: CPT

## 2024-08-22 NOTE — DISCUSSION/SUMMARY
[FreeTextEntry1] : CAD s/p CABG  No ischemic symptoms.  HTN will increase enalapril to 40 mg.  If BP continues to be high will restart amlodipine [EKG obtained to assist in diagnosis and management of assessed problem(s)] : EKG obtained to assist in diagnosis and management of assessed problem(s)

## 2024-08-22 NOTE — PHYSICAL EXAM
[Well Developed] : well developed [Well Nourished] : well nourished [No Acute Distress] : no acute distress [Normal Conjunctiva] : normal conjunctiva [Normal Venous Pressure] : normal venous pressure [No Carotid Bruit] : no carotid bruit [Normal S1, S2] : normal S1, S2 [No Murmur] : no murmur [No Rub] : no rub [No Gallop] : no gallop [Good Air Entry] : good air entry [No Respiratory Distress] : no respiratory distress  [Soft] : abdomen soft [Non Tender] : non-tender [No Masses/organomegaly] : no masses/organomegaly [Normal Bowel Sounds] : normal bowel sounds [Normal Gait] : normal gait [No Edema] : no edema [No Cyanosis] : no cyanosis [No Clubbing] : no clubbing [No Varicosities] : no varicosities [No Rash] : no rash [No Skin Lesions] : no skin lesions [Moves all extremities] : moves all extremities [No Focal Deficits] : no focal deficits [Normal Speech] : normal speech [Alert and Oriented] : alert and oriented [Normal memory] : normal memory [de-identified] : diminished BS on the left

## 2024-08-22 NOTE — HISTORY OF PRESENT ILLNESS
[FreeTextEntry1] : 54 year old woman with Type 1 DM and early onset CAD.  S/P multivessel CABG.  She has had no angina or dyspnea.  BP has been going up.

## 2024-09-18 ENCOUNTER — APPOINTMENT (OUTPATIENT)
Dept: INTERNAL MEDICINE | Facility: CLINIC | Age: 54
End: 2024-09-18

## 2024-10-24 ENCOUNTER — APPOINTMENT (OUTPATIENT)
Dept: CARDIOLOGY | Facility: CLINIC | Age: 54
End: 2024-10-24
Payer: MEDICAID

## 2024-10-24 ENCOUNTER — NON-APPOINTMENT (OUTPATIENT)
Age: 54
End: 2024-10-24

## 2024-10-24 VITALS
DIASTOLIC BLOOD PRESSURE: 81 MMHG | BODY MASS INDEX: 30.29 KG/M2 | SYSTOLIC BLOOD PRESSURE: 153 MMHG | HEART RATE: 74 BPM | WEIGHT: 182 LBS | OXYGEN SATURATION: 98 %

## 2024-10-24 DIAGNOSIS — I10 ESSENTIAL (PRIMARY) HYPERTENSION: ICD-10-CM

## 2024-10-24 DIAGNOSIS — I25.10 ATHEROSCLEROTIC HEART DISEASE OF NATIVE CORONARY ARTERY W/OUT ANGINA PECTORIS: ICD-10-CM

## 2024-10-24 PROCEDURE — 99213 OFFICE O/P EST LOW 20 MIN: CPT | Mod: 25

## 2024-10-24 PROCEDURE — 93000 ELECTROCARDIOGRAM COMPLETE: CPT

## 2024-11-20 ENCOUNTER — APPOINTMENT (OUTPATIENT)
Dept: ENDOCRINOLOGY | Facility: CLINIC | Age: 54
End: 2024-11-20
Payer: MEDICAID

## 2024-11-20 VITALS
WEIGHT: 184.13 LBS | OXYGEN SATURATION: 99 % | HEIGHT: 65 IN | SYSTOLIC BLOOD PRESSURE: 124 MMHG | DIASTOLIC BLOOD PRESSURE: 82 MMHG | HEART RATE: 74 BPM | BODY MASS INDEX: 30.68 KG/M2

## 2024-11-20 DIAGNOSIS — E06.3 AUTOIMMUNE THYROIDITIS: ICD-10-CM

## 2024-11-20 DIAGNOSIS — E53.8 DEFICIENCY OF OTHER SPECIFIED B GROUP VITAMINS: ICD-10-CM

## 2024-11-20 DIAGNOSIS — E78.00 PURE HYPERCHOLESTEROLEMIA, UNSPECIFIED: ICD-10-CM

## 2024-11-20 DIAGNOSIS — I10 ESSENTIAL (PRIMARY) HYPERTENSION: ICD-10-CM

## 2024-11-20 DIAGNOSIS — E55.9 VITAMIN D DEFICIENCY, UNSPECIFIED: ICD-10-CM

## 2024-11-20 DIAGNOSIS — E66.9 OBESITY, UNSPECIFIED: ICD-10-CM

## 2024-11-20 DIAGNOSIS — E10.9 TYPE 1 DIABETES MELLITUS W/OUT COMPLICATIONS: ICD-10-CM

## 2024-11-20 PROCEDURE — 99214 OFFICE O/P EST MOD 30 MIN: CPT | Mod: 25

## 2024-11-20 PROCEDURE — 95251 CONT GLUC MNTR ANALYSIS I&R: CPT

## 2024-11-21 DIAGNOSIS — E03.9 HYPOTHYROIDISM, UNSPECIFIED: ICD-10-CM

## 2024-11-21 DIAGNOSIS — E53.8 DEFICIENCY OF OTHER SPECIFIED B GROUP VITAMINS: ICD-10-CM

## 2024-11-21 LAB
25(OH)D3 SERPL-MCNC: 40.3 NG/ML
ALBUMIN SERPL ELPH-MCNC: 4.3 G/DL
ALP BLD-CCNC: 129 U/L
ALT SERPL-CCNC: 28 U/L
ANION GAP SERPL CALC-SCNC: 12 MMOL/L
AST SERPL-CCNC: 23 U/L
BILIRUB SERPL-MCNC: 0.3 MG/DL
BUN SERPL-MCNC: 14 MG/DL
C PEPTIDE SERPL-MCNC: <0.1 NG/ML
CALCIUM SERPL-MCNC: 9.7 MG/DL
CHLORIDE SERPL-SCNC: 101 MMOL/L
CHOLEST SERPL-MCNC: 166 MG/DL
CO2 SERPL-SCNC: 26 MMOL/L
CREAT SERPL-MCNC: 0.53 MG/DL
CREAT SPEC-SCNC: 74 MG/DL
EGFR: 109 ML/MIN/1.73M2
ESTIMATED AVERAGE GLUCOSE: 166 MG/DL
FOLATE SERPL-MCNC: 2.7 NG/ML
FRUCTOSAMINE SERPL-MCNC: 242 UMOL/L
GLUCOSE BLDC GLUCOMTR-MCNC: 102
GLUCOSE SERPL-MCNC: 58 MG/DL
GLYCOMARK.: 5 UG/ML
HBA1C MFR BLD HPLC: 7.4 %
HBA1C MFR BLD HPLC: 7.6
HDLC SERPL-MCNC: 48 MG/DL
LDLC SERPL CALC-MCNC: 91 MG/DL
LDLC SERPL DIRECT ASSAY-MCNC: 90 MG/DL
MAGNESIUM SERPL-MCNC: 2.1 MG/DL
MICROALBUMIN 24H UR DL<=1MG/L-MCNC: 2.1 MG/DL
MICROALBUMIN/CREAT 24H UR-RTO: 28 MG/G
NONHDLC SERPL-MCNC: 118 MG/DL
POTASSIUM SERPL-SCNC: 4.5 MMOL/L
PROT SERPL-MCNC: 6.7 G/DL
SODIUM SERPL-SCNC: 140 MMOL/L
T3FREE SERPL-MCNC: 2.33 PG/ML
T4 FREE SERPL-MCNC: 1.6 NG/DL
TRIGL SERPL-MCNC: 156 MG/DL
TSH SERPL-ACNC: 0.05 UIU/ML
VIT B12 SERPL-MCNC: 1092 PG/ML

## 2024-11-21 RX ORDER — LEVOTHYROXINE SODIUM 0.12 MG/1
125 TABLET ORAL DAILY
Qty: 90 | Refills: 2 | Status: ACTIVE | COMMUNITY
Start: 2024-11-21 | End: 1900-01-01

## 2024-11-21 RX ORDER — FOLIC ACID 1 MG/1
1 TABLET ORAL DAILY
Qty: 90 | Refills: 1 | Status: ACTIVE | COMMUNITY
Start: 2024-11-21 | End: 1900-01-01

## 2024-12-10 RX ORDER — BLOOD-GLUCOSE SENSOR
EACH MISCELLANEOUS
Qty: 9 | Refills: 3 | Status: ACTIVE | COMMUNITY
Start: 2024-12-10 | End: 1900-01-01

## 2024-12-10 RX ORDER — INSULIN PMP CART,AUT,G6/7,CNTR
EACH SUBCUTANEOUS
Qty: 9 | Refills: 3 | Status: ACTIVE | COMMUNITY
Start: 2024-12-10 | End: 1900-01-01

## 2024-12-10 RX ORDER — INSULIN PMP CART,AUT,G6/7,CNTR
EACH SUBCUTANEOUS
Qty: 1 | Refills: 0 | Status: ACTIVE | COMMUNITY
Start: 2024-12-10 | End: 1900-01-01

## 2024-12-19 ENCOUNTER — NON-APPOINTMENT (OUTPATIENT)
Age: 54
End: 2024-12-19

## 2024-12-23 RX ORDER — INSULIN ASPART 100 [IU]/ML
100 INJECTION, SOLUTION INTRAVENOUS; SUBCUTANEOUS
Qty: 9 | Refills: 3 | Status: ACTIVE | COMMUNITY
Start: 2024-12-23 | End: 1900-01-01

## 2024-12-24 RX ORDER — INSULIN LISPRO 100 [IU]/ML
100 INJECTION, SOLUTION INTRAVENOUS; SUBCUTANEOUS
Qty: 9 | Refills: 3 | Status: DISCONTINUED | COMMUNITY
Start: 2024-12-19 | End: 2024-12-24

## 2025-01-10 NOTE — ED ADULT NURSE REASSESSMENT NOTE - TEMPLATE LIST FOR HEAD TO TOE ASSESSMENT
Fluid/Electrolyte/Metabolic Gen: Crying on exam, not consolable  HEENT: Normocephalic, atraumatic, moist mucous membranes, oropharynx clear, no conjunctival injection  Heart: Regular rate and rhythm, no murmur  Lungs: coarse breath sounds bilaterally  Abd: soft, non-tender, non-distended  Ext: No peripheral edema, peripheral pulses 2+ bilaterally, capillary refill <2 seconds  Neuro: awake, alert, appropriately reactive. EOMI, Facial  expression symmetric. Strength normal in bilateral upper and lower extremities. Sensation grossly intact. Reach and grasp coordination normal without dysmetria.  Skin: warm, well perfused, no rashes visible

## 2025-03-03 ENCOUNTER — RX RENEWAL (OUTPATIENT)
Age: 55
End: 2025-03-03

## 2025-04-04 ENCOUNTER — NON-APPOINTMENT (OUTPATIENT)
Age: 55
End: 2025-04-04

## 2025-04-04 RX ORDER — VITAMIN K2 90 MCG
1000 CAPSULE ORAL
Qty: 90 | Refills: 1 | Status: ACTIVE | COMMUNITY
Start: 2025-04-04 | End: 1900-01-01

## 2025-04-23 ENCOUNTER — NON-APPOINTMENT (OUTPATIENT)
Age: 55
End: 2025-04-23

## 2025-04-25 ENCOUNTER — NON-APPOINTMENT (OUTPATIENT)
Age: 55
End: 2025-04-25

## 2025-05-15 NOTE — ED ADULT NURSE NOTE - NSFALLRSKASSESSTYPE_ED_ALL_ED
Ethmoid sinusitis  Doxycycine 100 mg twice daily for 10 days, take with food,   no dairy for 2 hours before or after taking medication,   should eat yogurt daily at lunch.    Take long acting antihistamine daily like Claritin or Zyrtec  NO DECONGESTANTS   Mucinex 12 hour expectorant - guaifenesin - to thin mucus    Initial (On Arrival)

## 2025-06-03 ENCOUNTER — APPOINTMENT (OUTPATIENT)
Dept: ENDOCRINOLOGY | Facility: CLINIC | Age: 55
End: 2025-06-03
Payer: MEDICAID

## 2025-06-03 VITALS
HEART RATE: 81 BPM | WEIGHT: 182.31 LBS | DIASTOLIC BLOOD PRESSURE: 80 MMHG | OXYGEN SATURATION: 97 % | BODY MASS INDEX: 30.37 KG/M2 | SYSTOLIC BLOOD PRESSURE: 120 MMHG | HEIGHT: 65 IN | TEMPERATURE: 98.3 F

## 2025-06-03 DIAGNOSIS — I10 ESSENTIAL (PRIMARY) HYPERTENSION: ICD-10-CM

## 2025-06-03 DIAGNOSIS — E06.3 AUTOIMMUNE THYROIDITIS: ICD-10-CM

## 2025-06-03 DIAGNOSIS — E78.00 PURE HYPERCHOLESTEROLEMIA, UNSPECIFIED: ICD-10-CM

## 2025-06-03 DIAGNOSIS — E10.9 TYPE 1 DIABETES MELLITUS W/OUT COMPLICATIONS: ICD-10-CM

## 2025-06-03 DIAGNOSIS — E55.9 VITAMIN D DEFICIENCY, UNSPECIFIED: ICD-10-CM

## 2025-06-03 DIAGNOSIS — E53.8 DEFICIENCY OF OTHER SPECIFIED B GROUP VITAMINS: ICD-10-CM

## 2025-06-03 DIAGNOSIS — E66.9 OBESITY, UNSPECIFIED: ICD-10-CM

## 2025-06-03 DIAGNOSIS — E03.9 HYPOTHYROIDISM, UNSPECIFIED: ICD-10-CM

## 2025-06-03 LAB — GLUCOSE BLDC GLUCOMTR-MCNC: 260

## 2025-06-03 PROCEDURE — 99214 OFFICE O/P EST MOD 30 MIN: CPT | Mod: 25

## 2025-06-03 PROCEDURE — 95251 CONT GLUC MNTR ANALYSIS I&R: CPT

## 2025-06-04 LAB
25(OH)D3 SERPL-MCNC: 36.4 NG/ML
ALBUMIN SERPL ELPH-MCNC: 4.4 G/DL
ALP BLD-CCNC: 152 U/L
ALT SERPL-CCNC: 57 U/L
ANION GAP SERPL CALC-SCNC: 15 MMOL/L
AST SERPL-CCNC: 49 U/L
BASOPHILS # BLD AUTO: 0.03 K/UL
BASOPHILS NFR BLD AUTO: 0.3 %
BILIRUB SERPL-MCNC: 0.3 MG/DL
BUN SERPL-MCNC: 10 MG/DL
CALCIUM SERPL-MCNC: 9.7 MG/DL
CHLORIDE SERPL-SCNC: 100 MMOL/L
CHOLEST SERPL-MCNC: 161 MG/DL
CO2 SERPL-SCNC: 23 MMOL/L
CREAT SERPL-MCNC: 0.48 MG/DL
CREAT SPEC-SCNC: 148 MG/DL
EGFRCR SERPLBLD CKD-EPI 2021: 111 ML/MIN/1.73M2
EOSINOPHIL # BLD AUTO: 0.12 K/UL
EOSINOPHIL NFR BLD AUTO: 1.2 %
ESTIMATED AVERAGE GLUCOSE: 174 MG/DL
FRUCTOSAMINE SERPL-MCNC: 254 UMOL/L
GLUCOSE SERPL-MCNC: 162 MG/DL
GLYCOMARK.: 7.7 UG/ML
HBA1C MFR BLD HPLC: 7.7 %
HCT VFR BLD CALC: 43.6 %
HDLC SERPL-MCNC: 41 MG/DL
HGB BLD-MCNC: 14.1 G/DL
IMM GRANULOCYTES NFR BLD AUTO: 0.4 %
LDLC SERPL DIRECT ASSAY-MCNC: 76 MG/DL
LYMPHOCYTES # BLD AUTO: 2.23 K/UL
LYMPHOCYTES NFR BLD AUTO: 21.4 %
MAGNESIUM SERPL-MCNC: 2 MG/DL
MAN DIFF?: NORMAL
MCHC RBC-ENTMCNC: 32.3 G/DL
MCHC RBC-ENTMCNC: 32.6 PG
MCV RBC AUTO: 100.9 FL
MICROALBUMIN 24H UR DL<=1MG/L-MCNC: 2 MG/DL
MICROALBUMIN/CREAT 24H UR-RTO: 14 MG/G
MONOCYTES # BLD AUTO: 0.78 K/UL
MONOCYTES NFR BLD AUTO: 7.5 %
NEUTROPHILS # BLD AUTO: 7.21 K/UL
NEUTROPHILS NFR BLD AUTO: 69.2 %
PLATELET # BLD AUTO: 221 K/UL
POTASSIUM SERPL-SCNC: 4.4 MMOL/L
PROT SERPL-MCNC: 6.6 G/DL
RBC # BLD: 4.32 M/UL
RBC # FLD: 12.4 %
SODIUM SERPL-SCNC: 138 MMOL/L
T3FREE SERPL-MCNC: 2.47 PG/ML
T4 FREE SERPL-MCNC: 1.4 NG/DL
TRIGL SERPL-MCNC: 223 MG/DL
TSH SERPL-ACNC: 0.12 UIU/ML
VIT B12 SERPL-MCNC: 1127 PG/ML
WBC # FLD AUTO: 10.41 K/UL

## 2025-06-06 LAB — ZINC SERPL-MCNC: 120 UG/DL

## 2025-06-18 NOTE — ED PROVIDER NOTE - RATE
PA Initiation    Medication: OTEZLA 30 MG PO TABS  Insurance Company: [employer] ([facility]) - Phone [phone] Fax [phone]  Pharmacy Filling the Rx:    Filling Pharmacy Phone:    Filling Pharmacy Fax:    Start Date: 6/17/2025      Key: [id number]  
Prior Authorization Approval    Medication: OTEZLA 30 MG PO TABS  Authorization Effective Date: 6/17/2025  Authorization Expiration Date: 6/17/2026  Approved Dose/Quantity: 60 for 30 days  Reference #: Key: [id number]   Insurance Company: [employer] ([facility]) - Phone [phone] Fax [phone]  Expected CoPay: $    CoPay Card Available: No    Financial Assistance Needed: no  Which Pharmacy is filling the prescription: [facility] - [place], [place] - [address]  Pharmacy Notified: yes  Patient Notified: not needed-renewal      
77

## 2025-08-28 ENCOUNTER — APPOINTMENT (OUTPATIENT)
Dept: CARDIOLOGY | Facility: CLINIC | Age: 55
End: 2025-08-28
Payer: MEDICAID

## 2025-08-28 VITALS
BODY MASS INDEX: 29.29 KG/M2 | HEART RATE: 75 BPM | WEIGHT: 176 LBS | DIASTOLIC BLOOD PRESSURE: 79 MMHG | SYSTOLIC BLOOD PRESSURE: 137 MMHG | OXYGEN SATURATION: 100 %

## 2025-08-28 DIAGNOSIS — E78.00 PURE HYPERCHOLESTEROLEMIA, UNSPECIFIED: ICD-10-CM

## 2025-08-28 DIAGNOSIS — I25.10 ATHEROSCLEROTIC HEART DISEASE OF NATIVE CORONARY ARTERY W/OUT ANGINA PECTORIS: ICD-10-CM

## 2025-08-28 DIAGNOSIS — Z95.1 PRESENCE OF AORTOCORONARY BYPASS GRAFT: ICD-10-CM

## 2025-08-28 PROCEDURE — 93000 ELECTROCARDIOGRAM COMPLETE: CPT

## 2025-08-28 PROCEDURE — 99214 OFFICE O/P EST MOD 30 MIN: CPT | Mod: 25

## 2025-08-28 RX ORDER — EZETIMIBE 10 MG/1
10 TABLET ORAL
Qty: 90 | Refills: 3 | Status: ACTIVE | COMMUNITY
Start: 2025-08-28 | End: 1900-01-01

## 2025-09-09 ENCOUNTER — APPOINTMENT (OUTPATIENT)
Dept: ENDOCRINOLOGY | Facility: CLINIC | Age: 55
End: 2025-09-09
Payer: MEDICAID

## 2025-09-09 VITALS
HEART RATE: 71 BPM | SYSTOLIC BLOOD PRESSURE: 134 MMHG | WEIGHT: 174.13 LBS | DIASTOLIC BLOOD PRESSURE: 82 MMHG | OXYGEN SATURATION: 98 % | BODY MASS INDEX: 29.01 KG/M2 | HEIGHT: 65 IN

## 2025-09-09 DIAGNOSIS — E06.3 AUTOIMMUNE THYROIDITIS: ICD-10-CM

## 2025-09-09 DIAGNOSIS — E53.8 DEFICIENCY OF OTHER SPECIFIED B GROUP VITAMINS: ICD-10-CM

## 2025-09-09 DIAGNOSIS — E55.9 VITAMIN D DEFICIENCY, UNSPECIFIED: ICD-10-CM

## 2025-09-09 LAB
GLUCOSE BLDC GLUCOMTR-MCNC: 251
HBA1C MFR BLD HPLC: 7.8

## 2025-09-09 PROCEDURE — 83036 HEMOGLOBIN GLYCOSYLATED A1C: CPT | Mod: QW

## 2025-09-09 PROCEDURE — 95251 CONT GLUC MNTR ANALYSIS I&R: CPT

## 2025-09-09 PROCEDURE — 99214 OFFICE O/P EST MOD 30 MIN: CPT | Mod: 25

## 2025-09-10 ENCOUNTER — NON-APPOINTMENT (OUTPATIENT)
Age: 55
End: 2025-09-10

## 2025-09-10 LAB
ALBUMIN SERPL ELPH-MCNC: 4.3 G/DL
ALBUMIN, RANDOM URINE: 1.7 MG/DL
ALP BLD-CCNC: 130 U/L
ALT SERPL-CCNC: 29 U/L
ANION GAP SERPL CALC-SCNC: 13 MMOL/L
AST SERPL-CCNC: 28 U/L
BILIRUB SERPL-MCNC: 0.4 MG/DL
BUN SERPL-MCNC: 9 MG/DL
CALCIUM SERPL-MCNC: 9.4 MG/DL
CHLORIDE SERPL-SCNC: 99 MMOL/L
CO2 SERPL-SCNC: 24 MMOL/L
CREAT SERPL-MCNC: 0.45 MG/DL
CREAT SPEC-SCNC: 71 MG/DL
EGFRCR SERPLBLD CKD-EPI 2021: 113 ML/MIN/1.73M2
GGT SERPL-CCNC: 82 U/L
GLUCOSE SERPL-MCNC: 196 MG/DL
MICROALBUMIN/CREAT 24H UR-RTO: 24 MG/G
POTASSIUM SERPL-SCNC: 4.8 MMOL/L
PROT SERPL-MCNC: 6.6 G/DL
SODIUM SERPL-SCNC: 136 MMOL/L
T3FREE SERPL-MCNC: 2.43 PG/ML
T4 FREE SERPL-MCNC: 1.5 NG/DL
TSH SERPL-ACNC: 0.19 UIU/ML

## 2025-09-10 RX ORDER — TIRZEPATIDE 12.5 MG/.5ML
12.5 INJECTION, SOLUTION SUBCUTANEOUS
Qty: 3 | Refills: 1 | Status: ACTIVE | COMMUNITY
Start: 2025-09-10 | End: 1900-01-01

## 2025-09-12 RX ORDER — LEVOTHYROXINE SODIUM 0.1 MG/1
100 TABLET ORAL
Qty: 90 | Refills: 1 | Status: ACTIVE | COMMUNITY
Start: 2025-09-12 | End: 1900-01-01

## 2025-09-16 ENCOUNTER — APPOINTMENT (OUTPATIENT)
Dept: INTERNAL MEDICINE | Facility: CLINIC | Age: 55
End: 2025-09-16
Payer: MEDICAID

## 2025-09-16 VITALS
TEMPERATURE: 98.3 F | SYSTOLIC BLOOD PRESSURE: 137 MMHG | HEIGHT: 65 IN | RESPIRATION RATE: 15 BRPM | HEART RATE: 70 BPM | DIASTOLIC BLOOD PRESSURE: 78 MMHG | OXYGEN SATURATION: 98 % | WEIGHT: 174 LBS | BODY MASS INDEX: 28.99 KG/M2

## 2025-09-16 DIAGNOSIS — E66.9 OBESITY, UNSPECIFIED: ICD-10-CM

## 2025-09-16 DIAGNOSIS — E03.9 HYPOTHYROIDISM, UNSPECIFIED: ICD-10-CM

## 2025-09-16 DIAGNOSIS — L02.224 FURUNCLE OF GROIN: ICD-10-CM

## 2025-09-16 DIAGNOSIS — I10 ESSENTIAL (PRIMARY) HYPERTENSION: ICD-10-CM

## 2025-09-16 DIAGNOSIS — F32.A ANXIETY DISORDER, UNSPECIFIED: ICD-10-CM

## 2025-09-16 DIAGNOSIS — Z00.00 ENCOUNTER FOR GENERAL ADULT MEDICAL EXAMINATION W/OUT ABNORMAL FINDINGS: ICD-10-CM

## 2025-09-16 DIAGNOSIS — E78.00 PURE HYPERCHOLESTEROLEMIA, UNSPECIFIED: ICD-10-CM

## 2025-09-16 DIAGNOSIS — I25.10 ATHEROSCLEROTIC HEART DISEASE OF NATIVE CORONARY ARTERY W/OUT ANGINA PECTORIS: ICD-10-CM

## 2025-09-16 DIAGNOSIS — Z12.11 ENCOUNTER FOR SCREENING FOR MALIGNANT NEOPLASM OF COLON: ICD-10-CM

## 2025-09-16 DIAGNOSIS — R74.8 ABNORMAL LEVELS OF OTHER SERUM ENZYMES: ICD-10-CM

## 2025-09-16 DIAGNOSIS — E10.9 TYPE 1 DIABETES MELLITUS W/OUT COMPLICATIONS: ICD-10-CM

## 2025-09-16 DIAGNOSIS — F17.200 NICOTINE DEPENDENCE, UNSPECIFIED, UNCOMPLICATED: ICD-10-CM

## 2025-09-16 DIAGNOSIS — F41.9 ANXIETY DISORDER, UNSPECIFIED: ICD-10-CM

## 2025-09-16 DIAGNOSIS — Z12.39 ENCOUNTER FOR OTHER SCREENING FOR MALIGNANT NEOPLASM OF BREAST: ICD-10-CM

## 2025-09-16 PROCEDURE — 99406 BEHAV CHNG SMOKING 3-10 MIN: CPT

## 2025-09-16 PROCEDURE — 99214 OFFICE O/P EST MOD 30 MIN: CPT | Mod: 25

## 2025-09-16 PROCEDURE — G0296 VISIT TO DETERM LDCT ELIG: CPT

## 2025-09-16 PROCEDURE — 99396 PREV VISIT EST AGE 40-64: CPT | Mod: 25

## 2025-09-16 RX ORDER — BLOOD-GLUCOSE METER
W/DEVICE KIT MISCELLANEOUS
Qty: 1 | Refills: 0 | Status: DISCONTINUED | COMMUNITY
Start: 2025-09-10 | End: 2025-09-16

## 2025-09-16 RX ORDER — SULFAMETHOXAZOLE AND TRIMETHOPRIM 800; 160 MG/1; MG/1
800-160 TABLET ORAL TWICE DAILY
Qty: 10 | Refills: 0 | Status: ACTIVE | COMMUNITY
Start: 2025-09-16 | End: 1900-01-01

## 2025-09-16 RX ORDER — LANCETS
EACH MISCELLANEOUS
Qty: 2 | Refills: 3 | Status: DISCONTINUED | COMMUNITY
Start: 2025-09-10 | End: 2025-09-16

## 2025-09-16 RX ORDER — BLOOD SUGAR DIAGNOSTIC
STRIP MISCELLANEOUS TWICE DAILY
Qty: 1 | Refills: 1 | Status: DISCONTINUED | COMMUNITY
Start: 2025-09-10 | End: 2025-09-16

## 2025-09-17 ENCOUNTER — NON-APPOINTMENT (OUTPATIENT)
Age: 55
End: 2025-09-17

## (undated) DEVICE — SUT ETHIBOND 1 30" CT-1

## (undated) DEVICE — SAW BLADE MICROAIRE OSCILLATING LG 0.9X64X33.5MM

## (undated) DEVICE — SOL IRR POUR NS 0.9% 500ML

## (undated) DEVICE — STAPLER SKIN VISI-STAT 35 WIDE

## (undated) DEVICE — CONNECTOR STRAIGHT 3/8 X 3/8"

## (undated) DEVICE — TUBING IV SET MICROCLAVE ADAPTER

## (undated) DEVICE — SUT VICRYL 3-0 27" CT-1

## (undated) DEVICE — DRAPE IOBAN 33" X 23"

## (undated) DEVICE — SYNOVIS VASCULAR PROBE 1.5MM 15CM

## (undated) DEVICE — CONNECTOR "Y" 3/8 X 1/4 X 3/8"

## (undated) DEVICE — SUT SOFSILK 0 30" V-20

## (undated) DEVICE — GOWN LG

## (undated) DEVICE — SUT MONOCRYL 4-0 18" PS-2

## (undated) DEVICE — SUT SOFSILK 0 18" TIES

## (undated) DEVICE — SYR TB 1CC 25G X 5/8 (BLUE)

## (undated) DEVICE — SYR LUER LOK 50CC

## (undated) DEVICE — PREP CHLORAPREP HI-LITE ORANGE 26ML

## (undated) DEVICE — STOPCOCK 4-WAY (BLUE) DISCOFIX SPIN-LOCK CONNECTOR

## (undated) DEVICE — FEEDING TUBE NG SUMP 16FR 48"

## (undated) DEVICE — DRSG STERISTRIPS 0.5 X 4"

## (undated) DEVICE — DRSG TEGADERM 6"X8"

## (undated) DEVICE — LAP PAD 18 X 18"

## (undated) DEVICE — PACING CABLE (BROWN) A/V TEMP SCREW DOWN 12FT

## (undated) DEVICE — SOL IRR BAG NS 0.9% 3000ML

## (undated) DEVICE — WARMING BLANKET FULL ADULT

## (undated) DEVICE — GLV 8 PROTEXIS (WHITE)

## (undated) DEVICE — CANISTER SUCTION 2000CC

## (undated) DEVICE — CONNECTOR STRAIGHT 3/8 X 1/2"

## (undated) DEVICE — GETINGE VASOVIEW 7 ENDOSCOPIC VESSEL HARVESTING SYSTEM

## (undated) DEVICE — SUMP PERICARDIAL 20FR 1/4" ADULT

## (undated) DEVICE — MEDTRONIC CLEARVIEW BLOWER MISTER KIT W TUBING SET

## (undated) DEVICE — GLV 7.5 PROTEXIS (WHITE)

## (undated) DEVICE — SUT SILK 0 30" TIES

## (undated) DEVICE — SUT PROLENE 7-0 24" BV-1

## (undated) DEVICE — Device

## (undated) DEVICE — DRAPE 1/2 SHEET 40X57"

## (undated) DEVICE — FOLEY TRAY 16FR 5CC LF LUBRISIL ADVANCE TEMP CLOSED

## (undated) DEVICE — GLV 6.5 PROTEXIS (WHITE)

## (undated) DEVICE — ELCTR BOVIE TIP CLEANER SCRATCH PAD

## (undated) DEVICE — DRAPE TOWEL BLUE 17" X 24"

## (undated) DEVICE — SUT VICRYL 1 36" CTX UNDYED

## (undated) DEVICE — BLADE SCALPEL SAFETYLOCK #11

## (undated) DEVICE — SUT POLYSORB 3-0 30" V-20 UNDYED

## (undated) DEVICE — DRAPE 3/4 SHEET W REINFORCEMENT 56X77"

## (undated) DEVICE — SUT STAINLESS STEEL 5 18" CCS

## (undated) DEVICE — TUBING SUCTION NON CONDUCTIVE 316X18" STERILE

## (undated) DEVICE — SUT VICRYL 0 36" CTX UNDYED

## (undated) DEVICE — DRSG OPSITE 2.5 X 2"

## (undated) DEVICE — SUT PROLENE 6-0 30" C-1

## (undated) DEVICE — CONNECTOR CARDIAC 1:1 FOR HUBLESS DRAINS

## (undated) DEVICE — DRSG OPSITE 13.75 X 4"

## (undated) DEVICE — CHEST DRAIN PLEUR-EVAC DRY/WET ADULT-PEDS SINGLE (QUICK)

## (undated) DEVICE — PACK UNIVERSAL CARDIAC

## (undated) DEVICE — SUT PROLENE 8-0 24" BV175-6

## (undated) DEVICE — PACK CUSTOM W/INSPIRE OXYGENATOR

## (undated) DEVICE — DRSG DERMABOND PRINEO 22CM

## (undated) DEVICE — GOWN TRIMAX LG

## (undated) DEVICE — DRSG KLING 6"

## (undated) DEVICE — SPECIMEN CONTAINER 100ML

## (undated) DEVICE — CHEST DRAIN PLEUR-EVAC WET/WET ADULT-PEDS SINGLE (QUICK)

## (undated) DEVICE — PACK CARDIAC YELLOW

## (undated) DEVICE — SYS VEIN HARVESTING VIRTUOSAPH PLUS W/ RADIAL

## (undated) DEVICE — DRAIN RESERVOIR FOR JACKSON PRATT 100CC CARDINAL

## (undated) DEVICE — MEDTRONIC URCHIN EVO HEART POSITIONER & CANISTER TUBING SET

## (undated) DEVICE — NDL COUNTER FOAM AND MAGNET 40-70

## (undated) DEVICE — SUT SILK 5-0 60" TIES

## (undated) DEVICE — TUBING KIT FAST START ATF 40

## (undated) DEVICE — SOL NORMOSOL-R PH7.4 1000ML

## (undated) DEVICE — SAW BLADE MICROAIRE STERNUM 1X34X9.4MM

## (undated) DEVICE — GLV 8.5 PROTEXIS (WHITE)

## (undated) DEVICE — FILTER REINFUSION FOR SALVAGED BLOOD DISP

## (undated) DEVICE — DRAPE MAYO STAND 30"

## (undated) DEVICE — GLV 7 PROTEXIS (WHITE)

## (undated) DEVICE — AORTIC PUNCH 4.0MM LONG LENGTH HANDLE

## (undated) DEVICE — GOWN TRIMAX XXL

## (undated) DEVICE — SUT DOUBLE 6 WIRE STERNAL

## (undated) DEVICE — DRSG ACE BANDAGE 6"

## (undated) DEVICE — VESSEL LOOP MAXI-RED  0.120" X 16"

## (undated) DEVICE — SUT PROLENE 7-0 24" BV175-6

## (undated) DEVICE — GOWN SLEEVES

## (undated) DEVICE — SAW BLADE SYNVASIVE OSCILLATING

## (undated) DEVICE — BULLDOG SPRING CLIP LATIS/LATIS 6MM 1/2 FORCE (BLUE)

## (undated) DEVICE — SUT PLEDGET SOFT LARGE 3/8" X 3/16" X 1/16" X6

## (undated) DEVICE — BLADE SCALPEL SAFETYLOCK #15

## (undated) DEVICE — CHEST DRAIN OASIS DRY SUCTION WATER SEAL

## (undated) DEVICE — SYR LUER LOK 20CC

## (undated) DEVICE — SOL IRR POUR H2O 250ML

## (undated) DEVICE — POSITIONER FOAM EGG CRATE ULNAR 2PCS (PINK)

## (undated) DEVICE — BEAVER BLADE MINI SHARP ALL ROUND (BLUE)

## (undated) DEVICE — POSITIONER CARDIAC BUMP

## (undated) DEVICE — BLADE SCALPEL SAFETYLOCK #10

## (undated) DEVICE — TOURNIQUET SET 12FR (1 RED, 1 BLUE, 1 SNARE) 7"

## (undated) DEVICE — STABILIZER HAND ASSISTANT ATTACHMENT W STABLESOFT 2S

## (undated) DEVICE — SYR LUER LOK 1CC

## (undated) DEVICE — SUT BOOT STANDARD (YELLOW) 5 PAIR

## (undated) DEVICE — SUCTION YANKAUER BULBOUS TIP NO VENT

## (undated) DEVICE — NDL HYPO SAFE 18G X 1.5" (PINK)

## (undated) DEVICE — SUT PROLENE 4-0 30" SH-1

## (undated) DEVICE — SYR LUER LOK 30CC

## (undated) DEVICE — DRAIN CHANNEL 32FR ROUND HUBLESS FULL FLUTED

## (undated) DEVICE — DRSG DERMABOND PRINEO 60CM

## (undated) DEVICE — TISSUE STABILIZER MEDTRONIC OCTOPUS EVOLUTION

## (undated) DEVICE — SUT SILK 2-0 18" SH (POP-OFF)